# Patient Record
Sex: FEMALE | Race: WHITE | Employment: OTHER | URBAN - METROPOLITAN AREA
[De-identification: names, ages, dates, MRNs, and addresses within clinical notes are randomized per-mention and may not be internally consistent; named-entity substitution may affect disease eponyms.]

---

## 2018-05-08 ENCOUNTER — OFFICE VISIT (OUTPATIENT)
Dept: FAMILY MEDICINE CLINIC | Age: 78
End: 2018-05-08
Payer: MEDICARE

## 2018-05-08 VITALS
TEMPERATURE: 98.1 F | WEIGHT: 210 LBS | DIASTOLIC BLOOD PRESSURE: 78 MMHG | BODY MASS INDEX: 39.65 KG/M2 | HEIGHT: 61 IN | OXYGEN SATURATION: 92 % | HEART RATE: 102 BPM | SYSTOLIC BLOOD PRESSURE: 136 MMHG

## 2018-05-08 DIAGNOSIS — I10 ESSENTIAL HYPERTENSION: Primary | ICD-10-CM

## 2018-05-08 DIAGNOSIS — E55.9 VITAMIN D DEFICIENCY: ICD-10-CM

## 2018-05-08 DIAGNOSIS — E78.2 MIXED HYPERLIPIDEMIA: ICD-10-CM

## 2018-05-08 DIAGNOSIS — Z12.31 SCREENING MAMMOGRAM, ENCOUNTER FOR: ICD-10-CM

## 2018-05-08 PROCEDURE — 99203 OFFICE O/P NEW LOW 30 MIN: CPT | Performed by: FAMILY MEDICINE

## 2018-05-08 PROCEDURE — G8417 CALC BMI ABV UP PARAM F/U: HCPCS | Performed by: FAMILY MEDICINE

## 2018-05-08 PROCEDURE — 1090F PRES/ABSN URINE INCON ASSESS: CPT | Performed by: FAMILY MEDICINE

## 2018-05-08 PROCEDURE — G8427 DOCREV CUR MEDS BY ELIG CLIN: HCPCS | Performed by: FAMILY MEDICINE

## 2018-05-08 PROCEDURE — 1123F ACP DISCUSS/DSCN MKR DOCD: CPT | Performed by: FAMILY MEDICINE

## 2018-05-08 PROCEDURE — 1036F TOBACCO NON-USER: CPT | Performed by: FAMILY MEDICINE

## 2018-05-08 PROCEDURE — 4040F PNEUMOC VAC/ADMIN/RCVD: CPT | Performed by: FAMILY MEDICINE

## 2018-05-08 PROCEDURE — G8400 PT W/DXA NO RESULTS DOC: HCPCS | Performed by: FAMILY MEDICINE

## 2018-05-08 RX ORDER — POLYETHYLENE GLYCOL 3350
POWDER (GRAM) MISCELLANEOUS
COMMUNITY
End: 2019-10-03

## 2018-05-08 RX ORDER — CYANOCOBALAMIN (VITAMIN B-12) 500 MCG
TABLET ORAL
COMMUNITY
End: 2018-09-06

## 2018-05-08 RX ORDER — BETA-CAROTENE 7500 MCG
25000 CAPSULE ORAL DAILY
COMMUNITY
End: 2018-09-06

## 2018-05-08 ASSESSMENT — PATIENT HEALTH QUESTIONNAIRE - PHQ9
1. LITTLE INTEREST OR PLEASURE IN DOING THINGS: 0
SUM OF ALL RESPONSES TO PHQ QUESTIONS 1-9: 0
SUM OF ALL RESPONSES TO PHQ9 QUESTIONS 1 & 2: 0
2. FEELING DOWN, DEPRESSED OR HOPELESS: 0

## 2018-06-20 ENCOUNTER — HOSPITAL ENCOUNTER (OUTPATIENT)
Dept: MAMMOGRAPHY | Age: 78
Discharge: HOME | End: 2018-06-22
Payer: MEDICARE

## 2018-06-20 DIAGNOSIS — Z12.31 SCREENING MAMMOGRAM, ENCOUNTER FOR: ICD-10-CM

## 2018-06-20 PROCEDURE — 77067 SCR MAMMO BI INCL CAD: CPT

## 2018-08-31 ENCOUNTER — HOSPITAL ENCOUNTER (OUTPATIENT)
Age: 78
Discharge: HOME | End: 2018-09-02
Payer: MEDICARE

## 2018-08-31 ENCOUNTER — NURSE ONLY (OUTPATIENT)
Dept: FAMILY MEDICINE CLINIC | Age: 78
End: 2018-08-31
Payer: MEDICARE

## 2018-08-31 DIAGNOSIS — I10 ESSENTIAL HYPERTENSION: ICD-10-CM

## 2018-08-31 DIAGNOSIS — E55.9 VITAMIN D DEFICIENCY: ICD-10-CM

## 2018-08-31 DIAGNOSIS — E78.2 MIXED HYPERLIPIDEMIA: ICD-10-CM

## 2018-08-31 DIAGNOSIS — E78.2 MIXED HYPERLIPIDEMIA: Primary | ICD-10-CM

## 2018-08-31 LAB
ALBUMIN SERPL-MCNC: 3.9 G/DL (ref 3.5–5.2)
ALP BLD-CCNC: 72 U/L (ref 35–104)
ALT SERPL-CCNC: 10 U/L (ref 0–32)
ANION GAP SERPL CALCULATED.3IONS-SCNC: 13 MMOL/L (ref 7–16)
AST SERPL-CCNC: 14 U/L (ref 0–31)
BILIRUB SERPL-MCNC: 0.6 MG/DL (ref 0–1.2)
BILIRUBIN DIRECT: <0.2 MG/DL (ref 0–0.3)
BILIRUBIN, INDIRECT: NORMAL MG/DL (ref 0–1)
BUN BLDV-MCNC: 17 MG/DL (ref 8–23)
CALCIUM SERPL-MCNC: 9.5 MG/DL (ref 8.6–10.2)
CHLORIDE BLD-SCNC: 98 MMOL/L (ref 98–107)
CHOLESTEROL, TOTAL: 151 MG/DL (ref 0–199)
CO2: 28 MMOL/L (ref 22–29)
CREAT SERPL-MCNC: 0.8 MG/DL (ref 0.5–1)
GFR AFRICAN AMERICAN: >60
GFR NON-AFRICAN AMERICAN: >60 ML/MIN/1.73
GLUCOSE BLD-MCNC: 81 MG/DL (ref 74–109)
HDLC SERPL-MCNC: 40 MG/DL
LDL CHOLESTEROL CALCULATED: 96 MG/DL (ref 0–99)
POTASSIUM SERPL-SCNC: 5.6 MMOL/L (ref 3.5–5)
SODIUM BLD-SCNC: 139 MMOL/L (ref 132–146)
TOTAL PROTEIN: 7.4 G/DL (ref 6.4–8.3)
TRIGL SERPL-MCNC: 74 MG/DL (ref 0–149)
VITAMIN D 25-HYDROXY: 46 NG/ML (ref 30–100)
VLDLC SERPL CALC-MCNC: 15 MG/DL

## 2018-08-31 PROCEDURE — 80076 HEPATIC FUNCTION PANEL: CPT

## 2018-08-31 PROCEDURE — 36415 COLL VENOUS BLD VENIPUNCTURE: CPT | Performed by: FAMILY MEDICINE

## 2018-08-31 PROCEDURE — 82306 VITAMIN D 25 HYDROXY: CPT

## 2018-08-31 PROCEDURE — 80061 LIPID PANEL: CPT

## 2018-08-31 PROCEDURE — 80048 BASIC METABOLIC PNL TOTAL CA: CPT

## 2018-09-02 DIAGNOSIS — E87.5 HYPERKALEMIA: Primary | ICD-10-CM

## 2018-09-06 ENCOUNTER — OFFICE VISIT (OUTPATIENT)
Dept: FAMILY MEDICINE CLINIC | Age: 78
End: 2018-09-06
Payer: MEDICARE

## 2018-09-06 VITALS
BODY MASS INDEX: 38.14 KG/M2 | HEART RATE: 102 BPM | WEIGHT: 202 LBS | OXYGEN SATURATION: 96 % | SYSTOLIC BLOOD PRESSURE: 130 MMHG | DIASTOLIC BLOOD PRESSURE: 84 MMHG | HEIGHT: 61 IN | TEMPERATURE: 97.7 F

## 2018-09-06 DIAGNOSIS — I10 ESSENTIAL HYPERTENSION: Primary | ICD-10-CM

## 2018-09-06 DIAGNOSIS — E78.2 MIXED HYPERLIPIDEMIA: ICD-10-CM

## 2018-09-06 DIAGNOSIS — D22.9 NEVUS: ICD-10-CM

## 2018-09-06 PROCEDURE — 1090F PRES/ABSN URINE INCON ASSESS: CPT | Performed by: FAMILY MEDICINE

## 2018-09-06 PROCEDURE — 1101F PT FALLS ASSESS-DOCD LE1/YR: CPT | Performed by: FAMILY MEDICINE

## 2018-09-06 PROCEDURE — G8427 DOCREV CUR MEDS BY ELIG CLIN: HCPCS | Performed by: FAMILY MEDICINE

## 2018-09-06 PROCEDURE — 1036F TOBACCO NON-USER: CPT | Performed by: FAMILY MEDICINE

## 2018-09-06 PROCEDURE — 4040F PNEUMOC VAC/ADMIN/RCVD: CPT | Performed by: FAMILY MEDICINE

## 2018-09-06 PROCEDURE — G8417 CALC BMI ABV UP PARAM F/U: HCPCS | Performed by: FAMILY MEDICINE

## 2018-09-06 PROCEDURE — G8400 PT W/DXA NO RESULTS DOC: HCPCS | Performed by: FAMILY MEDICINE

## 2018-09-06 PROCEDURE — 99213 OFFICE O/P EST LOW 20 MIN: CPT | Performed by: FAMILY MEDICINE

## 2018-09-06 PROCEDURE — 1123F ACP DISCUSS/DSCN MKR DOCD: CPT | Performed by: FAMILY MEDICINE

## 2018-09-06 RX ORDER — ROSUVASTATIN CALCIUM 5 MG/1
5 TABLET, COATED ORAL DAILY
Qty: 90 TABLET | Refills: 3 | Status: SHIPPED | OUTPATIENT
Start: 2018-09-06 | End: 2019-10-03 | Stop reason: SDUPTHER

## 2018-09-06 RX ORDER — CARVEDILOL 3.12 MG/1
3.12 TABLET ORAL 2 TIMES DAILY WITH MEALS
Qty: 180 TABLET | Refills: 3 | Status: SHIPPED | OUTPATIENT
Start: 2018-09-06 | End: 2019-10-03 | Stop reason: SDUPTHER

## 2018-09-06 NOTE — PROGRESS NOTES
History and Physical    Patient:   69 y.o. female MRN: [id number]     Date of Service: 9/6/2018      Chief complaint:    History of Present Illness   The patient is a 69 y.o. female    Chief Complaint   Patient presents with    Hypertension      CC: htn, hyperlipidemia,   has a nevus she would like removed    HPI:  HTN-blood pressure has been controlled   Hyperlipidemia- on rosuvastatin   Has been missing some of her med doses-she has been caring for her daughter who is ill from renal cancer. Past Medical History:      Diagnosis Date    Hyperlipidemia     Hypertension     On aspirin at home     pt has history of htn       Past Surgical History:        Procedure Laterality Date    BREAST SURGERY Right     lumpectomy    CHOLECYSTECTOMY      COLONOSCOPY  02/20/2017    TUBAL LIGATION         Allergies:  Patient has no known allergies. Social History:   TOBACCO:   reports that she has never smoked. She has never used smokeless tobacco.  ETOH:   reports that she does not drink alcohol.       Family History:   Family History   Problem Relation Age of Onset    Heart Attack Father        REVIEW OF SYSTEMS:     Review of Systems - General ROS: negative for - chills or fever  Respiratory ROS: no  shortness of breath, or wheezing  Chronic cough for 20 years   Cardiovascular ROS: no chest pain or dyspnea on exertion  Gastrointestinal ROS: no abdominal pain, change in bowel habits, or black or bloody stools  Genito-Urinary ROS: no dysuria, trouble voiding, or hematuria  Dermatological ROS: + moles right neck and left abdomen       Physical Exam   /84 (Site: Left Arm, Position: Sitting, Cuff Size: Large Adult)   Pulse 102   Temp 97.7 °F (36.5 °C) (Oral)   Ht 5' 1\" (1.549 m)   Wt 202 lb (91.6 kg)   SpO2 96%   BMI 38.17 kg/m²   Physical Examination: General appearance - alert, well appearing, and in no distress  Ears - bilateral TM's and external ear canals were not injected  Nose - no drainage, no

## 2018-09-07 ENCOUNTER — HOSPITAL ENCOUNTER (OUTPATIENT)
Age: 78
Discharge: HOME | End: 2018-09-07
Payer: MEDICARE

## 2018-09-07 DIAGNOSIS — E87.5 HYPERKALEMIA: ICD-10-CM

## 2018-09-07 LAB
ANION GAP SERPL CALCULATED.3IONS-SCNC: 8 MMOL/L (ref 7–16)
BUN BLDV-MCNC: 16 MG/DL (ref 8–23)
CALCIUM SERPL-MCNC: 9.5 MG/DL (ref 8.6–10.2)
CHLORIDE BLD-SCNC: 101 MMOL/L (ref 98–107)
CO2: 32 MMOL/L (ref 22–29)
CREAT SERPL-MCNC: 0.8 MG/DL (ref 0.5–1)
GFR AFRICAN AMERICAN: >60
GFR NON-AFRICAN AMERICAN: >60 ML/MIN/1.73
GLUCOSE BLD-MCNC: 94 MG/DL (ref 74–109)
POTASSIUM SERPL-SCNC: 5.2 MMOL/L (ref 3.5–5)
SODIUM BLD-SCNC: 141 MMOL/L (ref 132–146)

## 2018-09-07 PROCEDURE — 36415 COLL VENOUS BLD VENIPUNCTURE: CPT

## 2018-09-07 PROCEDURE — 80048 BASIC METABOLIC PNL TOTAL CA: CPT

## 2018-09-14 ENCOUNTER — TELEPHONE (OUTPATIENT)
Dept: FAMILY MEDICINE CLINIC | Age: 78
End: 2018-09-14

## 2018-09-14 NOTE — TELEPHONE ENCOUNTER
Patient has corn in between toes on left baby toe, that is hurting her and causing discomfort. Would like a referral to a foot doctor. Please advise.

## 2019-02-26 ENCOUNTER — TELEPHONE (OUTPATIENT)
Dept: FAMILY MEDICINE CLINIC | Age: 79
End: 2019-02-26

## 2019-02-26 DIAGNOSIS — E78.2 MIXED HYPERLIPIDEMIA: ICD-10-CM

## 2019-02-26 DIAGNOSIS — I10 ESSENTIAL HYPERTENSION: Primary | ICD-10-CM

## 2019-02-27 ENCOUNTER — NURSE ONLY (OUTPATIENT)
Dept: FAMILY MEDICINE CLINIC | Age: 79
End: 2019-02-27
Payer: MEDICARE

## 2019-02-27 ENCOUNTER — HOSPITAL ENCOUNTER (OUTPATIENT)
Age: 79
Discharge: HOME | End: 2019-03-01
Payer: MEDICARE

## 2019-02-27 DIAGNOSIS — E78.2 MIXED HYPERLIPIDEMIA: ICD-10-CM

## 2019-02-27 DIAGNOSIS — I10 ESSENTIAL HYPERTENSION: ICD-10-CM

## 2019-02-27 DIAGNOSIS — I10 ESSENTIAL HYPERTENSION: Primary | ICD-10-CM

## 2019-02-27 LAB
ALBUMIN SERPL-MCNC: 3.9 G/DL (ref 3.5–5.2)
ALP BLD-CCNC: 64 U/L (ref 35–104)
ALT SERPL-CCNC: 11 U/L (ref 0–32)
ANION GAP SERPL CALCULATED.3IONS-SCNC: 13 MMOL/L (ref 7–16)
AST SERPL-CCNC: 18 U/L (ref 0–31)
BILIRUB SERPL-MCNC: 0.4 MG/DL (ref 0–1.2)
BILIRUBIN DIRECT: <0.2 MG/DL (ref 0–0.3)
BILIRUBIN, INDIRECT: NORMAL MG/DL (ref 0–1)
BUN BLDV-MCNC: 12 MG/DL (ref 8–23)
CALCIUM SERPL-MCNC: 9.5 MG/DL (ref 8.6–10.2)
CHLORIDE BLD-SCNC: 101 MMOL/L (ref 98–107)
CHOLESTEROL, TOTAL: 158 MG/DL (ref 0–199)
CO2: 29 MMOL/L (ref 22–29)
CREAT SERPL-MCNC: 0.8 MG/DL (ref 0.5–1)
GFR AFRICAN AMERICAN: >60
GFR NON-AFRICAN AMERICAN: >60 ML/MIN/1.73
GLUCOSE BLD-MCNC: 87 MG/DL (ref 74–99)
HDLC SERPL-MCNC: 39 MG/DL
LDL CHOLESTEROL CALCULATED: 92 MG/DL (ref 0–99)
POTASSIUM SERPL-SCNC: 5.6 MMOL/L (ref 3.5–5)
SODIUM BLD-SCNC: 143 MMOL/L (ref 132–146)
TOTAL PROTEIN: 7 G/DL (ref 6.4–8.3)
TRIGL SERPL-MCNC: 134 MG/DL (ref 0–149)
VLDLC SERPL CALC-MCNC: 27 MG/DL

## 2019-02-27 PROCEDURE — 80061 LIPID PANEL: CPT

## 2019-02-27 PROCEDURE — 80048 BASIC METABOLIC PNL TOTAL CA: CPT

## 2019-02-27 PROCEDURE — 80076 HEPATIC FUNCTION PANEL: CPT

## 2019-02-27 PROCEDURE — 36415 COLL VENOUS BLD VENIPUNCTURE: CPT | Performed by: FAMILY MEDICINE

## 2019-02-28 DIAGNOSIS — E87.5 HYPERKALEMIA: Primary | ICD-10-CM

## 2019-03-04 ENCOUNTER — HOSPITAL ENCOUNTER (OUTPATIENT)
Age: 79
Discharge: HOME | End: 2019-03-04
Payer: MEDICARE

## 2019-03-04 DIAGNOSIS — E87.5 HYPERKALEMIA: ICD-10-CM

## 2019-03-04 LAB — POTASSIUM SERPL-SCNC: 4.5 MMOL/L (ref 3.5–5)

## 2019-03-04 PROCEDURE — 36415 COLL VENOUS BLD VENIPUNCTURE: CPT

## 2019-03-04 PROCEDURE — 84132 ASSAY OF SERUM POTASSIUM: CPT

## 2019-03-06 ENCOUNTER — OFFICE VISIT (OUTPATIENT)
Dept: FAMILY MEDICINE CLINIC | Age: 79
End: 2019-03-06
Payer: MEDICARE

## 2019-03-06 VITALS
BODY MASS INDEX: 39.65 KG/M2 | OXYGEN SATURATION: 92 % | WEIGHT: 210 LBS | DIASTOLIC BLOOD PRESSURE: 70 MMHG | HEART RATE: 89 BPM | TEMPERATURE: 98.1 F | SYSTOLIC BLOOD PRESSURE: 120 MMHG | HEIGHT: 61 IN

## 2019-03-06 DIAGNOSIS — E78.2 MIXED HYPERLIPIDEMIA: ICD-10-CM

## 2019-03-06 DIAGNOSIS — I10 ESSENTIAL HYPERTENSION: Primary | ICD-10-CM

## 2019-03-06 PROCEDURE — 1101F PT FALLS ASSESS-DOCD LE1/YR: CPT | Performed by: FAMILY MEDICINE

## 2019-03-06 PROCEDURE — 99212 OFFICE O/P EST SF 10 MIN: CPT | Performed by: FAMILY MEDICINE

## 2019-03-06 PROCEDURE — G8417 CALC BMI ABV UP PARAM F/U: HCPCS | Performed by: FAMILY MEDICINE

## 2019-03-06 PROCEDURE — G8427 DOCREV CUR MEDS BY ELIG CLIN: HCPCS | Performed by: FAMILY MEDICINE

## 2019-03-06 PROCEDURE — 1090F PRES/ABSN URINE INCON ASSESS: CPT | Performed by: FAMILY MEDICINE

## 2019-03-06 PROCEDURE — 1036F TOBACCO NON-USER: CPT | Performed by: FAMILY MEDICINE

## 2019-03-06 PROCEDURE — 1123F ACP DISCUSS/DSCN MKR DOCD: CPT | Performed by: FAMILY MEDICINE

## 2019-03-06 PROCEDURE — G8484 FLU IMMUNIZE NO ADMIN: HCPCS | Performed by: FAMILY MEDICINE

## 2019-03-06 PROCEDURE — G8400 PT W/DXA NO RESULTS DOC: HCPCS | Performed by: FAMILY MEDICINE

## 2019-03-06 PROCEDURE — 4040F PNEUMOC VAC/ADMIN/RCVD: CPT | Performed by: FAMILY MEDICINE

## 2019-03-06 ASSESSMENT — PATIENT HEALTH QUESTIONNAIRE - PHQ9
2. FEELING DOWN, DEPRESSED OR HOPELESS: 0
1. LITTLE INTEREST OR PLEASURE IN DOING THINGS: 0
SUM OF ALL RESPONSES TO PHQ9 QUESTIONS 1 & 2: 0
SUM OF ALL RESPONSES TO PHQ QUESTIONS 1-9: 0
SUM OF ALL RESPONSES TO PHQ QUESTIONS 1-9: 0

## 2019-04-18 ENCOUNTER — HOSPITAL ENCOUNTER (EMERGENCY)
Age: 79
Discharge: HOME | End: 2019-04-18
Attending: EMERGENCY MEDICINE
Payer: MEDICARE

## 2019-04-18 VITALS
HEART RATE: 87 BPM | BODY MASS INDEX: 39.65 KG/M2 | RESPIRATION RATE: 95 BRPM | HEIGHT: 61 IN | SYSTOLIC BLOOD PRESSURE: 144 MMHG | DIASTOLIC BLOOD PRESSURE: 88 MMHG | TEMPERATURE: 97.7 F | OXYGEN SATURATION: 91 % | WEIGHT: 210 LBS

## 2019-04-18 DIAGNOSIS — R04.0 EPISTAXIS: Primary | ICD-10-CM

## 2019-04-18 PROCEDURE — 99282 EMERGENCY DEPT VISIT SF MDM: CPT

## 2019-04-18 PROCEDURE — 30901 CONTROL OF NOSEBLEED: CPT

## 2019-04-18 RX ORDER — CEFDINIR 300 MG/1
300 CAPSULE ORAL DAILY
Qty: 7 CAPSULE | Refills: 0 | Status: SHIPPED | OUTPATIENT
Start: 2019-04-18 | End: 2019-04-25

## 2019-04-19 NOTE — ED PROVIDER NOTES
Department of Emergency Medicine   ED  Provider Note  Admit Date/RoomTime: 4/18/2019 10:43 PM  ED Room: 21/21      History of Present Illness:  4/18/19, Time: 11:24 PM         Greg Carolina is a 67 y.o. female presenting to the ED for epistaxis, beginning a few days ago. The complaint has been intermittent, moderate in severity, and worsened by nothing. Pt reports that she has been experiencing intermittent bleeding from her left nare for the past few days and today she had \"a lot come down\". Denies any blood thinner but does take ASA. Denies any trauma or injury. Their is no bleeding now. Pt denies any loc, chest pain, sob, palpitations, fever, chills, nausea, vomiting, diarrhea, abdominal pain, neck pain, extremity pain, edema, HA, congestion, visual disturbances, dysuria, constipation, hematuria, weakness, numbness, tingling, dizziness or any other further symptoms at this time. Review of Systems:   Pertinent positives and negatives are stated within HPI, all other systems reviewed and are negative.    --------------------------------------------- PAST HISTORY ------------------------------------------  Past Medical History:  has a past medical history of Hyperlipidemia, Hypertension, and On aspirin at home. Past Surgical History:  has a past surgical history that includes Cholecystectomy; Breast surgery (Right); Tubal ligation; and Colonoscopy (02/20/2017). Social History:  reports that she has never smoked. She has never used smokeless tobacco. She reports that she does not drink alcohol or use drugs. Family History: family history includes Heart Attack in her father. The patients home medications have been reviewed. Allergies: Patient has no known allergies.     ----------------------------------------------PHYSICAL EXAM--------------------------------------  Constitutional/General: Alert and oriented x3, well appearing, non toxic in NAD  Head: Normocephalic and atraumatic  Eyes: PERRL, EOMI, conjunctiva normal, sclera non icteric  Mouth: Oropharynx clear, handling secretions, no trismus   Nose: Dry blood noted to left nare   Respiratory: Lungs clear to auscultation bilaterally, no wheezes, rales, or rhonchi. Not in respiratory distress  Cardiovascular:  Regular rate. Regular rhythm. No murmurs, gallops, or rubs. 2+ distal pulses  GI:  Abdomen Soft, Non tender, Non distended. +BS. No rebound, guarding, or rigidity. Musculoskeletal: Moves all extremities x 4. Warm and well perfused, no clubbing, cyanosis, or edema. Integument: Skin warm   Neurologic: GCS 15,  Psychiatric: Normal Affect    -------------------------------------------------- RESULTS -------------------------------------------------  I have personally reviewed all laboratory and imaging results for this patient. Results are listed below. LABS:  No results found for this visit on 04/18/19. RADIOLOGY:  Interpreted by Radiologist.  No orders to display       ------------------------- NURSING NOTES AND VITALS REVIEWED ---------------------------   The nursing notes within the ED encounter and vital signs as below have been reviewed by myself. BP (!) 144/88   Pulse 109   Temp 97.7 °F (36.5 °C) (Oral)   Resp 16   Ht 5' 1\" (1.549 m)   Wt 210 lb (95.3 kg)   SpO2 91%   BMI 39.68 kg/m²   Oxygen Saturation Interpretation: Normal    The patients available past medical records and past encounters were reviewed. ------------------------- ED COURSE/MEDICAL DECISION MAKING----------------------  Medications - No data to display    Procedures:   PROCEDURE NOTE  4/18/19       Time: 11:20 PM    NASAL PACKING / CAUTERY  Risks, benefits and alternatives (for applicable procedures below) described. Performed By: Valerie Lema MD    Indication:   Left anterior epistaxis. Informed consent obtained: by patient or legal gardian. Procedure:  Left nares tamponaded with an anterior rapid Rhino nasal pack . Ronni Garrett   Hemostatsis obtained. Patient tolerated the procedure well. ENT Consultation:  No.           Medical Decision Making:    Pt presents for epistaxis. Nasal packing done in the ED. Pt advised to follow up with ENT as an outpatient. Patient was explicitly instructed on specific signs and symptoms on which to return to the emergency room for. Patient was instructed to return to the ER for any new or worsening symptoms. Additional discharge instructions were given verbally. All questions were answered. Patient is comfortable and agreeable with discharge plan. Patient in no acute distress and non-toxic in appearance. This patient's ED course included: a personal history and physicial examination    This patient has remained hemodynamically stable during their ED course. Re-Evaluations:           Re-evaluation. Patients symptoms are improving           Counseling: The emergency provider has spoken with the patient and discussed todays results, in addition to providing specific details for the plan of care and counseling regarding the diagnosis and prognosis. Questions are answered at this time and they are agreeable with the plan.     ---------------------------- IMPRESSION AND DISPOSITION ---------------------------------    IMPRESSION  1. Epistaxis Stable       DISPOSITION  Disposition: Discharge to home  Patient condition is stable    SCRIBE ATTESTATION  4/18/19, 11:24 PM.    This note is prepared by [facility], acting as Scribe for Marjorie Luciano MD.    Marjorie Luciano MD: The scribe's documentation has been prepared under my direction and personally reviewed by me in its entirety. I confirm that the note above accurately reflects all work, treatment, procedures, and medical decision making performed by me. NOTE: This report was transcribed using voice recognition software. Every effort was made to ensure accuracy; however, inadvertent computerized transcription errors may be present.         Rayna Miller Yadiel Cast MD  04/19/19 [id number]

## 2019-06-14 ENCOUNTER — OFFICE VISIT (OUTPATIENT)
Dept: FAMILY MEDICINE CLINIC | Age: 79
End: 2019-06-14
Payer: MEDICARE

## 2019-06-14 VITALS
HEIGHT: 61 IN | SYSTOLIC BLOOD PRESSURE: 136 MMHG | DIASTOLIC BLOOD PRESSURE: 80 MMHG | HEART RATE: 110 BPM | TEMPERATURE: 98.3 F | WEIGHT: 207.5 LBS | BODY MASS INDEX: 39.18 KG/M2 | OXYGEN SATURATION: 93 %

## 2019-06-14 DIAGNOSIS — R05.9 COUGH: ICD-10-CM

## 2019-06-14 DIAGNOSIS — J18.9 PNEUMONIA DUE TO INFECTIOUS ORGANISM, UNSPECIFIED LATERALITY, UNSPECIFIED PART OF LUNG: ICD-10-CM

## 2019-06-14 DIAGNOSIS — R00.2 PALPITATION: Primary | ICD-10-CM

## 2019-06-14 PROCEDURE — 99213 OFFICE O/P EST LOW 20 MIN: CPT | Performed by: FAMILY MEDICINE

## 2019-06-14 PROCEDURE — 93000 ELECTROCARDIOGRAM COMPLETE: CPT | Performed by: FAMILY MEDICINE

## 2019-06-14 RX ORDER — CEFUROXIME AXETIL 500 MG/1
500 TABLET ORAL 2 TIMES DAILY
Qty: 20 TABLET | Refills: 0 | Status: SHIPPED | OUTPATIENT
Start: 2019-06-14 | End: 2019-06-24

## 2019-06-14 ASSESSMENT — ENCOUNTER SYMPTOMS: COUGH: 1

## 2019-06-24 ENCOUNTER — HOSPITAL ENCOUNTER (OUTPATIENT)
Age: 79
Discharge: HOME | End: 2019-06-26
Payer: MEDICARE

## 2019-06-24 ENCOUNTER — HOSPITAL ENCOUNTER (OUTPATIENT)
Dept: MAMMOGRAPHY | Age: 79
Discharge: HOME | End: 2019-06-26
Payer: MEDICARE

## 2019-06-24 ENCOUNTER — OFFICE VISIT (OUTPATIENT)
Dept: FAMILY MEDICINE CLINIC | Age: 79
End: 2019-06-24
Payer: MEDICARE

## 2019-06-24 VITALS
OXYGEN SATURATION: 95 % | DIASTOLIC BLOOD PRESSURE: 80 MMHG | BODY MASS INDEX: 39.11 KG/M2 | HEART RATE: 78 BPM | SYSTOLIC BLOOD PRESSURE: 126 MMHG | RESPIRATION RATE: 18 BRPM | TEMPERATURE: 98.2 F | WEIGHT: 207 LBS

## 2019-06-24 DIAGNOSIS — R53.83 FATIGUE, UNSPECIFIED TYPE: ICD-10-CM

## 2019-06-24 DIAGNOSIS — R53.83 FATIGUE, UNSPECIFIED TYPE: Primary | ICD-10-CM

## 2019-06-24 DIAGNOSIS — Z12.39 BREAST CANCER SCREENING: ICD-10-CM

## 2019-06-24 LAB
ALBUMIN SERPL-MCNC: 4 G/DL (ref 3.5–5.2)
ALP BLD-CCNC: 71 U/L (ref 35–104)
ALT SERPL-CCNC: 11 U/L (ref 0–32)
ANION GAP SERPL CALCULATED.3IONS-SCNC: 13 MMOL/L (ref 7–16)
AST SERPL-CCNC: 15 U/L (ref 0–31)
BILIRUB SERPL-MCNC: 0.2 MG/DL (ref 0–1.2)
BILIRUBIN, POC: NORMAL
BLOOD URINE, POC: NORMAL
BUN BLDV-MCNC: 11 MG/DL (ref 8–23)
CALCIUM SERPL-MCNC: 9.7 MG/DL (ref 8.6–10.2)
CHLORIDE BLD-SCNC: 99 MMOL/L (ref 98–107)
CLARITY, POC: CLEAR
CO2: 27 MMOL/L (ref 22–29)
COLOR, POC: YELLOW
CREAT SERPL-MCNC: 0.6 MG/DL (ref 0.5–1)
GFR AFRICAN AMERICAN: >60
GFR NON-AFRICAN AMERICAN: >60 ML/MIN/1.73
GLUCOSE BLD-MCNC: 89 MG/DL (ref 74–99)
GLUCOSE URINE, POC: NORMAL
HCT VFR BLD CALC: 48.5 % (ref 34–48)
HEMOGLOBIN: 15.2 G/DL (ref 11.5–15.5)
KETONES, POC: NORMAL
LEUKOCYTE EST, POC: NORMAL
MAGNESIUM: 2.3 MG/DL (ref 1.6–2.6)
MCH RBC QN AUTO: 31 PG (ref 26–35)
MCHC RBC AUTO-ENTMCNC: 31.3 % (ref 32–34.5)
MCV RBC AUTO: 99 FL (ref 80–99.9)
NITRITE, POC: NORMAL
PDW BLD-RTO: 12.8 FL (ref 11.5–15)
PH, POC: 5.5
PLATELET # BLD: 306 E9/L (ref 130–450)
PMV BLD AUTO: 11 FL (ref 7–12)
POTASSIUM SERPL-SCNC: 5 MMOL/L (ref 3.5–5)
PROTEIN, POC: NORMAL
RBC # BLD: 4.9 E12/L (ref 3.5–5.5)
SODIUM BLD-SCNC: 139 MMOL/L (ref 132–146)
SPECIFIC GRAVITY, POC: 1.25
TOTAL PROTEIN: 7.2 G/DL (ref 6.4–8.3)
TSH SERPL DL<=0.05 MIU/L-ACNC: 1.81 UIU/ML (ref 0.27–4.2)
UROBILINOGEN, POC: 0.2
WBC # BLD: 12 E9/L (ref 4.5–11.5)

## 2019-06-24 PROCEDURE — 80053 COMPREHEN METABOLIC PANEL: CPT

## 2019-06-24 PROCEDURE — 77063 BREAST TOMOSYNTHESIS BI: CPT

## 2019-06-24 PROCEDURE — 84443 ASSAY THYROID STIM HORMONE: CPT

## 2019-06-24 PROCEDURE — 99214 OFFICE O/P EST MOD 30 MIN: CPT | Performed by: PHYSICIAN ASSISTANT

## 2019-06-24 PROCEDURE — 83735 ASSAY OF MAGNESIUM: CPT

## 2019-06-24 PROCEDURE — 85027 COMPLETE CBC AUTOMATED: CPT

## 2019-06-24 PROCEDURE — 36415 COLL VENOUS BLD VENIPUNCTURE: CPT

## 2019-06-24 PROCEDURE — 81002 URINALYSIS NONAUTO W/O SCOPE: CPT | Performed by: PHYSICIAN ASSISTANT

## 2019-06-24 NOTE — PROGRESS NOTES
COLONOSCOPY  02/20/2017    TUBAL LIGATION       Medications:     Current Outpatient Medications:     cefUROXime (CEFTIN) 500 MG tablet, Take 1 tablet by mouth 2 times daily for 10 days, Disp: 20 tablet, Rfl: 0    rosuvastatin (CRESTOR) 5 MG tablet, Take 1 tablet by mouth daily, Disp: 90 tablet, Rfl: 3    carvedilol (COREG) 3.125 MG tablet, Take 1 tablet by mouth 2 times daily (with meals) Instructed to take am of procedure, Disp: 180 tablet, Rfl: 3    Polyethylene Glycol 3350 POWD, by Does not apply route Mix one capful in 8 ounces of noncarbonated liquid and drink once daily, Disp: , Rfl:     Allergies:   No Known Allergies    Social History:     Social History     Tobacco Use    Smoking status: Never Smoker    Smokeless tobacco: Never Used   Substance Use Topics    Alcohol use: No    Drug use: No       Physical Exam:     Vitals:    06/24/19 1338   BP: 126/80   Pulse: 78   Resp: 18   Temp: 98.2 °F (36.8 °C)   TempSrc: Temporal   SpO2: 95%   Weight: 207 lb (93.9 kg)       Exam:  Physical Exam  Nurse's notes and vital signs reviewed. The patient is not hypoxic. General: Alert, no acute distress, patient resting comfortably Patient is not toxic or lethargic. Skin: Warm, intact, no pallor noted. There is no evidence of rash at this time. Head: Normocephalic, atraumatic. Eye: Normal conjunctiva  Ears, Nose, Throat: Right tympanic membrane clear, left tympanic membrane clear. No drainage or discharge noted. No pre- or post-auricular tenderness, erythema, or swelling noted. No significant nasal congestion or rhinorrhea. No facial erythema. Posterior oropharynx shows no erythema and no hypertrophy, asymmetry or peritonsillar abscess and no evidence of exudate. the uvula is midline. No trismus or drooling is noted. Moist mucous membranes. Neck: No anterior/posterior lymphadenopathy noted. No erythema, no masses, no fluctuance or induration noted. No meningeal signs.   Cardio: Regular Rate and repeat evaluation repeat assessment. Clinical Impression:   Disha Saldana was seen today for fatigue. Diagnoses and all orders for this visit:    Fatigue, unspecified type  -     CBC; Future  -     COMPREHENSIVE METABOLIC PANEL; Future  -     MAGNESIUM; Future  -     TSH; Future  -     POCT Urinalysis no Micro        The patient is to call for any concerns or return if any of the signs or symptoms worsen. The patient is to follow-up with PCP in the next 2-3 days for repeat evaluation repeat assessment or go directly to the emergency department.      SIGNATURE: Lesvia Smith III, PA-C

## 2019-06-26 ASSESSMENT — ENCOUNTER SYMPTOMS
ABDOMINAL PAIN: 0
BLOOD IN STOOL: 0
TROUBLE SWALLOWING: 0
SHORTNESS OF BREATH: 0
CHEST TIGHTNESS: 0

## 2019-06-26 NOTE — PROGRESS NOTES
19  Media Pop : 1940 Sex: female  Age: 67 y.o. Chief Complaint   Patient presents with    Fatigue       Pt was in express on  with sx of fatigue for 2 weeks. Urine and chest xray was normal. Pt had labs completed which were also normal --having episodes of weakness. Was on ab's for her cough      Review of Systems   Constitutional: Negative for activity change, appetite change, fatigue and unexpected weight change. HENT: Negative for congestion, dental problem and trouble swallowing. Eyes: Negative for visual disturbance. Respiratory: Negative for chest tightness and shortness of breath. Cardiovascular: Negative for chest pain, palpitations and leg swelling. Gastrointestinal: Negative for abdominal pain and blood in stool. Endocrine: Negative for cold intolerance, heat intolerance, polydipsia and polyuria. Genitourinary: Negative for difficulty urinating, hematuria and menstrual problem. Musculoskeletal: Negative for arthralgias, gait problem, joint swelling and myalgias. Allergic/Immunologic: Negative for environmental allergies and food allergies. Neurological: Negative for syncope, weakness, light-headedness and numbness. Hematological: Negative for adenopathy. Psychiatric/Behavioral: The patient is not nervous/anxious.          NOT DEPRESSED         Current Outpatient Medications:     rosuvastatin (CRESTOR) 5 MG tablet, Take 1 tablet by mouth daily, Disp: 90 tablet, Rfl: 3    carvedilol (COREG) 3.125 MG tablet, Take 1 tablet by mouth 2 times daily (with meals) Instructed to take am of procedure, Disp: 180 tablet, Rfl: 3    Polyethylene Glycol 3350 POWD, by Does not apply route Mix one capful in 8 ounces of noncarbonated liquid and drink once daily, Disp: , Rfl:   No Known Allergies    Past Medical History:   Diagnosis Date    Hyperlipidemia     Hypertension     On aspirin at home     pt has history of htn     Past Surgical History:   Procedure Laterality Date    BREAST SURGERY Right     lumpectomy    CHOLECYSTECTOMY      COLONOSCOPY  02/20/2017    TUBAL LIGATION       Family History   Problem Relation Age of Onset    Heart Attack Father      Social History     Socioeconomic History    Marital status:      Spouse name: Not on file    Number of children: Not on file    Years of education: Not on file    Highest education level: Not on file   Occupational History    Not on file   Social Needs    Financial resource strain: Not on file    Food insecurity:     Worry: Not on file     Inability: Not on file    Transportation needs:     Medical: Not on file     Non-medical: Not on file   Tobacco Use    Smoking status: Never Smoker    Smokeless tobacco: Never Used   Substance and Sexual Activity    Alcohol use: No    Drug use: No    Sexual activity: Not on file   Lifestyle    Physical activity:     Days per week: Not on file     Minutes per session: Not on file    Stress: Not on file   Relationships    Social connections:     Talks on phone: Not on file     Gets together: Not on file     Attends Congregational service: Not on file     Active member of club or organization: Not on file     Attends meetings of clubs or organizations: Not on file     Relationship status: Not on file    Intimate partner violence:     Fear of current or ex partner: Not on file     Emotionally abused: Not on file     Physically abused: Not on file     Forced sexual activity: Not on file   Other Topics Concern    Not on file   Social History Narrative    Not on file       Vitals:    06/27/19 1022   BP: 136/86   Pulse: 88   Resp: 16   Weight: 208 lb (94.3 kg)   Height: 5' 1\" (1.549 m)       Physical Exam   Constitutional: She is oriented to person, place, and time. She appears well-developed and well-nourished. Eyes: Pupils are equal, round, and reactive to light. EOM are normal.   Neck: Normal range of motion. Neck supple.    Cardiovascular: Normal rate, regular rhythm and normal heart sounds. Pulmonary/Chest: Effort normal and breath sounds normal.   Abdominal: Soft. Musculoskeletal: Normal range of motion. Neurological: She is alert and oriented to person, place, and time. Skin: Skin is warm and dry. Assessment and Plan:  Son Nolasco was seen today for fatigue. Diagnoses and all orders for this visit:    Other fatigue  Comments:  bw normal --exam normal    Cardiac arrhythmia, unspecified cardiac arrhythmia type  Comments:  ekg and holter  Orders:  -     EKG 12 lead; Future  -     Holter Monitor 24 Hour; Future        Return for after holter. Seen By:  Heri Kate, DO  This note has been transcribed by Josh Bills under the direction of Dr. Karol Iqbal. Dr. Kedar Yepez has reviewed this note for accuracy. I, Dr. Thacker Read, personally performed the services described in this documentation as scribed by Josh Bills in my presence, and it is both accurate and complete.

## 2019-06-27 ENCOUNTER — OFFICE VISIT (OUTPATIENT)
Dept: FAMILY MEDICINE CLINIC | Age: 79
End: 2019-06-27
Payer: MEDICARE

## 2019-06-27 VITALS
HEIGHT: 61 IN | RESPIRATION RATE: 16 BRPM | BODY MASS INDEX: 39.27 KG/M2 | WEIGHT: 208 LBS | DIASTOLIC BLOOD PRESSURE: 86 MMHG | HEART RATE: 88 BPM | SYSTOLIC BLOOD PRESSURE: 136 MMHG

## 2019-06-27 DIAGNOSIS — I49.9 CARDIAC ARRHYTHMIA, UNSPECIFIED CARDIAC ARRHYTHMIA TYPE: ICD-10-CM

## 2019-06-27 DIAGNOSIS — R53.83 OTHER FATIGUE: Primary | ICD-10-CM

## 2019-06-27 PROCEDURE — 93000 ELECTROCARDIOGRAM COMPLETE: CPT | Performed by: FAMILY MEDICINE

## 2019-06-27 PROCEDURE — 99214 OFFICE O/P EST MOD 30 MIN: CPT | Performed by: FAMILY MEDICINE

## 2019-10-02 ASSESSMENT — ENCOUNTER SYMPTOMS
CHEST TIGHTNESS: 0
SHORTNESS OF BREATH: 0
BLOOD IN STOOL: 0
ABDOMINAL PAIN: 0
TROUBLE SWALLOWING: 0

## 2019-10-03 ENCOUNTER — OFFICE VISIT (OUTPATIENT)
Dept: FAMILY MEDICINE CLINIC | Age: 79
End: 2019-10-03
Payer: MEDICARE

## 2019-10-03 VITALS
WEIGHT: 207.8 LBS | SYSTOLIC BLOOD PRESSURE: 126 MMHG | BODY MASS INDEX: 38.24 KG/M2 | DIASTOLIC BLOOD PRESSURE: 80 MMHG | HEIGHT: 62 IN | HEART RATE: 74 BPM

## 2019-10-03 DIAGNOSIS — I10 ESSENTIAL HYPERTENSION: ICD-10-CM

## 2019-10-03 DIAGNOSIS — E78.2 MIXED HYPERLIPIDEMIA: Primary | ICD-10-CM

## 2019-10-03 DIAGNOSIS — M79.602 PAIN OF LEFT UPPER EXTREMITY: ICD-10-CM

## 2019-10-03 PROCEDURE — G8484 FLU IMMUNIZE NO ADMIN: HCPCS | Performed by: FAMILY MEDICINE

## 2019-10-03 PROCEDURE — G8400 PT W/DXA NO RESULTS DOC: HCPCS | Performed by: FAMILY MEDICINE

## 2019-10-03 PROCEDURE — G8427 DOCREV CUR MEDS BY ELIG CLIN: HCPCS | Performed by: FAMILY MEDICINE

## 2019-10-03 PROCEDURE — G8417 CALC BMI ABV UP PARAM F/U: HCPCS | Performed by: FAMILY MEDICINE

## 2019-10-03 PROCEDURE — 99213 OFFICE O/P EST LOW 20 MIN: CPT | Performed by: FAMILY MEDICINE

## 2019-10-03 PROCEDURE — 4040F PNEUMOC VAC/ADMIN/RCVD: CPT | Performed by: FAMILY MEDICINE

## 2019-10-03 PROCEDURE — 1036F TOBACCO NON-USER: CPT | Performed by: FAMILY MEDICINE

## 2019-10-03 PROCEDURE — 1123F ACP DISCUSS/DSCN MKR DOCD: CPT | Performed by: FAMILY MEDICINE

## 2019-10-03 PROCEDURE — 1090F PRES/ABSN URINE INCON ASSESS: CPT | Performed by: FAMILY MEDICINE

## 2019-10-03 RX ORDER — ROSUVASTATIN CALCIUM 5 MG/1
5 TABLET, COATED ORAL DAILY
Qty: 90 TABLET | Refills: 3 | Status: SHIPPED | OUTPATIENT
Start: 2019-10-03 | End: 2019-12-30 | Stop reason: SDUPTHER

## 2019-10-03 RX ORDER — CARVEDILOL 3.12 MG/1
3.12 TABLET ORAL 2 TIMES DAILY WITH MEALS
Qty: 180 TABLET | Refills: 3 | Status: SHIPPED | OUTPATIENT
Start: 2019-10-03 | End: 2019-12-30 | Stop reason: SDUPTHER

## 2019-10-08 ENCOUNTER — TELEPHONE (OUTPATIENT)
Dept: FAMILY MEDICINE CLINIC | Age: 79
End: 2019-10-08

## 2019-12-26 ASSESSMENT — ENCOUNTER SYMPTOMS
SHORTNESS OF BREATH: 0
CHEST TIGHTNESS: 0
BLOOD IN STOOL: 0
ABDOMINAL PAIN: 0
TROUBLE SWALLOWING: 0

## 2019-12-30 ENCOUNTER — OFFICE VISIT (OUTPATIENT)
Dept: FAMILY MEDICINE CLINIC | Age: 79
End: 2019-12-30
Payer: MEDICARE

## 2019-12-30 ENCOUNTER — HOSPITAL ENCOUNTER (OUTPATIENT)
Age: 79
Discharge: HOME | End: 2020-01-01
Payer: MEDICARE

## 2019-12-30 VITALS
HEIGHT: 62 IN | DIASTOLIC BLOOD PRESSURE: 80 MMHG | TEMPERATURE: 97.8 F | WEIGHT: 209.6 LBS | HEART RATE: 101 BPM | SYSTOLIC BLOOD PRESSURE: 132 MMHG | RESPIRATION RATE: 18 BRPM | BODY MASS INDEX: 38.57 KG/M2 | OXYGEN SATURATION: 97 %

## 2019-12-30 DIAGNOSIS — I10 ESSENTIAL HYPERTENSION: Primary | ICD-10-CM

## 2019-12-30 DIAGNOSIS — E78.2 MIXED HYPERLIPIDEMIA: ICD-10-CM

## 2019-12-30 DIAGNOSIS — R53.83 OTHER FATIGUE: ICD-10-CM

## 2019-12-30 LAB
CHOLESTEROL, TOTAL: 201 MG/DL (ref 0–199)
HDLC SERPL-MCNC: 40 MG/DL
LDL CHOLESTEROL CALCULATED: 138 MG/DL (ref 0–99)
T4 FREE: 1.19 NG/DL (ref 0.93–1.7)
TRIGL SERPL-MCNC: 115 MG/DL (ref 0–149)
TSH SERPL DL<=0.05 MIU/L-ACNC: 2.34 UIU/ML (ref 0.27–4.2)
VLDLC SERPL CALC-MCNC: 23 MG/DL

## 2019-12-30 PROCEDURE — G8427 DOCREV CUR MEDS BY ELIG CLIN: HCPCS | Performed by: FAMILY MEDICINE

## 2019-12-30 PROCEDURE — G8484 FLU IMMUNIZE NO ADMIN: HCPCS | Performed by: FAMILY MEDICINE

## 2019-12-30 PROCEDURE — 99214 OFFICE O/P EST MOD 30 MIN: CPT | Performed by: FAMILY MEDICINE

## 2019-12-30 PROCEDURE — 84443 ASSAY THYROID STIM HORMONE: CPT

## 2019-12-30 PROCEDURE — 4040F PNEUMOC VAC/ADMIN/RCVD: CPT | Performed by: FAMILY MEDICINE

## 2019-12-30 PROCEDURE — 1123F ACP DISCUSS/DSCN MKR DOCD: CPT | Performed by: FAMILY MEDICINE

## 2019-12-30 PROCEDURE — 36415 COLL VENOUS BLD VENIPUNCTURE: CPT

## 2019-12-30 PROCEDURE — 1036F TOBACCO NON-USER: CPT | Performed by: FAMILY MEDICINE

## 2019-12-30 PROCEDURE — G8400 PT W/DXA NO RESULTS DOC: HCPCS | Performed by: FAMILY MEDICINE

## 2019-12-30 PROCEDURE — G8417 CALC BMI ABV UP PARAM F/U: HCPCS | Performed by: FAMILY MEDICINE

## 2019-12-30 PROCEDURE — 84439 ASSAY OF FREE THYROXINE: CPT

## 2019-12-30 PROCEDURE — 1090F PRES/ABSN URINE INCON ASSESS: CPT | Performed by: FAMILY MEDICINE

## 2019-12-30 PROCEDURE — 80061 LIPID PANEL: CPT

## 2019-12-30 RX ORDER — ROSUVASTATIN CALCIUM 5 MG/1
5 TABLET, COATED ORAL DAILY
Qty: 90 TABLET | Refills: 3 | Status: SHIPPED
Start: 2019-12-30 | End: 2020-06-25 | Stop reason: SDUPTHER

## 2019-12-30 RX ORDER — CARVEDILOL 3.12 MG/1
3.12 TABLET ORAL 2 TIMES DAILY WITH MEALS
Qty: 180 TABLET | Refills: 3 | Status: SHIPPED
Start: 2019-12-30 | End: 2020-06-25 | Stop reason: SDUPTHER

## 2019-12-31 ENCOUNTER — TELEPHONE (OUTPATIENT)
Dept: FAMILY MEDICINE CLINIC | Age: 79
End: 2019-12-31

## 2020-02-25 ENCOUNTER — TELEPHONE (OUTPATIENT)
Dept: FAMILY MEDICINE CLINIC | Age: 80
End: 2020-02-25

## 2020-02-25 ASSESSMENT — ENCOUNTER SYMPTOMS
TROUBLE SWALLOWING: 0
ABDOMINAL PAIN: 0
SHORTNESS OF BREATH: 0
BLOOD IN STOOL: 0
CHEST TIGHTNESS: 0

## 2020-02-25 NOTE — TELEPHONE ENCOUNTER
Patient would like to see Dr. Patricia Goldstein, she has had pain right back under shoulder blade for about 2 weeks, burning sensation. No openings for several weeks. She refused express. Please call her and advise.  Thanks

## 2020-02-26 ENCOUNTER — HOSPITAL ENCOUNTER (OUTPATIENT)
Age: 80
Discharge: HOME | End: 2020-02-28
Payer: MEDICARE

## 2020-02-26 ENCOUNTER — OFFICE VISIT (OUTPATIENT)
Dept: FAMILY MEDICINE CLINIC | Age: 80
End: 2020-02-26
Payer: MEDICARE

## 2020-02-26 VITALS
WEIGHT: 217.3 LBS | BODY MASS INDEX: 39.74 KG/M2 | DIASTOLIC BLOOD PRESSURE: 70 MMHG | RESPIRATION RATE: 18 BRPM | SYSTOLIC BLOOD PRESSURE: 134 MMHG | TEMPERATURE: 98 F | OXYGEN SATURATION: 93 % | HEART RATE: 74 BPM

## 2020-02-26 LAB
ALBUMIN SERPL-MCNC: 3.9 G/DL (ref 3.5–5.2)
ALP BLD-CCNC: 68 U/L (ref 35–104)
ALT SERPL-CCNC: 11 U/L (ref 0–32)
ANION GAP SERPL CALCULATED.3IONS-SCNC: 14 MMOL/L (ref 7–16)
AST SERPL-CCNC: 14 U/L (ref 0–31)
BASOPHILS ABSOLUTE: 0.03 E9/L (ref 0–0.2)
BASOPHILS RELATIVE PERCENT: 0.3 % (ref 0–2)
BILIRUB SERPL-MCNC: 0.4 MG/DL (ref 0–1.2)
BUN BLDV-MCNC: 15 MG/DL (ref 8–23)
CALCIUM SERPL-MCNC: 9.6 MG/DL (ref 8.6–10.2)
CHLORIDE BLD-SCNC: 101 MMOL/L (ref 98–107)
CO2: 26 MMOL/L (ref 22–29)
CREAT SERPL-MCNC: 0.7 MG/DL (ref 0.5–1)
EOSINOPHILS ABSOLUTE: 0.13 E9/L (ref 0.05–0.5)
EOSINOPHILS RELATIVE PERCENT: 1.2 % (ref 0–6)
GFR AFRICAN AMERICAN: >60
GFR NON-AFRICAN AMERICAN: >60 ML/MIN/1.73
GLUCOSE BLD-MCNC: 78 MG/DL (ref 74–99)
HCT VFR BLD CALC: 44.8 % (ref 34–48)
HEMOGLOBIN: 13.8 G/DL (ref 11.5–15.5)
IMMATURE GRANULOCYTES #: 0.03 E9/L
IMMATURE GRANULOCYTES %: 0.3 % (ref 0–5)
LYMPHOCYTES ABSOLUTE: 2.29 E9/L (ref 1.5–4)
LYMPHOCYTES RELATIVE PERCENT: 21.9 % (ref 20–42)
MCH RBC QN AUTO: 30.4 PG (ref 26–35)
MCHC RBC AUTO-ENTMCNC: 30.8 % (ref 32–34.5)
MCV RBC AUTO: 98.7 FL (ref 80–99.9)
MONOCYTES ABSOLUTE: 0.84 E9/L (ref 0.1–0.95)
MONOCYTES RELATIVE PERCENT: 8 % (ref 2–12)
NEUTROPHILS ABSOLUTE: 7.14 E9/L (ref 1.8–7.3)
NEUTROPHILS RELATIVE PERCENT: 68.3 % (ref 43–80)
PDW BLD-RTO: 12.8 FL (ref 11.5–15)
PLATELET # BLD: 263 E9/L (ref 130–450)
PMV BLD AUTO: 11.4 FL (ref 7–12)
POTASSIUM SERPL-SCNC: 4.2 MMOL/L (ref 3.5–5)
RBC # BLD: 4.54 E12/L (ref 3.5–5.5)
SODIUM BLD-SCNC: 141 MMOL/L (ref 132–146)
TOTAL PROTEIN: 7.2 G/DL (ref 6.4–8.3)
WBC # BLD: 10.5 E9/L (ref 4.5–11.5)

## 2020-02-26 PROCEDURE — 1036F TOBACCO NON-USER: CPT | Performed by: FAMILY MEDICINE

## 2020-02-26 PROCEDURE — 93000 ELECTROCARDIOGRAM COMPLETE: CPT | Performed by: FAMILY MEDICINE

## 2020-02-26 PROCEDURE — G8484 FLU IMMUNIZE NO ADMIN: HCPCS | Performed by: FAMILY MEDICINE

## 2020-02-26 PROCEDURE — 85025 COMPLETE CBC W/AUTO DIFF WBC: CPT

## 2020-02-26 PROCEDURE — G8400 PT W/DXA NO RESULTS DOC: HCPCS | Performed by: FAMILY MEDICINE

## 2020-02-26 PROCEDURE — 99214 OFFICE O/P EST MOD 30 MIN: CPT | Performed by: FAMILY MEDICINE

## 2020-02-26 PROCEDURE — 1090F PRES/ABSN URINE INCON ASSESS: CPT | Performed by: FAMILY MEDICINE

## 2020-02-26 PROCEDURE — 4040F PNEUMOC VAC/ADMIN/RCVD: CPT | Performed by: FAMILY MEDICINE

## 2020-02-26 PROCEDURE — G8417 CALC BMI ABV UP PARAM F/U: HCPCS | Performed by: FAMILY MEDICINE

## 2020-02-26 PROCEDURE — 80053 COMPREHEN METABOLIC PANEL: CPT

## 2020-02-26 PROCEDURE — 36415 COLL VENOUS BLD VENIPUNCTURE: CPT

## 2020-02-26 PROCEDURE — G8427 DOCREV CUR MEDS BY ELIG CLIN: HCPCS | Performed by: FAMILY MEDICINE

## 2020-02-26 PROCEDURE — 1123F ACP DISCUSS/DSCN MKR DOCD: CPT | Performed by: FAMILY MEDICINE

## 2020-02-26 ASSESSMENT — PATIENT HEALTH QUESTIONNAIRE - PHQ9
SUM OF ALL RESPONSES TO PHQ QUESTIONS 1-9: 0
SUM OF ALL RESPONSES TO PHQ QUESTIONS 1-9: 0
2. FEELING DOWN, DEPRESSED OR HOPELESS: 0
SUM OF ALL RESPONSES TO PHQ9 QUESTIONS 1 & 2: 0
1. LITTLE INTEREST OR PLEASURE IN DOING THINGS: 0

## 2020-02-26 ASSESSMENT — ENCOUNTER SYMPTOMS
BLOOD IN STOOL: 0
CHEST TIGHTNESS: 0
ABDOMINAL PAIN: 0
TROUBLE SWALLOWING: 0
SHORTNESS OF BREATH: 1

## 2020-02-26 NOTE — PROGRESS NOTES
2/26/2020  Chief Complaint   Patient presents with    Shoulder Pain     right sided \"burning\"     Fatigue       HPI:  Patient here for right shoulder pain and fatigue. Patient states this started about 2 weeks ago a burning in the shoulder blade and under her arm to upper breast area. No rash present. Patient states she has dyspnea with exertion for the past year     Denies chest pain, edema, fatigue, palpitations and syncope. Compliance reviewed. No medication side effects noted. Review of Systems   Constitutional: Negative for appetite change, fatigue and unexpected weight change. HENT: Negative for congestion and trouble swallowing. Eyes: Negative for visual disturbance. Respiratory: Positive for shortness of breath. Negative for chest tightness. Cardiovascular: Negative for chest pain and leg swelling. Gastrointestinal: Negative for abdominal pain and blood in stool. Endocrine: Negative for cold intolerance, heat intolerance and polyuria. Genitourinary: Negative for difficulty urinating, hematuria and menstrual problem. Musculoskeletal: Negative for arthralgias, gait problem, joint swelling and myalgias. Allergic/Immunologic: Negative for environmental allergies and food allergies. Neurological: Negative for syncope, weakness, light-headedness and numbness. Hematological: Negative for adenopathy. Psychiatric/Behavioral: Negative for suicidal ideas. The patient is not nervous/anxious. NOT DEPRESSED         Objective  Vitals:    02/26/20 1045   BP: 134/70   Pulse: 74   Resp: 18   Temp: 98 °F (36.7 °C)   TempSrc: Temporal   SpO2: 93%   Weight: 217 lb 4.8 oz (98.6 kg)        Physical Exam  Constitutional:       Appearance: Normal appearance. She is well-developed. HENT:      Head: Normocephalic. Nose: Nose normal.      Mouth/Throat:      Mouth: Mucous membranes are moist.   Neck:      Musculoskeletal: Normal range of motion and neck supple.       Thyroid: No thyromegaly. Cardiovascular:      Rate and Rhythm: Normal rate and regular rhythm. Heart sounds: Normal heart sounds. Pulmonary:      Effort: Pulmonary effort is normal.      Breath sounds: Normal breath sounds. Abdominal:      General: Bowel sounds are normal.      Palpations: Abdomen is soft. There is no mass. Tenderness: There is no abdominal tenderness. Musculoskeletal: Normal range of motion. Skin:     General: Skin is warm and dry. Findings: No rash. Neurological:      Mental Status: She is alert and oriented to person, place, and time. Cranial Nerves: No cranial nerve deficit. Sensory: No sensory deficit. Psychiatric:         Mood and Affect: Mood normal.         Behavior: Behavior normal.         Thought Content: Thought content normal.          Johnnie Monae was seen today for shoulder pain and fatigue. Diagnoses and all orders for this visit:    Acute pain of right shoulder    Other fatigue  -     CBC Auto Differential; Future  -     Comprehensive Metabolic Panel; Future    Chest pain, unspecified type  -     EKG 12 Lead; Future  -     XR CHEST STANDARD (2 VW); Future  -     EKG 12 Lead  -     ECHO Complete 2D W Doppler W Color; Future    Dyspnea on exertion  -     ECHO Complete 2D W Doppler W Color; Future        Return after echo. I have reviewed the chief complaint and history of present illness (including ROS and PFSH) and vital documentation by my staff and I agree with their documentation and have added where applicable.      Jesse Burgess, DO

## 2020-02-27 ENCOUNTER — TELEPHONE (OUTPATIENT)
Dept: FAMILY MEDICINE CLINIC | Age: 80
End: 2020-02-27

## 2020-02-27 NOTE — TELEPHONE ENCOUNTER
Next step is an echo--its ordered --[facility] should hear from [facility] by early next week for an appt--if not then call back

## 2020-03-02 ENCOUNTER — TELEPHONE (OUTPATIENT)
Dept: FAMILY MEDICINE CLINIC | Age: 80
End: 2020-03-02

## 2020-03-02 NOTE — TELEPHONE ENCOUNTER
Laurel Laura calling to tell you that she has heard nothing from [facility] regarding the Echo. She said that you wants to know if that was the case. Do you want me to set this up for her?

## 2020-03-13 ENCOUNTER — HOSPITAL ENCOUNTER (OUTPATIENT)
Dept: NON INVASIVE DIAGNOSTICS | Age: 80
Discharge: HOME | End: 2020-03-13
Payer: MEDICARE

## 2020-03-13 LAB
LV EF: 65 %
LVEF MODALITY: NORMAL

## 2020-03-13 PROCEDURE — 93306 TTE W/DOPPLER COMPLETE: CPT

## 2020-03-15 ENCOUNTER — TELEPHONE (OUTPATIENT)
Dept: PRIMARY CARE CLINIC | Age: 80
End: 2020-03-15

## 2020-03-15 RX ORDER — PREDNISONE 10 MG/1
TABLET ORAL
Qty: 21 TABLET | Refills: 0 | Status: SHIPPED
Start: 2020-03-15 | End: 2020-06-25

## 2020-03-25 ENCOUNTER — TELEPHONE (OUTPATIENT)
Dept: FAMILY MEDICINE CLINIC | Age: 80
End: 2020-03-25

## 2020-06-18 ENCOUNTER — HOSPITAL ENCOUNTER (OUTPATIENT)
Age: 80
Discharge: HOME | End: 2020-06-20
Payer: MEDICARE

## 2020-06-18 LAB
ALBUMIN SERPL-MCNC: 4 G/DL (ref 3.5–5.2)
ALP BLD-CCNC: 66 U/L (ref 35–104)
ALT SERPL-CCNC: 13 U/L (ref 0–32)
ANION GAP SERPL CALCULATED.3IONS-SCNC: 17 MMOL/L (ref 7–16)
AST SERPL-CCNC: 20 U/L (ref 0–31)
BASOPHILS ABSOLUTE: 0.03 E9/L (ref 0–0.2)
BASOPHILS RELATIVE PERCENT: 0.3 % (ref 0–2)
BILIRUB SERPL-MCNC: 0.5 MG/DL (ref 0–1.2)
BUN BLDV-MCNC: 14 MG/DL (ref 8–23)
CALCIUM SERPL-MCNC: 9.4 MG/DL (ref 8.6–10.2)
CHLORIDE BLD-SCNC: 100 MMOL/L (ref 98–107)
CHOLESTEROL, TOTAL: 150 MG/DL (ref 0–199)
CO2: 24 MMOL/L (ref 22–29)
CREAT SERPL-MCNC: 0.7 MG/DL (ref 0.5–1)
EOSINOPHILS ABSOLUTE: 0.14 E9/L (ref 0.05–0.5)
EOSINOPHILS RELATIVE PERCENT: 1.5 % (ref 0–6)
GFR AFRICAN AMERICAN: >60
GFR NON-AFRICAN AMERICAN: >60 ML/MIN/1.73
GLUCOSE BLD-MCNC: 98 MG/DL (ref 74–99)
HCT VFR BLD CALC: 46.9 % (ref 34–48)
HDLC SERPL-MCNC: 36 MG/DL
HEMOGLOBIN: 14.6 G/DL (ref 11.5–15.5)
IMMATURE GRANULOCYTES #: 0.03 E9/L
IMMATURE GRANULOCYTES %: 0.3 % (ref 0–5)
LDL CHOLESTEROL CALCULATED: 84 MG/DL (ref 0–99)
LYMPHOCYTES ABSOLUTE: 2.47 E9/L (ref 1.5–4)
LYMPHOCYTES RELATIVE PERCENT: 25.7 % (ref 20–42)
MCH RBC QN AUTO: 31.6 PG (ref 26–35)
MCHC RBC AUTO-ENTMCNC: 31.1 % (ref 32–34.5)
MCV RBC AUTO: 101.5 FL (ref 80–99.9)
MONOCYTES ABSOLUTE: 0.84 E9/L (ref 0.1–0.95)
MONOCYTES RELATIVE PERCENT: 8.7 % (ref 2–12)
NEUTROPHILS ABSOLUTE: 6.1 E9/L (ref 1.8–7.3)
NEUTROPHILS RELATIVE PERCENT: 63.5 % (ref 43–80)
PDW BLD-RTO: 12.9 FL (ref 11.5–15)
PLATELET # BLD: 265 E9/L (ref 130–450)
PMV BLD AUTO: 11.4 FL (ref 7–12)
POTASSIUM SERPL-SCNC: 4.6 MMOL/L (ref 3.5–5)
RBC # BLD: 4.62 E12/L (ref 3.5–5.5)
SODIUM BLD-SCNC: 141 MMOL/L (ref 132–146)
T4 TOTAL: 6.8 MCG/DL (ref 4.5–11.7)
TOTAL PROTEIN: 7.3 G/DL (ref 6.4–8.3)
TRIGL SERPL-MCNC: 148 MG/DL (ref 0–149)
TSH SERPL DL<=0.05 MIU/L-ACNC: 2.9 UIU/ML (ref 0.27–4.2)
VLDLC SERPL CALC-MCNC: 30 MG/DL
WBC # BLD: 9.6 E9/L (ref 4.5–11.5)

## 2020-06-18 PROCEDURE — 80061 LIPID PANEL: CPT

## 2020-06-18 PROCEDURE — 84436 ASSAY OF TOTAL THYROXINE: CPT

## 2020-06-18 PROCEDURE — 36415 COLL VENOUS BLD VENIPUNCTURE: CPT

## 2020-06-18 PROCEDURE — 85025 COMPLETE CBC W/AUTO DIFF WBC: CPT

## 2020-06-18 PROCEDURE — 80053 COMPREHEN METABOLIC PANEL: CPT

## 2020-06-18 PROCEDURE — 84443 ASSAY THYROID STIM HORMONE: CPT

## 2020-06-25 ENCOUNTER — OFFICE VISIT (OUTPATIENT)
Dept: FAMILY MEDICINE CLINIC | Age: 80
End: 2020-06-25
Payer: MEDICARE

## 2020-06-25 VITALS
DIASTOLIC BLOOD PRESSURE: 84 MMHG | HEART RATE: 99 BPM | RESPIRATION RATE: 18 BRPM | WEIGHT: 214 LBS | TEMPERATURE: 97.5 F | HEIGHT: 62 IN | OXYGEN SATURATION: 95 % | BODY MASS INDEX: 39.38 KG/M2 | SYSTOLIC BLOOD PRESSURE: 136 MMHG

## 2020-06-25 PROCEDURE — 99214 OFFICE O/P EST MOD 30 MIN: CPT | Performed by: PHYSICIAN ASSISTANT

## 2020-06-25 PROCEDURE — 93000 ELECTROCARDIOGRAM COMPLETE: CPT | Performed by: PHYSICIAN ASSISTANT

## 2020-06-25 RX ORDER — ALBUTEROL SULFATE 90 UG/1
2 AEROSOL, METERED RESPIRATORY (INHALATION) EVERY 6 HOURS PRN
Qty: 1 INHALER | Refills: 3 | Status: SHIPPED
Start: 2020-06-25 | End: 2020-10-15 | Stop reason: SDUPTHER

## 2020-06-25 RX ORDER — ROSUVASTATIN CALCIUM 5 MG/1
5 TABLET, COATED ORAL DAILY
Qty: 90 TABLET | Refills: 3 | Status: SHIPPED | OUTPATIENT
Start: 2020-06-25 | End: 2021-08-17 | Stop reason: SDUPTHER

## 2020-06-25 RX ORDER — CARVEDILOL 3.12 MG/1
3.12 TABLET ORAL 2 TIMES DAILY WITH MEALS
Qty: 180 TABLET | Refills: 3 | Status: SHIPPED | OUTPATIENT
Start: 2020-06-25 | End: 2021-07-19 | Stop reason: SDUPTHER

## 2020-06-25 ASSESSMENT — ENCOUNTER SYMPTOMS
GASTROINTESTINAL NEGATIVE: 1
ALLERGIC/IMMUNOLOGIC NEGATIVE: 1
EYES NEGATIVE: 1
SHORTNESS OF BREATH: 1

## 2020-06-25 NOTE — PROGRESS NOTES
Chief Complaint   Patient presents with    6 Month Follow-Up    Hypertension    Shortness of Breath     Pt states this has been for the last 3-4 weeks, states its intermitent. HPI:  Patient presents today for          Current Outpatient Medications:     Multiple Vitamins-Minerals (CENTRUM PO), Take by mouth, Disp: , Rfl:     Cholecalciferol (VITAMIN D3 PO), Take by mouth, Disp: , Rfl:     carvedilol (COREG) 3.125 MG tablet, Take 1 tablet by mouth 2 times daily (with meals) Instructed to take am of procedure, Disp: 180 tablet, Rfl: 3    rosuvastatin (CRESTOR) 5 MG tablet, Take 1 tablet by mouth daily, Disp: 90 tablet, Rfl: 3       No Known Allergies      Review of Systems  Review of Systems      VS:  /84   Pulse 99   Temp 97.5 °F (36.4 °C) (Temporal)   Resp 18   Ht 5' 2\" (1.575 m)   Wt 214 lb (97.1 kg)   SpO2 95%   BMI 39.14 kg/m²     Patient's medical, social, and family history reviewed      Physical Exam  Physical Exam      Assessment/Plan:  Kelsey Conklin was seen today for 6 month follow-up, hypertension and shortness of breath. Diagnoses and all orders for this visit:    Essential hypertension  Comments:  -stable  --labs for 6 months  Orders:  -     carvedilol (COREG) 3.125 MG tablet; Take 1 tablet by mouth 2 times daily (with meals) Instructed to take am of procedure    Mixed hyperlipidemia  Comments:  -fasting blood work today  -labs 6 months before next appt  Orders:  -     rosuvastatin (CRESTOR) 5 MG tablet;  Take 1 tablet by mouth daily            Tyson Zamora PA-C
and I will compare with the one in February. She did have the echocardiogram.  I will leave it up to Dr. Bryan Huggins whether to get a cardiology consult in the future. EKG-compared the EKG today with the EKG done on 2/26/2020. There were no changes. She had some poor R wave progression which could be secondary to pulmonary disease.   Tricia Shaffer PA-C

## 2020-07-20 ENCOUNTER — OFFICE VISIT (OUTPATIENT)
Dept: PRIMARY CARE CLINIC | Age: 80
End: 2020-07-20
Payer: MEDICARE

## 2020-07-20 VITALS
RESPIRATION RATE: 16 BRPM | TEMPERATURE: 97.7 F | WEIGHT: 213 LBS | DIASTOLIC BLOOD PRESSURE: 76 MMHG | HEART RATE: 100 BPM | SYSTOLIC BLOOD PRESSURE: 130 MMHG | BODY MASS INDEX: 39.2 KG/M2 | OXYGEN SATURATION: 93 % | HEIGHT: 62 IN

## 2020-07-20 PROBLEM — R53.83 FATIGUE: Status: ACTIVE | Noted: 2020-07-20

## 2020-07-20 PROCEDURE — 4040F PNEUMOC VAC/ADMIN/RCVD: CPT | Performed by: FAMILY MEDICINE

## 2020-07-20 PROCEDURE — G8400 PT W/DXA NO RESULTS DOC: HCPCS | Performed by: FAMILY MEDICINE

## 2020-07-20 PROCEDURE — 1090F PRES/ABSN URINE INCON ASSESS: CPT | Performed by: FAMILY MEDICINE

## 2020-07-20 PROCEDURE — G8427 DOCREV CUR MEDS BY ELIG CLIN: HCPCS | Performed by: FAMILY MEDICINE

## 2020-07-20 PROCEDURE — G8417 CALC BMI ABV UP PARAM F/U: HCPCS | Performed by: FAMILY MEDICINE

## 2020-07-20 PROCEDURE — 99214 OFFICE O/P EST MOD 30 MIN: CPT | Performed by: FAMILY MEDICINE

## 2020-07-20 PROCEDURE — 1036F TOBACCO NON-USER: CPT | Performed by: FAMILY MEDICINE

## 2020-07-20 PROCEDURE — 1123F ACP DISCUSS/DSCN MKR DOCD: CPT | Performed by: FAMILY MEDICINE

## 2020-07-20 ASSESSMENT — ENCOUNTER SYMPTOMS
EYE PAIN: 0
SINUS PRESSURE: 0
CHEST TIGHTNESS: 0
DIARRHEA: 0
VISUAL CHANGE: 0
ABDOMINAL PAIN: 0
NAUSEA: 0
BLOOD IN STOOL: 0
COUGH: 0
CONSTIPATION: 0
APNEA: 0
BACK PAIN: 0
RHINORRHEA: 0
WHEEZING: 0
VOMITING: 0
SHORTNESS OF BREATH: 1
COLOR CHANGE: 0
EYE ITCHING: 0
SORE THROAT: 0
EYE REDNESS: 0

## 2020-07-20 NOTE — PROGRESS NOTES
Chief Complaint:     Chief Complaint   Patient presents with    Established New Doctor     use to see dr Nancie Spatz    Fatigue    Shortness of Breath     intermittent    Hyperlipidemia         Fatigue   This is a recurrent problem. The current episode started more than 1 month ago. The problem occurs daily. The problem has been waxing and waning. Associated symptoms include fatigue. Pertinent negatives include no abdominal pain, arthralgias, chest pain, chills, congestion, coughing, diaphoresis, fever, headaches, myalgias, nausea, neck pain, numbness, rash, sore throat, vertigo, visual change, vomiting or weakness. Nothing aggravates the symptoms. She has tried nothing for the symptoms. The treatment provided no relief. Shortness of Breath   This is a recurrent problem. The current episode started more than 1 month ago. The problem has been waxing and waning. Pertinent negatives include no abdominal pain, chest pain, coryza, ear pain, fever, headaches, leg swelling, neck pain, rash, rhinorrhea, sore throat, vomiting or wheezing. Nothing aggravates the symptoms. The patient has no known risk factors for DVT/PE. She has tried nothing for the symptoms. The treatment provided no relief. There is no history of a heart failure. Hypertension   This is a chronic problem. The current episode started more than 1 year ago. The problem is unchanged. The problem is controlled. Associated symptoms include shortness of breath. Pertinent negatives include no chest pain, headaches, neck pain or palpitations. There are no known risk factors for coronary artery disease. Past treatments include beta blockers. The current treatment provides significant improvement. There are no compliance problems. There is no history of CAD/MI, CVA, heart failure, left ventricular hypertrophy or PVD. There is no history of a hypertension causing med, pheochromocytoma, renovascular disease, sleep apnea or a thyroid problem.        Patient Active Normal appearance. She is well-developed. HENT:      Head: Normocephalic and atraumatic. Right Ear: Hearing, tympanic membrane and external ear normal. No tenderness. No middle ear effusion. Left Ear: Hearing, tympanic membrane and external ear normal. No tenderness. No middle ear effusion. Nose: Nose normal. No congestion or rhinorrhea. Right Turbinates: Not enlarged. Left Turbinates: Not enlarged. Mouth/Throat:      Mouth: Mucous membranes are moist.      Tongue: No lesions. Pharynx: Oropharynx is clear. No oropharyngeal exudate or posterior oropharyngeal erythema. Eyes:      General: No scleral icterus. Conjunctiva/sclera: Conjunctivae normal.      Pupils: Pupils are equal, round, and reactive to light. Neck:      Musculoskeletal: Normal range of motion and neck supple. No neck rigidity or muscular tenderness. Thyroid: No thyromegaly. Cardiovascular:      Rate and Rhythm: Normal rate and regular rhythm. Heart sounds: Normal heart sounds. No murmur. Pulmonary:      Effort: Pulmonary effort is normal. No respiratory distress. Breath sounds: Normal breath sounds. No wheezing or rales. Abdominal:      General: Bowel sounds are normal. There is no distension. Palpations: Abdomen is soft. Tenderness: There is no abdominal tenderness. Musculoskeletal: Normal range of motion. General: No tenderness. Lymphadenopathy:      Cervical: No cervical adenopathy. Skin:     General: Skin is warm and dry. Findings: No erythema or rash. Neurological:      General: No focal deficit present. Mental Status: She is alert and oriented to person, place, and time. Cranial Nerves: No cranial nerve deficit. Deep Tendon Reflexes: Reflexes are normal and symmetric.  Reflexes normal.   Psychiatric:         Mood and Affect: Mood normal.                                 ASSESSMENT/PLAN:    Patient Active Problem List   Diagnosis    Essential hypertension    Mixed hyperlipidemia    Fatigue       Dilia Dela Cruz was seen today for established new doctor, fatigue, shortness of breath and hyperlipidemia. Diagnoses and all orders for this visit:    Essential hypertension    Mixed hyperlipidemia    Dyspnea on exertion  -     Yanci - Olive Loyd DO, Cardiology, [place]    Fatigue, unspecified type    AV block, 1st degree  -     [address] [place] [address], Yaa Estrada DO, Cardiology, [place]          Return in about 6 months (around 1/20/2021). I spent 30 minutes with this patient. I spent greater than 50% of the time counseling this patient.         Jaimie Santos DO  7/20/2020  1:51 PM

## 2020-07-23 ENCOUNTER — TELEPHONE (OUTPATIENT)
Dept: CARDIOLOGY CLINIC | Age: 80
End: 2020-07-23

## 2020-07-23 NOTE — TELEPHONE ENCOUNTER
Patient Appointment Form:      PCP: Dr Kyara Wallace  Referring: same    Has the Patient:    Seen a Cardiologist? yes    date:4-5 years ago  Physician:Dr Angie Dalton ,[address]    Had a heart catheterization? no    Had heart surgery? no    Had a stress test or nuclear stress test? yes   date: 4-5 yrs   facility name:  Dr Ariadna Rice    Had an echocardiogram? yes   date: 4-5 yrs   facility name:  Dr Ariadna Rice    Had a vascular ultrasound?  yes   date: Pt thinks this may have been done 4-5 years, but not sure   facility name:  Dr Ariadna Rice    Had a 24/48 heart monitor or extended cardiac event monitor? no    Had recent blood work in the last 6 months? no    Had a pacemaker/ICD/ILR implant? no    Seen an Electrophysiologist? no        Will send records via: fax      Date & time of appointment:  08-05-20 1:20 Dr Mancia Duty had labs at Dr Vivien Lundy  06-18-20    [facility] phone # Dr Owen Shoemaker [phone]  Oksana Kay

## 2020-08-05 ENCOUNTER — HOSPITAL ENCOUNTER (OUTPATIENT)
Age: 80
Discharge: HOME | End: 2020-08-05
Payer: MEDICARE

## 2020-08-05 ENCOUNTER — OFFICE VISIT (OUTPATIENT)
Dept: CARDIOLOGY CLINIC | Age: 80
End: 2020-08-05
Payer: MEDICARE

## 2020-08-05 VITALS
BODY MASS INDEX: 39.01 KG/M2 | SYSTOLIC BLOOD PRESSURE: 118 MMHG | HEIGHT: 62 IN | HEART RATE: 91 BPM | DIASTOLIC BLOOD PRESSURE: 80 MMHG | WEIGHT: 212 LBS

## 2020-08-05 LAB
D DIMER: <200 NG/ML DDU
PRO-BNP: 96 PG/ML (ref 0–450)

## 2020-08-05 PROCEDURE — 93000 ELECTROCARDIOGRAM COMPLETE: CPT | Performed by: INTERNAL MEDICINE

## 2020-08-05 PROCEDURE — 36415 COLL VENOUS BLD VENIPUNCTURE: CPT

## 2020-08-05 PROCEDURE — 4040F PNEUMOC VAC/ADMIN/RCVD: CPT | Performed by: INTERNAL MEDICINE

## 2020-08-05 PROCEDURE — 1090F PRES/ABSN URINE INCON ASSESS: CPT | Performed by: INTERNAL MEDICINE

## 2020-08-05 PROCEDURE — 83880 ASSAY OF NATRIURETIC PEPTIDE: CPT

## 2020-08-05 PROCEDURE — 1123F ACP DISCUSS/DSCN MKR DOCD: CPT | Performed by: INTERNAL MEDICINE

## 2020-08-05 PROCEDURE — 1036F TOBACCO NON-USER: CPT | Performed by: INTERNAL MEDICINE

## 2020-08-05 PROCEDURE — G8400 PT W/DXA NO RESULTS DOC: HCPCS | Performed by: INTERNAL MEDICINE

## 2020-08-05 PROCEDURE — G8427 DOCREV CUR MEDS BY ELIG CLIN: HCPCS | Performed by: INTERNAL MEDICINE

## 2020-08-05 PROCEDURE — 85378 FIBRIN DEGRADE SEMIQUANT: CPT

## 2020-08-05 PROCEDURE — G8417 CALC BMI ABV UP PARAM F/U: HCPCS | Performed by: INTERNAL MEDICINE

## 2020-08-05 PROCEDURE — 99204 OFFICE O/P NEW MOD 45 MIN: CPT | Performed by: INTERNAL MEDICINE

## 2020-08-05 NOTE — PROGRESS NOTES
Patient Active Problem List   Diagnosis    Essential hypertension    Mixed hyperlipidemia    Fatigue       Current Outpatient Medications   Medication Sig Dispense Refill    Multiple Vitamins-Minerals (CENTRUM PO) Take by mouth      Cholecalciferol (VITAMIN D3 PO) Take 2,000 Units by mouth       carvedilol (COREG) 3.125 MG tablet Take 1 tablet by mouth 2 times daily (with meals) Instructed to take am of procedure 180 tablet 3    rosuvastatin (CRESTOR) 5 MG tablet Take 1 tablet by mouth daily 90 tablet 3    albuterol sulfate  (90 Base) MCG/ACT inhaler Inhale 2 puffs into the lungs every 6 hours as needed for Wheezing 1 Inhaler 3     No current facility-administered medications for this visit. CC:    Patient is seen in Initial Evaluation for:  1. RAMOS (dyspnea on exertion)    2. Essential hypertension    3. Mixed hyperlipidemia        HPI:  Patient is seen for evaluation of shortness of breath with exertion. She relates this started about 6 to 7 weeks ago. Notes that she is now having difficulty completing a shower due to her shortness of breath. She denies any wheezing or sputum production. She has no PND/ orthopnea. She is not short of breath at rest.  No history of chest pain. Denies any heart racing or fluttering. Has a chronic mild cough. Notes that her pulse ox is 95 to 96% when she checks it at home.     ROS:   General: No unusual weight gain, change in exercise tolerance  Skin: No rash or itching  EENT: No vision changes or nosebleeds  Cardiovascular: No orthopnea or paroxysmal nocturnal dyspnea  Respiratory: No cough or hemoptysis  Gastrointestinal: No hematemesis or recent changes in bowel habits  Genitourinary: No hematuria, urgency or frequency  Musculoskeletal: No muscular weakness or joint swelling   Neurologic / Psychiatric: No incoordination or convulsions  Allergic / Immunologic/ Lymphatic / Endocrine: No anemia or bleeding tendency    Social History     Socioeconomic History    Marital status:      Spouse name: Not on file    Number of children: Not on file    Years of education: Not on file    Highest education level: Not on file   Occupational History    Not on file   Social Needs    Financial resource strain: Not on file    Food insecurity     Worry: Not on file     Inability: Not on file    Transportation needs     Medical: Not on file     Non-medical: Not on file   Tobacco Use    Smoking status: Never Smoker    Smokeless tobacco: Never Used   Substance and Sexual Activity    Alcohol use: No    Drug use: No    Sexual activity: Not on file   Lifestyle    Physical activity     Days per week: Not on file     Minutes per session: Not on file    Stress: Not on file   Relationships    Social connections     Talks on phone: Not on file     Gets together: Not on file     Attends [employer] service: Not on file     Active member of club or organization: Not on file     Attends meetings of clubs or organizations: Not on file     Relationship status: Not on file    Intimate partner violence     Fear of current or ex partner: Not on file     Emotionally abused: Not on file     Physically abused: Not on file     Forced sexual activity: Not on file   Other Topics Concern    Not on file   Social History Narrative    Not on file       Family History   Problem Relation Age of Onset    Heart Attack Father        Past Medical History:   Diagnosis Date    Hyperlipidemia     Hypertension     On aspirin at home     pt has history of htn       PHYSICAL EXAM:  CONSTITUTIONAL:  Well developed, well nourished    Vitals:    08/05/20 1330   BP: 118/80   Pulse: 91   Weight: 212 lb (96.2 kg)   Height: 5' 2\" (1.575 m)     HEAD & FACE: Normocephalic. Symmetric. EYES: No xanthelasma. Conjunctivae not injected. EARS, NOSE, MOUTH & THROAT: Good dentition. No oral pallor or cyanosis. NECK: No JVD at 30 degrees. No thyromegaly.   RESPIRATORY: Clear to auscultation and percussion in all fields. No use of accessory muscle or intercostal retractions. CARDIOVASCULAR: Regular rate and rhythm. No lifts or thrills on palpitation. Auscultation with normal S1-S2 in intensity and splitting. Grade 2/6 early peaking systolic ejection murmur heard best the right upper sternal border without radiation. No carotid bruits. Abdominal aorta not enlarged. Femoral arteries without bruits. Pedal pulses 2+. No edema. ABDOMEN: Soft without hepatic or splenic enlargement. No tenderness. MUSCULOSKELETAL: No kyphosis or scoliosis of the back. Good muscle strength and tone. No muscle atrophy. Normal gait and ability to undergo exercise stress testing. EXTREMITIES: No clubbing or cyanosis. SKIN: No Xanthomas or ulcerations. NEUROLOGIC: Oriented to time, place and person. Normal mood and affect. LYMPHATIC:  No palpable neck or supraclavicular nodes. No splenomegaly. EKG: the EKG tracing was reviewed and found to reveal: Normal sinus rhythm. No change compared to prior tracing dated 10/29/2013. ASSESSMENT:                                                     ORDERS:       Diagnosis Orders   1. RAMOS (dyspnea on exertion)  Brain Natriuretic Peptide    D-DIMER, QUANTITATIVE    NM Cardiac Stress Test Nuclear Imaging    Cardiac Stress Test - w/Pharm   2. Essential hypertension  EKG 12 lead   3. Mixed hyperlipidemia       Above assessment cardiac issues stable. PLAN:   See above orders. Medication reconciliation completed. Old records were reviewed and found to reveal: Chest x-ray 2/26/2020 interpreted as within normal limits. Echocardiography dated 3/13/2020 with normal LV size and function and mild aortic valve stenosis noted  Discussed issues that would prompt earlier evaluation. Same cardiac medications.     Follow-up office visit - pending above evaluations

## 2020-08-06 ENCOUNTER — TELEPHONE (OUTPATIENT)
Dept: CARDIOLOGY CLINIC | Age: 80
End: 2020-08-06

## 2020-08-06 NOTE — TELEPHONE ENCOUNTER
----- Message from Kevin Bashir MD sent at 8/5/2020  5:26 PM EDT -----  Please let patient know that test for congestive heart failure and blood clots were both negative.

## 2020-09-14 ENCOUNTER — TELEPHONE (OUTPATIENT)
Dept: CARDIOLOGY | Age: 80
End: 2020-09-14

## 2020-09-14 NOTE — TELEPHONE ENCOUNTER
Spoke with patient and confirmed Lexiscan stress test appointment on September 16, 2020 at 1030. Instructions for test and COVID -19 preprocedure checklist reviewed with patient.

## 2020-09-16 ENCOUNTER — HOSPITAL ENCOUNTER (OUTPATIENT)
Dept: CARDIOLOGY | Age: 80
Discharge: HOME | End: 2020-09-16
Payer: MEDICARE

## 2020-09-16 VITALS
BODY MASS INDEX: 39.01 KG/M2 | DIASTOLIC BLOOD PRESSURE: 70 MMHG | WEIGHT: 212 LBS | HEART RATE: 75 BPM | SYSTOLIC BLOOD PRESSURE: 116 MMHG | TEMPERATURE: 96.1 F | HEIGHT: 62 IN

## 2020-09-16 PROCEDURE — 3430000000 HC RX DIAGNOSTIC RADIOPHARMACEUTICAL: Performed by: INTERNAL MEDICINE

## 2020-09-16 PROCEDURE — 2580000003 HC RX 258: Performed by: INTERNAL MEDICINE

## 2020-09-16 PROCEDURE — 93017 CV STRESS TEST TRACING ONLY: CPT

## 2020-09-16 PROCEDURE — A9500 TC99M SESTAMIBI: HCPCS | Performed by: INTERNAL MEDICINE

## 2020-09-16 PROCEDURE — 78452 HT MUSCLE IMAGE SPECT MULT: CPT

## 2020-09-16 PROCEDURE — 6360000002 HC RX W HCPCS: Performed by: INTERNAL MEDICINE

## 2020-09-16 RX ORDER — SODIUM CHLORIDE 0.9 % (FLUSH) 0.9 %
10 SYRINGE (ML) INJECTION PRN
Status: DISCONTINUED | OUTPATIENT
Start: 2020-09-16 | End: 2020-09-17 | Stop reason: HOSPADM

## 2020-09-16 RX ADMIN — SODIUM CHLORIDE, PRESERVATIVE FREE 10 ML: 5 INJECTION INTRAVENOUS at 11:07

## 2020-09-16 RX ADMIN — SODIUM CHLORIDE, PRESERVATIVE FREE 10 ML: 5 INJECTION INTRAVENOUS at 11:06

## 2020-09-16 RX ADMIN — REGADENOSON 0.4 MG: 0.08 INJECTION, SOLUTION INTRAVENOUS at 11:06

## 2020-09-16 RX ADMIN — Medication 30.9 MILLICURIE: at 11:06

## 2020-09-16 RX ADMIN — SODIUM CHLORIDE, PRESERVATIVE FREE 10 ML: 5 INJECTION INTRAVENOUS at 09:52

## 2020-09-16 RX ADMIN — Medication 10.8 MILLICURIE: at 09:52

## 2020-09-16 NOTE — PROCEDURES
[address] [facility] [address] [phone]                Pharmacologic Stress Nuclear Gated SPECT Study    Name: [facility] Account Number: [de-identified]    :  1940          Sex: female         Date of Study:  2020    Height: 5' 2\" (157.5 cm)         Weight: 212 lb (96.2 kg)     Ordering Provider: Joshua Joseph. Terernce Gasca MD          PCP: Isa Rizzo DO      Cardiologist: Joshua Joseph. Terrence Gasca MD             Interpreting Physician: Bella Rodgers MD  _________________________________________________________________________________    Indication:   Detecting the presence and location of coronary artery disease    Clinical History:   Patient has no known history of coronary artery disease. Resting ECG:    Normal sinus rhythm and Nonspecific ST-T wave changes    Procedure:   Pharmacologic stress testing was performed with regadenoson 0.4 mg for 15 seconds. The heart rate was 75 at baseline and farooq to 127 beats during the infusion. This corresponds to 90% of maximum predicted heart rate. The blood pressure at baseline was 116/70 and blood pressure at the end of infusion was 108/60. Blood pressure response was normal during the stress procedure. The patient experienced mild increased breathing post infusion. Symptom resolved. ECG during the infusion did not change. IMAGING: Myocardial perfusion imaging was performed at rest 30-35 minutes following the intravenous injection of 10.8 mCi of (Tc-Sestamibi) followed by 10 ml of Normal Saline. As per infusion protocol, the patient was injected intravenously with 30.9 mCi of (Tc-Sestamibi) followed by 10 ml of Normal Saline. Gated post-stress tomographic imaging was performed 20-25 minutes after stress. FINDINGS: The overall quality of the study was fair.      Left ventricular cavity size was noted to be normal.    Rotational analog analysis demonstrated soft tissue breast attenuation. The gated SPECT stress imaging in the short, vertical long, and horizontal long axis demonstrated normal homogeneous tracer distribution throughout the myocardium. The resting images are normal.     Gated SPECT left ventricular ejection fraction was calculated to be 71%, with normal myocardial thickening and wall motion. Impression:    1. ECG during the infusion did not change. 2. The myocardial perfusion imaging was normal with attenuation artifact. 3. Overall left ventricular systolic function was normal without regional wall motion abnormalities. 4. Low risk general pharmacologic stress test.    Thank you for sending your patient to this [facility].      Electronically signed by Isaac Benedict DO on 9/16/20 at 1:05 PM EDT

## 2020-09-17 ENCOUNTER — TELEPHONE (OUTPATIENT)
Dept: CARDIOLOGY CLINIC | Age: 80
End: 2020-09-17

## 2020-09-17 NOTE — TELEPHONE ENCOUNTER
----- Message from Emmie Alcantara MD sent at 9/16/2020  1:19 PM EDT -----  Please let patient know that stress test came out okay. Would like to proceed with an echocardiogram to complete her evaluation.

## 2020-09-22 ENCOUNTER — TELEPHONE (OUTPATIENT)
Dept: CARDIOLOGY CLINIC | Age: 80
End: 2020-09-22

## 2020-09-22 NOTE — TELEPHONE ENCOUNTER
Patient called in and stated she had a stress test done on 09/16/2020 and she stated she woke up Saturday to her arm hurting (the one they had the IV in) from the shoulder to the elbow. She states she cannot lift her arm. She is wondering if this would have anything to do with it.   Please advise

## 2020-10-14 ENCOUNTER — TELEPHONE (OUTPATIENT)
Dept: PRIMARY CARE CLINIC | Age: 80
End: 2020-10-14

## 2020-10-15 ENCOUNTER — HOSPITAL ENCOUNTER (OUTPATIENT)
Age: 80
Discharge: HOME | End: 2020-10-17
Payer: MEDICARE

## 2020-10-15 ENCOUNTER — OFFICE VISIT (OUTPATIENT)
Dept: PRIMARY CARE CLINIC | Age: 80
End: 2020-10-15
Payer: MEDICARE

## 2020-10-15 VITALS
TEMPERATURE: 97.9 F | WEIGHT: 211.4 LBS | BODY MASS INDEX: 38.9 KG/M2 | HEART RATE: 114 BPM | HEIGHT: 62 IN | SYSTOLIC BLOOD PRESSURE: 124 MMHG | OXYGEN SATURATION: 91 % | RESPIRATION RATE: 16 BRPM | DIASTOLIC BLOOD PRESSURE: 78 MMHG

## 2020-10-15 PROCEDURE — U0003 INFECTIOUS AGENT DETECTION BY NUCLEIC ACID (DNA OR RNA); SEVERE ACUTE RESPIRATORY SYNDROME CORONAVIRUS 2 (SARS-COV-2) (CORONAVIRUS DISEASE [COVID-19]), AMPLIFIED PROBE TECHNIQUE, MAKING USE OF HIGH THROUGHPUT TECHNOLOGIES AS DESCRIBED BY CMS-2020-01-R: HCPCS

## 2020-10-15 PROCEDURE — G8484 FLU IMMUNIZE NO ADMIN: HCPCS | Performed by: FAMILY MEDICINE

## 2020-10-15 PROCEDURE — G8427 DOCREV CUR MEDS BY ELIG CLIN: HCPCS | Performed by: FAMILY MEDICINE

## 2020-10-15 PROCEDURE — 1123F ACP DISCUSS/DSCN MKR DOCD: CPT | Performed by: FAMILY MEDICINE

## 2020-10-15 PROCEDURE — 4040F PNEUMOC VAC/ADMIN/RCVD: CPT | Performed by: FAMILY MEDICINE

## 2020-10-15 PROCEDURE — G8400 PT W/DXA NO RESULTS DOC: HCPCS | Performed by: FAMILY MEDICINE

## 2020-10-15 PROCEDURE — G8417 CALC BMI ABV UP PARAM F/U: HCPCS | Performed by: FAMILY MEDICINE

## 2020-10-15 PROCEDURE — 1090F PRES/ABSN URINE INCON ASSESS: CPT | Performed by: FAMILY MEDICINE

## 2020-10-15 PROCEDURE — 99213 OFFICE O/P EST LOW 20 MIN: CPT | Performed by: FAMILY MEDICINE

## 2020-10-15 PROCEDURE — 1036F TOBACCO NON-USER: CPT | Performed by: FAMILY MEDICINE

## 2020-10-15 RX ORDER — ALBUTEROL SULFATE 90 UG/1
2 AEROSOL, METERED RESPIRATORY (INHALATION) EVERY 6 HOURS PRN
Qty: 1 INHALER | Refills: 3 | Status: SHIPPED | OUTPATIENT
Start: 2020-10-15

## 2020-10-15 RX ORDER — PREDNISONE 10 MG/1
TABLET ORAL
Qty: 18 TABLET | Refills: 0 | Status: SHIPPED
Start: 2020-10-15 | End: 2021-01-21

## 2020-10-15 RX ORDER — DOXYCYCLINE HYCLATE 100 MG
100 TABLET ORAL 2 TIMES DAILY
Qty: 20 TABLET | Refills: 0 | Status: SHIPPED | OUTPATIENT
Start: 2020-10-15 | End: 2020-10-25

## 2020-10-15 ASSESSMENT — ENCOUNTER SYMPTOMS
COLOR CHANGE: 0
EYE PAIN: 0
BLOOD IN STOOL: 0
EYE REDNESS: 0
NAUSEA: 0
RHINORRHEA: 0
ABDOMINAL PAIN: 0
DIARRHEA: 0
EYE ITCHING: 0
WHEEZING: 0
COUGH: 1
SORE THROAT: 1
VOMITING: 0
CHEST TIGHTNESS: 0
SINUS PRESSURE: 0
SHORTNESS OF BREATH: 1
APNEA: 0
BACK PAIN: 0
CONSTIPATION: 0

## 2020-10-15 NOTE — PROGRESS NOTES
Chief Complaint:     Chief Complaint   Patient presents with    Cough     started sunday/ does have chronic cough     Congestion     clowly getting worse    Pharyngitis    Shortness of Breath         Cough   This is a new problem. The current episode started in the past 7 days. The problem has been unchanged. The cough is non-productive. Associated symptoms include nasal congestion, postnasal drip, a sore throat and shortness of breath. Pertinent negatives include no chest pain, ear pain, eye redness, fever, headaches, myalgias, rash, rhinorrhea, sweats, weight loss or wheezing. She has tried OTC cough suppressant for the symptoms. The treatment provided no relief. There is no history of environmental allergies. Pharyngitis   This is a new problem. The current episode started in the past 7 days. The problem occurs daily. The problem has been unchanged. Associated symptoms include coughing and a sore throat. Pertinent negatives include no abdominal pain, arthralgias, chest pain, congestion, fatigue, fever, headaches, myalgias, nausea, neck pain, numbness, rash, vomiting or weakness. Nothing aggravates the symptoms. She has tried nothing for the symptoms. The treatment provided no relief. Shortness of Breath   This is a recurrent problem. The current episode started more than 1 month ago. The problem occurs intermittently. The problem has been waxing and waning. Associated symptoms include a sore throat. Pertinent negatives include no abdominal pain, chest pain, ear pain, fever, headaches, leg swelling, neck pain, rash, rhinorrhea, vomiting or wheezing. Nothing aggravates the symptoms. She has tried nothing for the symptoms. The treatment provided no relief.        Patient Active Problem List   Diagnosis    Essential hypertension    Mixed hyperlipidemia    Fatigue       Past Medical History:   Diagnosis Date    Hyperlipidemia     Hypertension        Past Surgical History:   Procedure Laterality Date    BREAST SURGERY Right     lumpectomy    CHOLECYSTECTOMY      COLONOSCOPY  02/20/2017    TUBAL LIGATION         Current Outpatient Medications   Medication Sig Dispense Refill    doxycycline hyclate (VIBRA-TABS) 100 MG tablet Take 1 tablet by mouth 2 times daily for 10 days 20 tablet 0    predniSONE (DELTASONE) 10 MG tablet 30mg daily x3 days, then 20mg daily x3 days, then 10mg daily x3 days 18 tablet 0    albuterol sulfate  (90 Base) MCG/ACT inhaler Inhale 2 puffs into the lungs every 6 hours as needed for Wheezing 1 Inhaler 3    Multiple Vitamins-Minerals (CENTRUM PO) Take by mouth      Cholecalciferol (VITAMIN D3 PO) Take 2,000 Units by mouth       carvedilol (COREG) 3.125 MG tablet Take 1 tablet by mouth 2 times daily (with meals) Instructed to take am of procedure 180 tablet 3    rosuvastatin (CRESTOR) 5 MG tablet Take 1 tablet by mouth daily 90 tablet 3     No current facility-administered medications for this visit.         Allergies   Allergen Reactions    Asa [Aspirin]      History of nose bleed while on ASA required ER visit and packingof the nose       Social History     Socioeconomic History    Marital status:      Spouse name: None    Number of children: None    Years of education: None    Highest education level: None   Occupational History    Occupation: [employer] [place]  [employer] [place] [address] Financial resource strain: None    Food insecurity     Worry: None     Inability: None    Transportation needs     Medical: None     Non-medical: None   Tobacco Use    Smoking status: Never Smoker    Smokeless tobacco: Never Used   Substance and Sexual Activity    Alcohol use: No    Drug use: No    Sexual activity: None   Lifestyle    Physical activity     Days per week: None     Minutes per session: None    Stress: None   Relationships    Social connections     Talks on phone: None     Gets together: None     Attends Jew service: None Active member of club or organization: None     Attends meetings of clubs or organizations: None     Relationship status: None    Intimate partner violence     Fear of current or ex partner: None     Emotionally abused: None     Physically abused: None     Forced sexual activity: None   Other Topics Concern    None   Social History Narrative    None       Family History   Problem Relation Age of Onset    Heart Attack Father 68        massive          Review of Systems   Constitutional: Negative for activity change, appetite change, fatigue, fever and weight loss. HENT: Positive for postnasal drip and sore throat. Negative for congestion, ear pain, hearing loss, nosebleeds, rhinorrhea and sinus pressure. Eyes: Negative for pain, redness, itching and visual disturbance. Respiratory: Positive for cough and shortness of breath. Negative for apnea, chest tightness and wheezing. Cardiovascular: Negative for chest pain, palpitations and leg swelling. Gastrointestinal: Negative for abdominal pain, blood in stool, constipation, diarrhea, nausea and vomiting. Endocrine: Negative. Genitourinary: Negative for decreased urine volume, difficulty urinating, dysuria, frequency, hematuria and urgency. Musculoskeletal: Negative for arthralgias, back pain, gait problem, myalgias and neck pain. Skin: Negative for color change and rash. Allergic/Immunologic: Negative for environmental allergies and food allergies. Neurological: Negative for dizziness, weakness, light-headedness, numbness and headaches. Hematological: Negative for adenopathy. Does not bruise/bleed easily. Psychiatric/Behavioral: Negative for behavioral problems, dysphoric mood and sleep disturbance. The patient is not nervous/anxious and is not hyperactive. All other systems reviewed and are negative.         /78   Pulse 114   Temp 97.9 °F (36.6 °C)   Resp 16   Ht 5' 2\" (1.575 m)   Wt 211 lb 6.4 oz (95.9 kg)   SpO2 91% BMI 38.67 kg/m²     Physical Exam  Vitals signs and nursing note reviewed. Constitutional:       General: She is not in acute distress. Appearance: Normal appearance. She is well-developed. She is not toxic-appearing. HENT:      Head: Normocephalic and atraumatic. Right Ear: Hearing, ear canal and external ear normal. No tenderness. A middle ear effusion is present. Tympanic membrane is bulging. Left Ear: Hearing, ear canal and external ear normal. No tenderness. A middle ear effusion is present. Tympanic membrane is bulging. Nose: Mucosal edema, congestion and rhinorrhea present. No nasal deformity or septal deviation. Right Turbinates: Enlarged and swollen. Left Turbinates: Enlarged and swollen. Right Sinus: Maxillary sinus tenderness present. Left Sinus: Maxillary sinus tenderness present. Mouth/Throat:      Mouth: Mucous membranes are moist. Mucous membranes are not pale and not dry. Tongue: No lesions. Pharynx: Uvula midline. No oropharyngeal exudate or posterior oropharyngeal erythema. Tonsils: No tonsillar abscesses. Eyes:      General: Lids are normal. No scleral icterus. Right eye: No discharge. Left eye: No discharge. Extraocular Movements: Extraocular movements intact. Right eye: Normal extraocular motion. Left eye: Normal extraocular motion. Conjunctiva/sclera: Conjunctivae normal.      Right eye: Right conjunctiva is not injected. Left eye: Left conjunctiva is not injected. Pupils: Pupils are equal, round, and reactive to light. Neck:      Musculoskeletal: Normal range of motion and neck supple. No neck rigidity or muscular tenderness. Thyroid: No thyromegaly. Cardiovascular:      Rate and Rhythm: Normal rate and regular rhythm. Heart sounds: Normal heart sounds. No murmur. Pulmonary:      Effort: Pulmonary effort is normal. No respiratory distress.       Breath sounds: Rhonchi present. No wheezing or rales. Chest:      Chest wall: No tenderness. Abdominal:      General: Bowel sounds are normal. There is no distension. Palpations: Abdomen is soft. Tenderness: There is no abdominal tenderness. Musculoskeletal: Normal range of motion. General: No tenderness. Lymphadenopathy:      Cervical: Cervical adenopathy present. Skin:     General: Skin is warm and dry. Findings: No erythema or rash. Neurological:      General: No focal deficit present. Mental Status: She is alert and oriented to person, place, and time. Cranial Nerves: No cranial nerve deficit. Deep Tendon Reflexes: Reflexes are normal and symmetric. Reflexes normal.   Psychiatric:         Mood and Affect: Mood normal.                                 ASSESSMENT/PLAN:    Patient Active Problem List   Diagnosis    Essential hypertension    Mixed hyperlipidemia    Fatigue       Darin Barrow was seen today for cough, congestion, pharyngitis and shortness of breath. Diagnoses and all orders for this visit:    Cough  -     XR CHEST (2 VW); Future  -     Covid-19 Ambulatory; Future    SOB (shortness of breath)  -     XR CHEST (2 VW); Future  -     Covid-19 Ambulatory; Future    Other orders  -     doxycycline hyclate (VIBRA-TABS) 100 MG tablet; Take 1 tablet by mouth 2 times daily for 10 days  -     predniSONE (DELTASONE) 10 MG tablet; 30mg daily x3 days, then 20mg daily x3 days, then 10mg daily x3 days  -     albuterol sulfate  (90 Base) MCG/ACT inhaler; Inhale 2 puffs into the lungs every 6 hours as needed for Wheezing          Return if symptoms worsen or fail to improve. I spent 15 minutes with this patient. I spent greater than 50% of the time counseling this patient.         Kristina Lugo DO  10/15/2020  2:02 PM

## 2020-10-17 LAB
SARS-COV-2: NOT DETECTED
SOURCE: NORMAL

## 2020-11-13 ENCOUNTER — TELEPHONE (OUTPATIENT)
Dept: CARDIOLOGY | Age: 80
End: 2020-11-13

## 2020-11-13 NOTE — TELEPHONE ENCOUNTER
Mailbox is full. Attempted to call and schedule echo.   Electronically signed by Gasper Avendano on 11/13/2020 at 2:43 PM

## 2020-11-17 ENCOUNTER — TELEPHONE (OUTPATIENT)
Dept: CARDIOLOGY | Age: 80
End: 2020-11-17

## 2020-12-03 ENCOUNTER — TELEPHONE (OUTPATIENT)
Dept: CARDIOLOGY | Age: 80
End: 2020-12-03

## 2020-12-04 ENCOUNTER — HOSPITAL ENCOUNTER (OUTPATIENT)
Dept: CARDIOLOGY | Age: 80
Discharge: HOME | End: 2020-12-04
Payer: MEDICARE

## 2020-12-04 LAB
LV EF: 55 %
LVEF MODALITY: NORMAL

## 2020-12-04 PROCEDURE — 93306 TTE W/DOPPLER COMPLETE: CPT | Performed by: PSYCHIATRY & NEUROLOGY

## 2020-12-07 ENCOUNTER — TELEPHONE (OUTPATIENT)
Dept: CARDIOLOGY CLINIC | Age: 80
End: 2020-12-07

## 2020-12-07 NOTE — TELEPHONE ENCOUNTER
Patient notified and instructed on echo results and follow-up per Dr. Osuna March. She stated understanding.

## 2021-01-04 ENCOUNTER — TELEPHONE (OUTPATIENT)
Dept: PRIMARY CARE CLINIC | Age: 81
End: 2021-01-04

## 2021-01-04 DIAGNOSIS — R06.02 SOB (SHORTNESS OF BREATH): Primary | ICD-10-CM

## 2021-01-04 DIAGNOSIS — R06.09 DYSPNEA ON EXERTION: ICD-10-CM

## 2021-01-04 NOTE — TELEPHONE ENCOUNTER
Pt calling in to let you know that she is still having issues with breathing. Her cardiologist has not found any issues with her heart. She wants to know what the next step will be. Pt has an appt on 1/21 with you.

## 2021-01-07 ENCOUNTER — TELEPHONE (OUTPATIENT)
Dept: PRIMARY CARE CLINIC | Age: 81
End: 2021-01-07

## 2021-01-07 DIAGNOSIS — E78.2 MIXED HYPERLIPIDEMIA: ICD-10-CM

## 2021-01-07 DIAGNOSIS — I10 ESSENTIAL HYPERTENSION: Primary | ICD-10-CM

## 2021-01-14 DIAGNOSIS — I10 ESSENTIAL HYPERTENSION: ICD-10-CM

## 2021-01-14 DIAGNOSIS — E78.2 MIXED HYPERLIPIDEMIA: ICD-10-CM

## 2021-01-14 LAB
ALBUMIN SERPL-MCNC: 3.9 G/DL (ref 3.5–5.2)
ALP BLD-CCNC: 67 U/L (ref 35–104)
ALT SERPL-CCNC: 15 U/L (ref 0–32)
ANION GAP SERPL CALCULATED.3IONS-SCNC: 11 MMOL/L (ref 7–16)
AST SERPL-CCNC: 18 U/L (ref 0–31)
BILIRUB SERPL-MCNC: 0.4 MG/DL (ref 0–1.2)
BUN BLDV-MCNC: 15 MG/DL (ref 8–23)
CALCIUM SERPL-MCNC: 9.9 MG/DL (ref 8.6–10.2)
CHLORIDE BLD-SCNC: 102 MMOL/L (ref 98–107)
CHOLESTEROL, TOTAL: 144 MG/DL (ref 0–199)
CO2: 29 MMOL/L (ref 22–29)
CREAT SERPL-MCNC: 0.9 MG/DL (ref 0.5–1)
GFR AFRICAN AMERICAN: >60
GFR NON-AFRICAN AMERICAN: >60 ML/MIN/1.73
GLUCOSE BLD-MCNC: 88 MG/DL (ref 74–99)
HDLC SERPL-MCNC: 39 MG/DL
LDL CHOLESTEROL CALCULATED: 69 MG/DL (ref 0–99)
POTASSIUM SERPL-SCNC: 4.9 MMOL/L (ref 3.5–5)
SODIUM BLD-SCNC: 142 MMOL/L (ref 132–146)
TOTAL PROTEIN: 7.2 G/DL (ref 6.4–8.3)
TRIGL SERPL-MCNC: 181 MG/DL (ref 0–149)
TSH SERPL DL<=0.05 MIU/L-ACNC: 2.15 UIU/ML (ref 0.27–4.2)
VLDLC SERPL CALC-MCNC: 36 MG/DL

## 2021-01-21 ENCOUNTER — OFFICE VISIT (OUTPATIENT)
Dept: PRIMARY CARE CLINIC | Age: 81
End: 2021-01-21
Payer: MEDICARE

## 2021-01-21 VITALS
RESPIRATION RATE: 16 BRPM | TEMPERATURE: 97 F | OXYGEN SATURATION: 93 % | HEART RATE: 86 BPM | HEIGHT: 62 IN | DIASTOLIC BLOOD PRESSURE: 74 MMHG | WEIGHT: 218 LBS | BODY MASS INDEX: 40.12 KG/M2 | SYSTOLIC BLOOD PRESSURE: 128 MMHG

## 2021-01-21 DIAGNOSIS — Z12.31 ENCOUNTER FOR SCREENING MAMMOGRAM FOR MALIGNANT NEOPLASM OF BREAST: Primary | ICD-10-CM

## 2021-01-21 DIAGNOSIS — R06.02 SOB (SHORTNESS OF BREATH): Primary | ICD-10-CM

## 2021-01-21 DIAGNOSIS — Z00.00 ROUTINE GENERAL MEDICAL EXAMINATION AT A HEALTH CARE FACILITY: ICD-10-CM

## 2021-01-21 PROCEDURE — G8484 FLU IMMUNIZE NO ADMIN: HCPCS | Performed by: FAMILY MEDICINE

## 2021-01-21 PROCEDURE — 1123F ACP DISCUSS/DSCN MKR DOCD: CPT | Performed by: FAMILY MEDICINE

## 2021-01-21 PROCEDURE — 4040F PNEUMOC VAC/ADMIN/RCVD: CPT | Performed by: FAMILY MEDICINE

## 2021-01-21 PROCEDURE — G0438 PPPS, INITIAL VISIT: HCPCS | Performed by: FAMILY MEDICINE

## 2021-01-21 RX ORDER — MONTELUKAST SODIUM 10 MG/1
10 TABLET ORAL DAILY
Qty: 30 TABLET | Refills: 3 | Status: SHIPPED
Start: 2021-01-21 | End: 2021-05-18 | Stop reason: SDUPTHER

## 2021-01-21 ASSESSMENT — PATIENT HEALTH QUESTIONNAIRE - PHQ9
2. FEELING DOWN, DEPRESSED OR HOPELESS: 0
SUM OF ALL RESPONSES TO PHQ9 QUESTIONS 1 & 2: 0
SUM OF ALL RESPONSES TO PHQ QUESTIONS 1-9: 0

## 2021-01-21 ASSESSMENT — LIFESTYLE VARIABLES: HOW OFTEN DO YOU HAVE A DRINK CONTAINING ALCOHOL: 0

## 2021-01-21 NOTE — PROGRESS NOTES
Medicare Annual Wellness Visit  Name: Carol Richards Date: 2021   MRN: [id number] Sex: Female   Age: 79 y.o. Ethnicity: Non-/Non    : 1940 Race: Charly Pyle is here for Medicare AWV    Screenings for behavioral, psychosocial and functional/safety risks, and cognitive dysfunction are all negative except as indicated below. These results, as well as other patient data from the [address] form, are documented in Flowsheets linked to this Encounter. Allergies   Allergen Reactions    Asa [Aspirin]      History of nose bleed while on ASA required ER visit and packingof the nose         Prior to Visit Medications    Medication Sig Taking?  Authorizing Provider   Handicap Placard MISC by Does not apply route Patient cannot walk 200 ft without stopping to rest.    Expiration 5 years Yes Navdeep Rankin DO   albuterol sulfate  (90 Base) MCG/ACT inhaler Inhale 2 puffs into the lungs every 6 hours as needed for Wheezing Yes Lesvia Brown DO   Multiple Vitamins-Minerals (CENTRUM PO) Take by mouth Yes Historical Provider, MD   Cholecalciferol (VITAMIN D3 PO) Take 2,000 Units by mouth  Yes Historical Provider, MD   carvedilol (COREG) 3.125 MG tablet Take 1 tablet by mouth 2 times daily (with meals) Instructed to take am of procedure Yes Tato Avila PA-C   rosuvastatin (CRESTOR) 5 MG tablet Take 1 tablet by mouth daily Yes Tato Avila PA-C   mometasone-formoterol (DULERA) 100-5 MCG/ACT inhaler Inhale 2 puffs into the lungs 2 times daily  Navdeep Rankin DO   montelukast (SINGULAIR) 10 MG tablet Take 1 tablet by mouth daily  Lesvia Brown DO         Past Medical History:   Diagnosis Date    Hyperlipidemia     Hypertension        Past Surgical History:   Procedure Laterality Date    BREAST SURGERY Right     lumpectomy    CHOLECYSTECTOMY      COLONOSCOPY  2017    TUBAL LIGATION           Family History   Problem Relation Age of Onset    Heart Attack Father 68        massive       CareTeam (Including outside providers/suppliers regularly involved in providing care):   Patient Care Team:  Vivian Avendaño DO as PCP - General (Family Medicine)  [facility]iv[facility]  Gladys Nam MD as Surgeon (General Surgery)  Caitlin Douglas MD as Consulting Physician (Cardiology)    Wt Readings from Last 3 Encounters:   01/21/21 218 lb (98.9 kg)   10/15/20 211 lb 6.4 oz (95.9 kg)   09/16/20 212 lb (96.2 kg)     Vitals:    01/21/21 1258   BP: 128/74   Pulse: 86   Resp: 16   Temp: 97 °F (36.1 °C)   SpO2: 93%   Weight: 218 lb (98.9 kg)   Height: 5' 2\" (1.575 m)     Body mass index is 39.87 kg/m². Based upon direct observation of the patient, evaluation of cognition reveals recent and remote memory intact.     General Appearance: alert and oriented to person, place and time, well developed and well- nourished, in no acute distress  Skin: warm and dry, no rash or erythema  Head: normocephalic and atraumatic  Eyes: pupils equal, round, and reactive to light, extraocular eye movements intact, conjunctivae normal  ENT: tympanic membrane, external ear and ear canal normal bilaterally, nose without deformity, nasal mucosa and turbinates normal without polyps  Neck: supple and non-tender without mass, no thyromegaly or thyroid nodules, no cervical lymphadenopathy  Pulmonary/Chest: clear to auscultation bilaterally- no wheezes, rales or rhonchi, normal air movement, no respiratory distress  Cardiovascular: normal rate, regular rhythm, normal S1 and S2, no murmurs, rubs, clicks, or gallops, distal pulses intact, no carotid bruits  Abdomen: soft, non-tender, non-distended, normal bowel sounds, no masses or organomegaly  Extremities: no cyanosis, clubbing or edema  Musculoskeletal: normal range of motion, no joint swelling, deformity or tenderness  Neurologic: reflexes normal and symmetric, no cranial nerve deficit, gait, coordination and speech normal    Patient's complete Health Risk Assessment and screening values have been reviewed and are found in Flowsheets. The following problems were reviewed today and where indicated follow up appointments were made and/or referrals ordered. Positive Risk Factor Screenings with Interventions:            General Health and ACP:  General  In general, how would you say your health is?: Good  In the past 7 days, have you experienced any of the following?  New or Increased Pain, New or Increased Fatigue, Loneliness, Social Isolation, Stress or Anger?: None of These  Do you get the social and emotional support that you need?: Yes  Do you have a Living Will?: (!) No  Advance Directives     Power of [address] Will ACP-Advance Directive ACP-Power of     Not on File Not on File Not on File Not on File      General Health Risk Interventions:  · No Living Will: Patient declines ACP discussion/assistance    Health Habits/Nutrition:  Health Habits/Nutrition  Do you exercise for at least 20 minutes 2-3 times per week?: (!) No  Have you lost any weight without trying in the past 3 months?: No  Do you eat fewer than 2 meals per day?: No  Have you seen a dentist within the past year?: Yes  Body mass index: (!) 39.87  Health Habits/Nutrition Interventions:  · Inadequate physical activity:  patient is not ready to increase his/her physical activity level at this time       Personalized Preventive Plan   Current Health Maintenance Status  Immunization History   Administered Date(s) Administered    Influenza Vaccine, unspecified formulation 09/22/2016    Pneumococcal Conjugate 13-valent (Eddie Hoang) 04/03/2019        Health Maintenance   Topic Date Due    Hepatitis C screen  1940    DTaP/Tdap/Td vaccine (1 - Tdap) 03/15/1960    Shingles Vaccine (1 of 2) 03/15/1991    DEXA (modify frequency per FRAX score)  03/15/1996    Annual Wellness Visit (AWV)  05/29/2019    Pneumococcal 65+ years Vaccine (2 of 2 - PPSV23) 04/03/2020    Flu vaccine (1) 09/01/2020    Lipid screen  01/14/2022    Potassium monitoring  01/14/2022    Creatinine monitoring  01/14/2022    Hepatitis A vaccine  Aged Out    Hepatitis B vaccine  Aged Out    Hib vaccine  Aged Out    Meningococcal (ACWY) vaccine  Aged Out     Recommendations for [place] Due: see orders and patient instructions/AVS.  . Recommended screening schedule for the next 5-10 years is provided to the patient in written form: see Patient Kassy Squires was seen today for medicare aw. Diagnoses and all orders for this visit:    Encounter for screening mammogram for malignant neoplasm of breast  -     [facility] DIGITAL SCREEN W OR WO CAD BILATERAL;  Future    Routine general medical examination at a health care facility    Other orders  -     Handicap Placard MISC; by Does not apply route Patient cannot walk 200 ft without stopping to rest.    Expiration 5 years

## 2021-01-21 NOTE — PATIENT INSTRUCTIONS
Personalized Preventive Plan for Robert Lopez - 1/21/2021  Medicare offers a range of preventive health benefits. Some of the tests and screenings are paid in full while other may be subject to a deductible, co-insurance, and/or copay. Some of these benefits include a comprehensive review of your medical history including lifestyle, illnesses that may run in your family, and various assessments and screenings as appropriate. After reviewing your medical record and screening and assessments performed today your provider may have ordered immunizations, labs, imaging, and/or referrals for you. A list of these orders (if applicable) as well as your Preventive Care list are included within your After Visit Summary for your review. Other Preventive Recommendations:    · A preventive eye exam performed by an eye specialist is recommended every 1-2 years to screen for glaucoma; cataracts, macular degeneration, and other eye disorders. · A preventive dental visit is recommended every 6 months. · Try to get at least 150 minutes of exercise per week or 10,000 steps per day on a pedometer . · Order or download the FREE \"Exercise & Physical Activity: Your Everyday Guide\" from [employer]. Call 1-894.938.5241 or search [employer] online. · You need 2198-1792 mg of calcium and 9552-5961 IU of vitamin D per day. It is possible to meet your calcium requirement with diet alone, but a vitamin D supplement is usually necessary to meet this goal.  · When exposed to the sun, use a sunscreen that protects against both UVA and UVB radiation with an SPF of 30 or greater. Reapply every 2 to 3 hours or after sweating, drying off with a towel, or swimming. · Always wear a seat belt when traveling in a car. Always wear a helmet when riding a bicycle or motorcycle.

## 2021-01-27 ENCOUNTER — HOSPITAL ENCOUNTER (OUTPATIENT)
Dept: CT IMAGING | Age: 81
Discharge: HOME | End: 2021-01-29
Payer: MEDICARE

## 2021-01-27 DIAGNOSIS — R06.02 SOB (SHORTNESS OF BREATH): ICD-10-CM

## 2021-01-27 PROCEDURE — 71250 CT THORAX DX C-: CPT

## 2021-01-28 ENCOUNTER — TELEPHONE (OUTPATIENT)
Dept: PRIMARY CARE CLINIC | Age: 81
End: 2021-01-28

## 2021-01-28 NOTE — TELEPHONE ENCOUNTER
The only other option would be to go to [facility].  I am ok with her waiting for now

## 2021-05-18 RX ORDER — MONTELUKAST SODIUM 10 MG/1
10 TABLET ORAL DAILY
Qty: 30 TABLET | Refills: 3 | Status: SHIPPED
Start: 2021-05-18 | End: 2021-08-09 | Stop reason: SDUPTHER

## 2021-07-19 ENCOUNTER — TELEPHONE (OUTPATIENT)
Dept: PRIMARY CARE CLINIC | Age: 81
End: 2021-07-19

## 2021-07-19 DIAGNOSIS — I10 ESSENTIAL HYPERTENSION: ICD-10-CM

## 2021-07-19 RX ORDER — CARVEDILOL 3.12 MG/1
3.12 TABLET ORAL 2 TIMES DAILY WITH MEALS
Qty: 180 TABLET | Refills: 3 | Status: SHIPPED
Start: 2021-07-19 | End: 2021-08-09 | Stop reason: SDUPTHER

## 2021-08-09 DIAGNOSIS — I10 ESSENTIAL HYPERTENSION: ICD-10-CM

## 2021-08-09 RX ORDER — CARVEDILOL 3.12 MG/1
3.12 TABLET ORAL 2 TIMES DAILY WITH MEALS
Qty: 180 TABLET | Refills: 3 | Status: SHIPPED
Start: 2021-08-09 | End: 2021-09-29 | Stop reason: SDUPTHER

## 2021-08-09 RX ORDER — MONTELUKAST SODIUM 10 MG/1
10 TABLET ORAL DAILY
Qty: 30 TABLET | Refills: 3 | Status: SHIPPED
Start: 2021-08-09 | End: 2021-12-21

## 2021-08-09 NOTE — TELEPHONE ENCOUNTER
----- Message from Michel Gordon sent at 8/9/2021  9:16 AM EDT -----  Subject: Refill Request    QUESTIONS  Name of Medication? montelukast (SINGULAIR) 10 MG tablet  Patient-reported dosage and instructions? 1 tab daily  How many days do you have left? 0  Preferred Pharmacy? [address] phone number (if available)? [phone]  ---------------------------------------------------------------------------  --------------,  Name of Medication? carvedilol (COREG) 3.125 MG tablet  Patient-reported dosage and instructions? 2 tab daily  How many days do you have left? 0  Preferred Pharmacy? [address] phone number (if available)? [phone]  ---------------------------------------------------------------------------  --------------,  Name of Medication? carvedilol (COREG) 3.125 MG tablet  Patient-reported dosage and instructions? 1 daily  How many days do you have left? 0  Preferred Pharmacy? [address] phone number (if available)? [phone]  ---------------------------------------------------------------------------  --------------  CALL BACK INFO  What is the best way for the office to contact you? OK to leave message on   voicemail  Preferred Call Back Phone Number?  [phone]

## 2021-08-16 ENCOUNTER — OFFICE VISIT (OUTPATIENT)
Dept: PRIMARY CARE CLINIC | Age: 81
End: 2021-08-16
Payer: MEDICARE

## 2021-08-16 VITALS
OXYGEN SATURATION: 94 % | SYSTOLIC BLOOD PRESSURE: 128 MMHG | DIASTOLIC BLOOD PRESSURE: 82 MMHG | HEART RATE: 87 BPM | WEIGHT: 220 LBS | BODY MASS INDEX: 40.24 KG/M2 | TEMPERATURE: 97.4 F

## 2021-08-16 DIAGNOSIS — E78.2 MIXED HYPERLIPIDEMIA: ICD-10-CM

## 2021-08-16 DIAGNOSIS — I10 ESSENTIAL HYPERTENSION: Primary | ICD-10-CM

## 2021-08-16 DIAGNOSIS — R53.83 FATIGUE, UNSPECIFIED TYPE: ICD-10-CM

## 2021-08-16 PROCEDURE — 1036F TOBACCO NON-USER: CPT | Performed by: FAMILY MEDICINE

## 2021-08-16 PROCEDURE — G8400 PT W/DXA NO RESULTS DOC: HCPCS | Performed by: FAMILY MEDICINE

## 2021-08-16 PROCEDURE — 90732 PPSV23 VACC 2 YRS+ SUBQ/IM: CPT | Performed by: FAMILY MEDICINE

## 2021-08-16 PROCEDURE — G8417 CALC BMI ABV UP PARAM F/U: HCPCS | Performed by: FAMILY MEDICINE

## 2021-08-16 PROCEDURE — 1123F ACP DISCUSS/DSCN MKR DOCD: CPT | Performed by: FAMILY MEDICINE

## 2021-08-16 PROCEDURE — 4040F PNEUMOC VAC/ADMIN/RCVD: CPT | Performed by: FAMILY MEDICINE

## 2021-08-16 PROCEDURE — G0009 ADMIN PNEUMOCOCCAL VACCINE: HCPCS | Performed by: FAMILY MEDICINE

## 2021-08-16 PROCEDURE — G8427 DOCREV CUR MEDS BY ELIG CLIN: HCPCS | Performed by: FAMILY MEDICINE

## 2021-08-16 PROCEDURE — 1090F PRES/ABSN URINE INCON ASSESS: CPT | Performed by: FAMILY MEDICINE

## 2021-08-16 PROCEDURE — 99214 OFFICE O/P EST MOD 30 MIN: CPT | Performed by: FAMILY MEDICINE

## 2021-08-16 ASSESSMENT — ENCOUNTER SYMPTOMS
DIARRHEA: 0
NAUSEA: 0
VOMITING: 0
EYE ITCHING: 0
BACK PAIN: 0
SORE THROAT: 0
CONSTIPATION: 0
VISUAL CHANGE: 0
EYE PAIN: 0
CHEST TIGHTNESS: 0
COUGH: 0
RHINORRHEA: 0
WHEEZING: 0
BLOOD IN STOOL: 0
APNEA: 0
EYE REDNESS: 0
COLOR CHANGE: 0
SINUS PRESSURE: 0
ABDOMINAL PAIN: 0
SHORTNESS OF BREATH: 1

## 2021-08-16 NOTE — PROGRESS NOTES
Chief Complaint:     Chief Complaint   Patient presents with    Other     6 month follow up     Hypertension    Hyperlipidemia    Fatigue         Hypertension  This is a chronic problem. The current episode started more than 1 year ago. The problem is unchanged. The problem is controlled. Associated symptoms include shortness of breath. Pertinent negatives include no chest pain, headaches, neck pain or palpitations. There are no known risk factors for coronary artery disease. Past treatments include beta blockers. The current treatment provides significant improvement. There are no compliance problems. There is no history of CAD/MI, CVA, heart failure, left ventricular hypertrophy or PVD. There is no history of a hypertension causing med, pheochromocytoma, renovascular disease, sleep apnea or a thyroid problem. Hyperlipidemia  This is a chronic problem. The current episode started more than 1 year ago. Exacerbating diseases include obesity. Associated symptoms include shortness of breath. Pertinent negatives include no chest pain or myalgias. Current antihyperlipidemic treatment includes statins. There are no compliance problems. Risk factors for coronary artery disease include dyslipidemia, hypertension, obesity and post-menopausal.   Fatigue  This is a recurrent problem. The current episode started more than 1 month ago. The problem occurs daily. The problem has been waxing and waning. Associated symptoms include fatigue. Pertinent negatives include no abdominal pain, arthralgias, chest pain, chills, congestion, coughing, diaphoresis, fever, headaches, myalgias, nausea, neck pain, numbness, rash, sore throat, vertigo, visual change, vomiting or weakness. Nothing aggravates the symptoms. She has tried nothing for the symptoms. The treatment provided no relief. Shortness of Breath  This is a recurrent problem. The current episode started more than 1 month ago. The problem has been waxing and waning. Pertinent negatives include no abdominal pain, chest pain, coryza, ear pain, fever, headaches, leg swelling, neck pain, rash, rhinorrhea, sore throat, vomiting or wheezing. Nothing aggravates the symptoms. The patient has no known risk factors for DVT/PE. She has tried nothing for the symptoms. The treatment provided no relief. There is no history of a heart failure. Patient Active Problem List   Diagnosis    Essential hypertension    Mixed hyperlipidemia    Fatigue       Past Medical History:   Diagnosis Date    Hyperlipidemia     Hypertension        Past Surgical History:   Procedure Laterality Date    BREAST SURGERY Right     lumpectomy    CHOLECYSTECTOMY      COLONOSCOPY  02/20/2017    TUBAL LIGATION         Current Outpatient Medications   Medication Sig Dispense Refill    montelukast (SINGULAIR) 10 MG tablet Take 1 tablet by mouth daily 30 tablet 3    carvedilol (COREG) 3.125 MG tablet Take 1 tablet by mouth 2 times daily (with meals) 180 tablet 3    DULERA 100-5 MCG/ACT inhaler INHALE 2 PUFFS INTO THE LUNGS TWO TIMES A DAY 13 g 1    ipratropium (ATROVENT) 0.03 % nasal spray 2 sprays by Each Nostril route daily 1 Bottle 3    Handicap Placard MISC by Does not apply route Patient cannot walk 200 ft without stopping to rest.    Expiration 5 years 1 each 0    albuterol sulfate  (90 Base) MCG/ACT inhaler Inhale 2 puffs into the lungs every 6 hours as needed for Wheezing 1 Inhaler 3    Multiple Vitamins-Minerals (CENTRUM PO) Take by mouth      Cholecalciferol (VITAMIN D3 PO) Take 2,000 Units by mouth       rosuvastatin (CRESTOR) 5 MG tablet Take 1 tablet by mouth daily 90 tablet 3     No current facility-administered medications for this visit.        Allergies   Allergen Reactions    Asa [Aspirin]      History of nose bleed while on ASA required ER visit and packingof the nose       Social History     Socioeconomic History    Marital status:      Spouse name: None    Number of children: None    Years of education: None    Highest education level: None   Occupational History    Occupation: [employer] Use    Smoking status: Never Smoker    Smokeless tobacco: Never Used   Vaping Use    Vaping Use: Never used   Substance and Sexual Activity    Alcohol use: No    Drug use: No    Sexual activity: Not Currently   Other Topics Concern    None   Social History Narrative    None     Social Determinants of Health     Financial Resource Strain:     Difficulty of Paying Living Expenses:    Food Insecurity:     Worried About Running Out of Food in the Last Year:     Ran Out of Food in the Last Year:    Transportation Needs:     Lack of Transportation (Medical):  Lack of Transportation (Non-Medical):    Physical Activity:     Days of Exercise per Week:     Minutes of Exercise per Session:    Stress:     Feeling of Stress :    Social Connections:     Frequency of Communication with Friends and Family:     Frequency of Social Gatherings with Friends and Family:     Attends Rastafari Services:     Active Member of Clubs or Organizations:     Attends Club or Organization Meetings:     Marital Status:    Intimate Partner Violence:     Fear of Current or Ex-Partner:     Emotionally Abused:     Physically Abused:     Sexually Abused:        Family History   Problem Relation Age of Onset    Heart Attack Father 68        massive          Review of Systems   Constitutional: Positive for fatigue. Negative for activity change, appetite change, chills, diaphoresis and fever. HENT: Negative for congestion, ear pain, hearing loss, nosebleeds, rhinorrhea, sinus pressure and sore throat. Eyes: Negative for pain, redness, itching and visual disturbance. Respiratory: Positive for shortness of breath. Negative for apnea, cough, chest tightness and wheezing. Cardiovascular: Negative for chest pain, palpitations and leg swelling. Gastrointestinal: Negative for abdominal pain, blood in stool, constipation, diarrhea, nausea and vomiting. Endocrine: Negative. Genitourinary: Negative for decreased urine volume, difficulty urinating, dysuria, frequency, hematuria and urgency. Musculoskeletal: Negative for arthralgias, back pain, gait problem, myalgias and neck pain. Skin: Negative for color change and rash. Allergic/Immunologic: Negative for environmental allergies and food allergies. Neurological: Negative for dizziness, vertigo, weakness, light-headedness, numbness and headaches. Hematological: Negative for adenopathy. Does not bruise/bleed easily. Psychiatric/Behavioral: Negative for behavioral problems, dysphoric mood and sleep disturbance. The patient is not nervous/anxious and is not hyperactive. All other systems reviewed and are negative. /82   Pulse 87   Temp 97.4 °F (36.3 °C)   Wt 220 lb (99.8 kg)   SpO2 94%   BMI 40.24 kg/m²     Physical Exam  Vitals and nursing note reviewed. Constitutional:       General: She is not in acute distress. Appearance: Normal appearance. She is well-developed. HENT:      Head: Normocephalic and atraumatic. Right Ear: Hearing, tympanic membrane and external ear normal. No tenderness. No middle ear effusion. Left Ear: Hearing, tympanic membrane and external ear normal. No tenderness. No middle ear effusion. Nose: Nose normal. No congestion or rhinorrhea. Right Turbinates: Not enlarged. Left Turbinates: Not enlarged. Mouth/Throat:      Mouth: Mucous membranes are moist.      Tongue: No lesions. Pharynx: Oropharynx is clear. No oropharyngeal exudate or posterior oropharyngeal erythema. Eyes:      General: No scleral icterus. Conjunctiva/sclera: Conjunctivae normal.      Pupils: Pupils are equal, round, and reactive to light. Neck:      Thyroid: No thyromegaly.    Cardiovascular:      Rate and Rhythm: Normal rate and regular rhythm. Heart sounds: Normal heart sounds. No murmur heard. Pulmonary:      Effort: Pulmonary effort is normal. No respiratory distress. Breath sounds: Normal breath sounds. No wheezing or rales. Abdominal:      General: Bowel sounds are normal. There is no distension. Palpations: Abdomen is soft. Tenderness: There is no abdominal tenderness. Musculoskeletal:         General: No tenderness. Normal range of motion. Cervical back: Normal range of motion and neck supple. No rigidity. No muscular tenderness. Lymphadenopathy:      Cervical: No cervical adenopathy. Skin:     General: Skin is warm and dry. Findings: No erythema or rash. Neurological:      General: No focal deficit present. Mental Status: She is alert and oriented to person, place, and time. Cranial Nerves: No cranial nerve deficit. Deep Tendon Reflexes: Reflexes are normal and symmetric. Reflexes normal.   Psychiatric:         Mood and Affect: Mood normal.                                 ASSESSMENT/PLAN:    Patient Active Problem List   Diagnosis    Essential hypertension    Mixed hyperlipidemia    Fatigue       Yris Mantilla was seen today for other, hypertension, hyperlipidemia and fatigue. Diagnoses and all orders for this visit:    Essential hypertension  -     Comprehensive Metabolic Panel; Future  -     Lipid Panel; Future  -     TSH without Reflex; Future    Mixed hyperlipidemia  -     Comprehensive Metabolic Panel; Future  -     Lipid Panel; Future  -     TSH without Reflex; Future    Fatigue, unspecified type    Other orders  -     Pneumococcal polysaccharide vaccine 23-valent greater than or equal to 3yo subcutaneous/IM          Return in about 6 months (around 2/16/2022) for Medicare AWV. I spent 20 minutes with this patient. I spent greater than 50% of the time counseling this patient.         Blair Ventura DO  8/16/2021  3:48 PM

## 2021-08-17 DIAGNOSIS — E78.2 MIXED HYPERLIPIDEMIA: ICD-10-CM

## 2021-08-17 RX ORDER — ROSUVASTATIN CALCIUM 5 MG/1
5 TABLET, COATED ORAL DAILY
Qty: 90 TABLET | Refills: 3 | Status: SHIPPED
Start: 2021-08-17 | End: 2022-09-01

## 2021-09-16 ENCOUNTER — TELEPHONE (OUTPATIENT)
Dept: PRIMARY CARE CLINIC | Age: 81
End: 2021-09-16

## 2021-09-16 NOTE — TELEPHONE ENCOUNTER
----- Message from Neha Ohara sent at 9/16/2021  9:09 AM EDT -----  Subject: Message to Provider    QUESTIONS  Information for Provider? PT is shaky and has nausea but thinks it from a   new medication. Has made apt for 9/23 at 11 but wanted to see if she could   get in sooner. Please contact PT back with any help. Thank you.   ---------------------------------------------------------------------------  --------------  CALL BACK INFO  What is the best way for the office to contact you? OK to leave message on   voicemail  Preferred Call Back Phone Number? [phone]  ---------------------------------------------------------------------------  --------------  SCRIPT ANSWERS  Relationship to Patient?  Self

## 2021-09-17 ENCOUNTER — OFFICE VISIT (OUTPATIENT)
Dept: PRIMARY CARE CLINIC | Age: 81
End: 2021-09-17
Payer: MEDICARE

## 2021-09-17 VITALS
WEIGHT: 216 LBS | TEMPERATURE: 97.3 F | HEIGHT: 61 IN | RESPIRATION RATE: 16 BRPM | HEART RATE: 82 BPM | SYSTOLIC BLOOD PRESSURE: 130 MMHG | BODY MASS INDEX: 40.78 KG/M2 | OXYGEN SATURATION: 98 % | DIASTOLIC BLOOD PRESSURE: 80 MMHG

## 2021-09-17 DIAGNOSIS — J06.9 VIRAL UPPER RESPIRATORY TRACT INFECTION: Primary | ICD-10-CM

## 2021-09-17 PROCEDURE — 1123F ACP DISCUSS/DSCN MKR DOCD: CPT | Performed by: FAMILY MEDICINE

## 2021-09-17 PROCEDURE — G8400 PT W/DXA NO RESULTS DOC: HCPCS | Performed by: FAMILY MEDICINE

## 2021-09-17 PROCEDURE — G8417 CALC BMI ABV UP PARAM F/U: HCPCS | Performed by: FAMILY MEDICINE

## 2021-09-17 PROCEDURE — 1036F TOBACCO NON-USER: CPT | Performed by: FAMILY MEDICINE

## 2021-09-17 PROCEDURE — 99213 OFFICE O/P EST LOW 20 MIN: CPT | Performed by: FAMILY MEDICINE

## 2021-09-17 PROCEDURE — 4040F PNEUMOC VAC/ADMIN/RCVD: CPT | Performed by: FAMILY MEDICINE

## 2021-09-17 PROCEDURE — 1090F PRES/ABSN URINE INCON ASSESS: CPT | Performed by: FAMILY MEDICINE

## 2021-09-17 PROCEDURE — G8427 DOCREV CUR MEDS BY ELIG CLIN: HCPCS | Performed by: FAMILY MEDICINE

## 2021-09-17 RX ORDER — ONDANSETRON 4 MG/1
4 TABLET, ORALLY DISINTEGRATING ORAL EVERY 8 HOURS PRN
Qty: 20 TABLET | Refills: 0 | Status: SHIPPED
Start: 2021-09-17 | End: 2022-09-08 | Stop reason: ALTCHOICE

## 2021-09-17 ASSESSMENT — ENCOUNTER SYMPTOMS
RHINORRHEA: 1
VISUAL CHANGE: 0
COUGH: 1
DIARRHEA: 0
SINUS PAIN: 1
NAUSEA: 1
VOMITING: 0
WHEEZING: 0
SHORTNESS OF BREATH: 0
SINUS PRESSURE: 1
EYE REDNESS: 0
CHANGE IN BOWEL HABIT: 1
SORE THROAT: 1

## 2021-09-17 NOTE — PROGRESS NOTES
daily 30 tablet 3    carvedilol (COREG) 3.125 MG tablet Take 1 tablet by mouth 2 times daily (with meals) 180 tablet 3    ipratropium (ATROVENT) 0.03 % nasal spray 2 sprays by Each Nostril route daily 1 Bottle 3    albuterol sulfate  (90 Base) MCG/ACT inhaler Inhale 2 puffs into the lungs every 6 hours as needed for Wheezing 1 Inhaler 3    Multiple Vitamins-Minerals (CENTRUM PO) Take by mouth      Cholecalciferol (VITAMIN D3 PO) Take 2,000 Units by mouth        No current facility-administered medications for this visit. Allergies   Allergen Reactions    Asa [Aspirin]      History of nose bleed while on ASA required ER visit and packingof the nose       Social History     Socioeconomic History    Marital status:      Spouse name: None    Number of children: None    Years of education: None    Highest education level: None   Occupational History    Occupation: retired- [employer] [address] Use    Smoking status: Never Smoker    Smokeless tobacco: Never Used   Vaping Use    Vaping Use: Never used   Substance and Sexual Activity    Alcohol use: No    Drug use: No    Sexual activity: Not Currently   Other Topics Concern    None   Social History Narrative    None     Social Determinants of Health     Financial Resource Strain:     Difficulty of Paying Living Expenses:    Food Insecurity:     Worried About Running Out of Food in the Last Year:     Ran Out of Food in the Last Year:    Transportation Needs:     Lack of Transportation (Medical):      Lack of Transportation (Non-Medical):    Physical Activity:     Days of Exercise per Week:     Minutes of Exercise per Session:    Stress:     Feeling of Stress :    Social Connections:     Frequency of Communication with Friends and Family:     Frequency of Social Gatherings with Friends and Family:     Attends Pentecostal Services:     Active Member of Clubs or Organizations:     Attends [employer] or Organization Meetings:     Marital Status:    Intimate Partner Violence:     Fear of Current or Ex-Partner:     Emotionally Abused:     Physically Abused:     Sexually Abused:        Family History   Problem Relation Age of Onset    Heart Attack Father 68        massive          Review of Systems   Constitutional: Positive for fatigue. Negative for chills and fever. HENT: Positive for congestion, ear pain, postnasal drip, rhinorrhea, sinus pressure, sinus pain and sore throat. Negative for ear discharge. Eyes: Negative for redness. Respiratory: Positive for cough. Negative for shortness of breath and wheezing. Gastrointestinal: Positive for change in bowel habit and nausea. Negative for diarrhea and vomiting. Skin: Negative for rash. Neurological: Negative for vertigo and weakness. All other systems reviewed and are negative. /80   Pulse 82   Temp 97.3 °F (36.3 °C)   Resp 16   Ht 5' 1\" (1.549 m)   Wt 216 lb (98 kg)   SpO2 98%   BMI 40.81 kg/m²     Physical Exam  Vitals and nursing note reviewed. Constitutional:       General: She is not in acute distress. Appearance: Normal appearance. She is well-developed. HENT:      Head: Normocephalic and atraumatic. Right Ear: Hearing, tympanic membrane and external ear normal. No tenderness. No middle ear effusion. Left Ear: Hearing, tympanic membrane and external ear normal. No tenderness. No middle ear effusion. Nose: Nose normal. No congestion or rhinorrhea. Right Turbinates: Not enlarged. Left Turbinates: Not enlarged. Mouth/Throat:      Mouth: Mucous membranes are moist.      Tongue: No lesions. Pharynx: Oropharynx is clear. No oropharyngeal exudate or posterior oropharyngeal erythema. Eyes:      General: No scleral icterus. Conjunctiva/sclera: Conjunctivae normal.      Pupils: Pupils are equal, round, and reactive to light. Neck:      Thyroid: No thyromegaly. Cardiovascular:      Rate and Rhythm: Normal rate and regular rhythm. Heart sounds: Normal heart sounds. No murmur heard. Pulmonary:      Effort: Pulmonary effort is normal. No respiratory distress. Breath sounds: Normal breath sounds. No wheezing or rales. Abdominal:      General: Bowel sounds are normal. There is no distension. Palpations: Abdomen is soft. Tenderness: There is no abdominal tenderness. Musculoskeletal:         General: No tenderness. Normal range of motion. Cervical back: Normal range of motion and neck supple. No rigidity. No muscular tenderness. Lymphadenopathy:      Cervical: No cervical adenopathy. Skin:     General: Skin is warm and dry. Findings: No erythema or rash. Neurological:      General: No focal deficit present. Mental Status: She is alert and oriented to person, place, and time. Cranial Nerves: No cranial nerve deficit. Deep Tendon Reflexes: Reflexes are normal and symmetric. Reflexes normal.   Psychiatric:         Mood and Affect: Mood normal.                                 ASSESSMENT/PLAN:    Patient Active Problem List   Diagnosis    Essential hypertension    Mixed hyperlipidemia    Fatigue       Derrick Khan was seen today for nausea and other. Diagnoses and all orders for this visit:    Viral upper respiratory tract infection  -     Covid-19 Ambulatory; Future    Other orders  -     ondansetron (ZOFRAN ODT) 4 MG disintegrating tablet; Take 1 tablet by mouth every 8 hours as needed for Nausea or Vomiting          Return if symptoms worsen or fail to improve. I spent 20 minutes with this patient. I spent greater than 50% of the time counseling this patient.         Reymundo Rubin DO  9/17/2021  9:15 AM

## 2021-09-23 ENCOUNTER — TELEPHONE (OUTPATIENT)
Dept: PRIMARY CARE CLINIC | Age: 81
End: 2021-09-23

## 2021-09-23 NOTE — TELEPHONE ENCOUNTER
----- Message from Tami Bellamy sent at 9/22/2021  4:50 PM EDT -----  Subject: Results Request    QUESTIONS  Which lab or imaging result is the patient calling about? Covid  Which provider ordered the test? Kya Torres   At what location was the test performed? Date the test was performed? 2021-09-17  Additional Information for Provider? Pt states she never got a call on   9/21 or 9/22 and notes advise. Requesting call back ASAP.   ---------------------------------------------------------------------------  --------------  CALL BACK INFO  What is the best way for the office to contact you? Do not leave any   message, patient will call back for answer  Preferred Call Back Phone Number?  [phone]

## 2021-09-29 DIAGNOSIS — I10 ESSENTIAL HYPERTENSION: ICD-10-CM

## 2021-09-29 RX ORDER — CARVEDILOL 3.12 MG/1
3.12 TABLET ORAL 2 TIMES DAILY WITH MEALS
Qty: 180 TABLET | Refills: 1 | Status: SHIPPED
Start: 2021-09-29 | End: 2022-03-28

## 2021-10-26 ENCOUNTER — OFFICE VISIT (OUTPATIENT)
Dept: PRIMARY CARE CLINIC | Age: 81
End: 2021-10-26
Payer: MEDICARE

## 2021-10-26 VITALS
RESPIRATION RATE: 16 BRPM | OXYGEN SATURATION: 96 % | DIASTOLIC BLOOD PRESSURE: 72 MMHG | SYSTOLIC BLOOD PRESSURE: 130 MMHG | BODY MASS INDEX: 41.54 KG/M2 | HEART RATE: 75 BPM | WEIGHT: 220 LBS | HEIGHT: 61 IN

## 2021-10-26 DIAGNOSIS — Z86.16 HISTORY OF 2019 NOVEL CORONAVIRUS DISEASE (COVID-19): ICD-10-CM

## 2021-10-26 DIAGNOSIS — E78.2 MIXED HYPERLIPIDEMIA: ICD-10-CM

## 2021-10-26 DIAGNOSIS — I10 ESSENTIAL HYPERTENSION: ICD-10-CM

## 2021-10-26 DIAGNOSIS — J44.9 CHRONIC OBSTRUCTIVE PULMONARY DISEASE, UNSPECIFIED COPD TYPE (HCC): ICD-10-CM

## 2021-10-26 DIAGNOSIS — I10 ESSENTIAL HYPERTENSION: Primary | ICD-10-CM

## 2021-10-26 LAB
BACTERIA: ABNORMAL /HPF
BILIRUBIN URINE: NEGATIVE
BLOOD, URINE: NEGATIVE
CLARITY: CLEAR
COLOR: YELLOW
GLUCOSE URINE: NEGATIVE MG/DL
KETONES, URINE: NEGATIVE MG/DL
LEUKOCYTE ESTERASE, URINE: ABNORMAL
NITRITE, URINE: NEGATIVE
PH UA: 5.5 (ref 5–9)
PROTEIN UA: NEGATIVE MG/DL
RBC UA: ABNORMAL /HPF (ref 0–2)
SPECIFIC GRAVITY UA: >=1.03 (ref 1–1.03)
UROBILINOGEN, URINE: 0.2 E.U./DL
WBC UA: ABNORMAL /HPF (ref 0–5)

## 2021-10-26 PROCEDURE — G8400 PT W/DXA NO RESULTS DOC: HCPCS | Performed by: FAMILY MEDICINE

## 2021-10-26 PROCEDURE — 1123F ACP DISCUSS/DSCN MKR DOCD: CPT | Performed by: FAMILY MEDICINE

## 2021-10-26 PROCEDURE — G8417 CALC BMI ABV UP PARAM F/U: HCPCS | Performed by: FAMILY MEDICINE

## 2021-10-26 PROCEDURE — G8484 FLU IMMUNIZE NO ADMIN: HCPCS | Performed by: FAMILY MEDICINE

## 2021-10-26 PROCEDURE — 4040F PNEUMOC VAC/ADMIN/RCVD: CPT | Performed by: FAMILY MEDICINE

## 2021-10-26 PROCEDURE — 1036F TOBACCO NON-USER: CPT | Performed by: FAMILY MEDICINE

## 2021-10-26 PROCEDURE — 1090F PRES/ABSN URINE INCON ASSESS: CPT | Performed by: FAMILY MEDICINE

## 2021-10-26 PROCEDURE — 3023F SPIROM DOC REV: CPT | Performed by: FAMILY MEDICINE

## 2021-10-26 PROCEDURE — G8926 SPIRO NO PERF OR DOC: HCPCS | Performed by: FAMILY MEDICINE

## 2021-10-26 PROCEDURE — 99214 OFFICE O/P EST MOD 30 MIN: CPT | Performed by: FAMILY MEDICINE

## 2021-10-26 PROCEDURE — G8427 DOCREV CUR MEDS BY ELIG CLIN: HCPCS | Performed by: FAMILY MEDICINE

## 2021-10-26 SDOH — ECONOMIC STABILITY: FOOD INSECURITY: WITHIN THE PAST 12 MONTHS, YOU WORRIED THAT YOUR FOOD WOULD RUN OUT BEFORE YOU GOT MONEY TO BUY MORE.: NEVER TRUE

## 2021-10-26 SDOH — ECONOMIC STABILITY: FOOD INSECURITY: WITHIN THE PAST 12 MONTHS, THE FOOD YOU BOUGHT JUST DIDN'T LAST AND YOU DIDN'T HAVE MONEY TO GET MORE.: NEVER TRUE

## 2021-10-26 ASSESSMENT — ENCOUNTER SYMPTOMS
BLOOD IN STOOL: 0
DIARRHEA: 0
WHEEZING: 0
CHEST TIGHTNESS: 0
NAUSEA: 0
COLOR CHANGE: 0
VOMITING: 0
COUGH: 0
RHINORRHEA: 0
VISUAL CHANGE: 0
CONSTIPATION: 0
APNEA: 0
BACK PAIN: 0
EYE ITCHING: 0
ABDOMINAL PAIN: 0
SPUTUM PRODUCTION: 0
EYE REDNESS: 0
SHORTNESS OF BREATH: 0
EYE PAIN: 0
SINUS PRESSURE: 0
DIFFICULTY BREATHING: 0
SORE THROAT: 0

## 2021-10-26 ASSESSMENT — COPD QUESTIONNAIRES: COPD: 1

## 2021-10-26 ASSESSMENT — SOCIAL DETERMINANTS OF HEALTH (SDOH): HOW HARD IS IT FOR YOU TO PAY FOR THE VERY BASICS LIKE FOOD, HOUSING, MEDICAL CARE, AND HEATING?: NOT HARD AT ALL

## 2021-10-26 NOTE — PROGRESS NOTES
Chief Complaint:     Chief Complaint   Patient presents with    Hyperlipidemia    Hypertension         Hyperlipidemia  This is a chronic problem. The current episode started more than 1 year ago. Exacerbating diseases include obesity. Pertinent negatives include no chest pain, myalgias or shortness of breath. Current antihyperlipidemic treatment includes statins. There are no compliance problems. Risk factors for coronary artery disease include dyslipidemia, hypertension, obesity and post-menopausal.   Hypertension  This is a chronic problem. The current episode started more than 1 year ago. The problem is unchanged. The problem is controlled. Pertinent negatives include no chest pain, headaches, neck pain, palpitations or shortness of breath. There are no known risk factors for coronary artery disease. Past treatments include beta blockers. The current treatment provides significant improvement. There are no compliance problems. There is no history of CAD/MI, CVA, heart failure, left ventricular hypertrophy or PVD. There is no history of a hypertension causing med, pheochromocytoma, renovascular disease, sleep apnea or a thyroid problem. COPD  There is no cough, difficulty breathing, shortness of breath, sputum production or wheezing. This is a new problem. The current episode started more than 1 month ago. The problem occurs daily. The problem has been gradually improving. Pertinent negatives include no appetite change, chest pain, ear pain, fever, headaches, myalgias, rhinorrhea or sore throat. Her symptoms are aggravated by nothing. Her symptoms are alleviated by beta-agonist, steroid inhaler and ipratropium. She reports significant improvement on treatment. Risk factors for lung disease include occupational exposure and smoking/tobacco exposure. Her past medical history is significant for COPD.        Patient Active Problem List   Diagnosis    Essential hypertension    Mixed hyperlipidemia    Fatigue Past Medical History:   Diagnosis Date    Hyperlipidemia     Hypertension        Past Surgical History:   Procedure Laterality Date    BREAST SURGERY Right     lumpectomy    CHOLECYSTECTOMY      COLONOSCOPY  02/20/2017    TUBAL LIGATION         Current Outpatient Medications   Medication Sig Dispense Refill    carvedilol (COREG) 3.125 MG tablet Take 1 tablet by mouth 2 times daily (with meals) 180 tablet 1    ondansetron (ZOFRAN ODT) 4 MG disintegrating tablet Take 1 tablet by mouth every 8 hours as needed for Nausea or Vomiting 20 tablet 0    Biotin 10 MG tablet Take 10 mg by mouth daily      fluticasone-umeclidin-vilant (TRELEGY ELLIPTA) 100-62.5-25 MCG/INH AEPB Inhale 1 puff into the lungs daily 60 each 12    mometasone-formoterol (DULERA) 100-5 MCG/ACT inhaler Inhale 2 puffs into the lungs 2 times daily 13 g 5    rosuvastatin (CRESTOR) 5 MG tablet Take 1 tablet by mouth daily 90 tablet 3    montelukast (SINGULAIR) 10 MG tablet Take 1 tablet by mouth daily 30 tablet 3    ipratropium (ATROVENT) 0.03 % nasal spray 2 sprays by Each Nostril route daily 1 Bottle 3    albuterol sulfate  (90 Base) MCG/ACT inhaler Inhale 2 puffs into the lungs every 6 hours as needed for Wheezing 1 Inhaler 3    Multiple Vitamins-Minerals (CENTRUM PO) Take by mouth      Cholecalciferol (VITAMIN D3 PO) Take 2,000 Units by mouth        No current facility-administered medications for this visit.        Allergies   Allergen Reactions    Asa [Aspirin]      History of nose bleed while on ASA required ER visit and packingof the nose       Social History     Socioeconomic History    Marital status:      Spouse name: None    Number of children: None    Years of education: None    Highest education level: None   Occupational History    Occupation: retired- [employer] [address] Use    Smoking status: Never Smoker    Smokeless tobacco: Never Used   Vaping Use    Vaping Use: Never used   Substance and Sexual Activity    Alcohol use: No    Drug use: No    Sexual activity: Not Currently   Other Topics Concern    None   Social History Narrative    None     Social Determinants of Health     Financial Resource Strain: Low Risk     Difficulty of Paying Living Expenses: Not hard at all   Food Insecurity: No Food Insecurity    Worried About Running Out of Food in the Last Year: Never true    Christina of Food in the Last Year: Never true   Transportation Needs:     Lack of Transportation (Medical):  Lack of Transportation (Non-Medical):    Physical Activity:     Days of Exercise per Week:     Minutes of Exercise per Session:    Stress:     Feeling of Stress :    Social Connections:     Frequency of Communication with Friends and Family:     Frequency of Social Gatherings with Friends and Family:     Attends Yarsanism Services:     Active Member of Clubs or Organizations:     Attends Club or Organization Meetings:     Marital Status:    Intimate Partner Violence:     Fear of Current or Ex-Partner:     Emotionally Abused:     Physically Abused:     Sexually Abused:        Family History   Problem Relation Age of Onset    Heart Attack Father 68        massive          Review of Systems   Constitutional: Positive for fatigue. Negative for activity change, appetite change, chills, diaphoresis and fever. HENT: Negative for congestion, ear pain, hearing loss, nosebleeds, rhinorrhea, sinus pressure and sore throat. Eyes: Negative for pain, redness, itching and visual disturbance. Respiratory: Negative for apnea, cough, sputum production, chest tightness, shortness of breath and wheezing. Cardiovascular: Negative for chest pain, palpitations and leg swelling. Gastrointestinal: Negative for abdominal pain, blood in stool, constipation, diarrhea, nausea and vomiting. Endocrine: Negative.     Genitourinary: Negative for decreased urine volume, difficulty urinating, dysuria, frequency, hematuria and urgency. Musculoskeletal: Negative for arthralgias, back pain, gait problem, myalgias and neck pain. Skin: Negative for color change and rash. Allergic/Immunologic: Negative for environmental allergies and food allergies. Neurological: Negative for dizziness, vertigo, weakness, light-headedness, numbness and headaches. Hematological: Negative for adenopathy. Does not bruise/bleed easily. Psychiatric/Behavioral: Negative for behavioral problems, dysphoric mood and sleep disturbance. The patient is not nervous/anxious and is not hyperactive. All other systems reviewed and are negative. /72   Pulse 75   Resp 16   Ht 5' 1\" (1.549 m)   Wt 220 lb (99.8 kg)   SpO2 96%   BMI 41.57 kg/m²     Physical Exam  Vitals and nursing note reviewed. Constitutional:       General: She is not in acute distress. Appearance: Normal appearance. She is well-developed. HENT:      Head: Normocephalic and atraumatic. Right Ear: Hearing, tympanic membrane and external ear normal. No tenderness. No middle ear effusion. Left Ear: Hearing, tympanic membrane and external ear normal. No tenderness. No middle ear effusion. Nose: Nose normal. No congestion or rhinorrhea. Right Turbinates: Not enlarged. Left Turbinates: Not enlarged. Mouth/Throat:      Mouth: Mucous membranes are moist.      Tongue: No lesions. Pharynx: Oropharynx is clear. No oropharyngeal exudate or posterior oropharyngeal erythema. Eyes:      General: No scleral icterus. Conjunctiva/sclera: Conjunctivae normal.      Pupils: Pupils are equal, round, and reactive to light. Neck:      Thyroid: No thyromegaly. Cardiovascular:      Rate and Rhythm: Normal rate and regular rhythm. Heart sounds: Normal heart sounds. No murmur heard. Pulmonary:      Effort: Pulmonary effort is normal. No respiratory distress. Breath sounds: Normal breath sounds. No wheezing or rales. Abdominal:      General: Bowel sounds are normal. There is no distension. Palpations: Abdomen is soft. Tenderness: There is no abdominal tenderness. Musculoskeletal:         General: No tenderness. Normal range of motion. Cervical back: Normal range of motion and neck supple. No rigidity. No muscular tenderness. Lymphadenopathy:      Cervical: No cervical adenopathy. Skin:     General: Skin is warm and dry. Findings: No erythema or rash. Neurological:      General: No focal deficit present. Mental Status: She is alert and oriented to person, place, and time. Cranial Nerves: No cranial nerve deficit. Deep Tendon Reflexes: Reflexes are normal and symmetric. Reflexes normal.   Psychiatric:         Mood and Affect: Mood normal.                                 ASSESSMENT/PLAN:    Patient Active Problem List   Diagnosis    Essential hypertension    Mixed hyperlipidemia    Fatigue       PAYAM was seen today for hyperlipidemia and hypertension. Diagnoses and all orders for this visit:    Essential hypertension  -     Urinalysis; Future    Mixed hyperlipidemia    History of 2019 novel coronavirus disease (COVID-19)    Chronic obstructive pulmonary disease, unspecified COPD type ([facility] [address]      Labs reviewed  meds reviewed  Doing well with the Trelegy  Concerned about family history of renal cell CA     Return in about 4 months (around 2/26/2022) for Medicare AWV. I spent 20 minutes with this patient. I spent greater than 50% of the time counseling this patient.         Lex Sommers DO  10/26/2021  7:49 AM

## 2021-12-21 RX ORDER — MONTELUKAST SODIUM 10 MG/1
TABLET ORAL
Qty: 30 TABLET | Refills: 3 | Status: SHIPPED
Start: 2021-12-21 | End: 2022-03-21 | Stop reason: SDUPTHER

## 2022-02-24 ENCOUNTER — OFFICE VISIT (OUTPATIENT)
Dept: PRIMARY CARE CLINIC | Age: 82
End: 2022-02-24
Payer: MEDICARE

## 2022-02-24 VITALS
TEMPERATURE: 97.3 F | WEIGHT: 223 LBS | DIASTOLIC BLOOD PRESSURE: 74 MMHG | OXYGEN SATURATION: 97 % | HEART RATE: 74 BPM | SYSTOLIC BLOOD PRESSURE: 130 MMHG | BODY MASS INDEX: 42.1 KG/M2 | HEIGHT: 61 IN

## 2022-02-24 DIAGNOSIS — J44.9 CHRONIC OBSTRUCTIVE PULMONARY DISEASE, UNSPECIFIED COPD TYPE (HCC): ICD-10-CM

## 2022-02-24 DIAGNOSIS — D71 GRANULOMATOUS DISEASE (HCC): Primary | ICD-10-CM

## 2022-02-24 DIAGNOSIS — Z00.00 MEDICARE ANNUAL WELLNESS VISIT, SUBSEQUENT: ICD-10-CM

## 2022-02-24 PROCEDURE — 1123F ACP DISCUSS/DSCN MKR DOCD: CPT | Performed by: FAMILY MEDICINE

## 2022-02-24 PROCEDURE — G0439 PPPS, SUBSEQ VISIT: HCPCS | Performed by: FAMILY MEDICINE

## 2022-02-24 PROCEDURE — 4040F PNEUMOC VAC/ADMIN/RCVD: CPT | Performed by: FAMILY MEDICINE

## 2022-02-24 PROCEDURE — G8484 FLU IMMUNIZE NO ADMIN: HCPCS | Performed by: FAMILY MEDICINE

## 2022-02-24 ASSESSMENT — PATIENT HEALTH QUESTIONNAIRE - PHQ9
1. LITTLE INTEREST OR PLEASURE IN DOING THINGS: 0
SUM OF ALL RESPONSES TO PHQ QUESTIONS 1-9: 0
SUM OF ALL RESPONSES TO PHQ9 QUESTIONS 1 & 2: 0
2. FEELING DOWN, DEPRESSED OR HOPELESS: 0
SUM OF ALL RESPONSES TO PHQ QUESTIONS 1-9: 0

## 2022-02-24 ASSESSMENT — LIFESTYLE VARIABLES: HOW OFTEN DO YOU HAVE A DRINK CONTAINING ALCOHOL: NEVER

## 2022-02-24 NOTE — PROGRESS NOTES
Medicare Annual Wellness Visit    Patience Kingsley is here for Medicare [address] was seen today for medicare awv. Diagnoses and all orders for this visit:    Granulomatous disease ([facility] [address]    Chronic obstructive pulmonary disease, unspecified COPD type ([facility] [address]    Medicare annual wellness visit, subsequent         Recommendations for Preventive Services Due: see orders and patient instructions/AVS.  Recommended screening schedule for the next 5-10 years is provided to the patient in written form: see Patient Instructions/AVS.     Return for Medicare Annual Wellness Visit in 1 year. Subjective   The following acute and/or chronic problems were also addressed today:  COPD/Granulomatous disease  COPD  There is no cough, difficulty breathing, shortness of breath, sputum production or wheezing. This is a new problem. The current episode started more than 1 month ago. The problem occurs daily. The problem has been gradually improving. Pertinent negatives include no appetite change, chest pain, ear pain, fever, headaches, myalgias, rhinorrhea or sore throat. Her symptoms are aggravated by nothing. Her symptoms are alleviated by beta-agonist, steroid inhaler and ipratropium. She reports significant improvement on treatment. Risk factors for lung disease include occupational exposure and smoking/tobacco exposure. Her past medical history is significant for COPD. Patient's complete Health Risk Assessment and screening values have been reviewed and are found in Flowsheets. The following problems were reviewed today and where indicated follow up appointments were made and/or referrals ordered.     Positive Risk Factor Screenings with Interventions:               General Health and ACP:  General  In general, how would you say your health is?: Very Good  In the past 7 days, have you experienced any of the following: New or Increased Pain, New or Increased Fatigue, Loneliness, Social Isolation, Stress or Anger?: No  Do you get the social and emotional support that you need?: Yes  Do you have a Living Will?: (!) No    Advance Directives     Power of Leila Victor Will ACP-Advance Directive ACP-Power of     Not on File Not on File Not on File Not on File      General Health Risk Interventions:  · No Living Will: Patient declines ACP discussion/assistance    Health Habits/Nutrition:     Physical Activity: Inactive    Days of Exercise per Week: 0 days    Minutes of Exercise per Session: 0 min     Have you lost any weight without trying in the past 3 months?: No  Body mass index: (!) 42.13  Have you seen the dentist within the past year?: Yes    Health Habits/Nutrition Interventions:  · Inadequate physical activity:  patient is not ready to increase his/her physical activity level at this time  · Nutritional issues:  patient is not ready to address his/her nutritional/weight issues at this time     Safety:  Do you have working smoke detectors?: Yes  Do you have any tripping hazards - loose or unsecured carpets or rugs?: No  Do you have any tripping hazards - clutter in doorways, halls, or stairs?: No  Do you have either shower bars, grab bars, non-slip mats or non-slip surfaces in your shower or bathtub?: Yes  Do all of your stairways have a railing or banister?: (!) No  Do you always fasten your seatbelt when you are in a car?: Yes    Safety Interventions:  · Home safety tips provided           Objective   Vitals:    02/24/22 1322   BP: 130/74   Pulse: 74   Temp: 97.3 °F (36.3 °C)   SpO2: 97%   Weight: 223 lb (101.2 kg)   Height: 5' 1\" (1.549 m)      Body mass index is 42.14 kg/m².         General Appearance: alert and oriented to person, place and time, well developed and well- nourished, in no acute distress  Skin: warm and dry, no rash or erythema  Head: normocephalic and atraumatic  Eyes: pupils equal, round, and reactive to light, extraocular eye movements intact, conjunctivae normal  ENT: tympanic  (90 Base) MCG/ACT inhaler Inhale 2 puffs into the lungs every 6 hours as needed for Wheezing Yes Sivan Somers DO   Multiple Vitamins-Minerals (CENTRUM PO) Take by mouth Yes Historical Provider, MD   Cholecalciferol (VITAMIN D3 PO) Take 2,000 Units by mouth  Yes Historical Provider, MD       CareTeam (Including outside providers/suppliers regularly involved in providing care):   Patient Care Team:  Sivan Somers DO as PCP - General (Family Medicine)  Sivan Somers DO as PCP - [address] [place] [address]  Empaneled Provider  Rafael Arauz MD as Surgeon (General Surgery)  Juwan Lunsford MD as Consulting Physician (Cardiology)    Reviewed and updated this visit:  Tobacco  Allergies  Meds  Problems  Med Hx  Surg Hx  Soc Hx  Fam Hx

## 2022-03-22 RX ORDER — MONTELUKAST SODIUM 10 MG/1
10 TABLET ORAL NIGHTLY
Qty: 90 TABLET | Refills: 0 | Status: SHIPPED
Start: 2022-03-22 | End: 2022-07-01

## 2022-03-26 DIAGNOSIS — I10 ESSENTIAL HYPERTENSION: ICD-10-CM

## 2022-03-28 RX ORDER — CARVEDILOL 3.12 MG/1
TABLET ORAL
Qty: 180 TABLET | Refills: 1 | Status: SHIPPED
Start: 2022-03-28 | End: 2022-10-03

## 2022-07-01 RX ORDER — MONTELUKAST SODIUM 10 MG/1
10 TABLET ORAL NIGHTLY
Qty: 90 TABLET | Refills: 0 | Status: SHIPPED
Start: 2022-07-01 | End: 2022-10-03

## 2022-07-26 ENCOUNTER — TELEPHONE (OUTPATIENT)
Dept: PRIMARY CARE CLINIC | Age: 82
End: 2022-07-26

## 2022-07-26 DIAGNOSIS — I10 ESSENTIAL HYPERTENSION: Primary | ICD-10-CM

## 2022-07-26 DIAGNOSIS — E78.2 MIXED HYPERLIPIDEMIA: ICD-10-CM

## 2022-07-26 NOTE — TELEPHONE ENCOUNTER
----- Message from Nayeli Lewis sent at 7/26/2022 10:37 AM EDT -----  Subject: Referral Request    Reason for referral request? Patient has follow up appointment schedule   for 8/25/2022 at 215pm. Patient is requesting an order for blood lab work   be issued. Patient would like to have blood work completed before her   visit. Provider patient wants to be referred to(if known):     Provider Phone Number(if known): Additional Information for Provider?  Please contact her once the orders   have been submitted.   ---------------------------------------------------------------------------  --------------  Negro ELISE    [phone]; OK to leave message on voicemail  ---------------------------------------------------------------------------  --------------

## 2022-08-18 DIAGNOSIS — E78.2 MIXED HYPERLIPIDEMIA: ICD-10-CM

## 2022-08-18 DIAGNOSIS — I10 ESSENTIAL HYPERTENSION: ICD-10-CM

## 2022-08-18 LAB
ALBUMIN SERPL-MCNC: 4 G/DL (ref 3.5–5.2)
ALP BLD-CCNC: 70 U/L (ref 35–104)
ALT SERPL-CCNC: 10 U/L (ref 0–32)
ANION GAP SERPL CALCULATED.3IONS-SCNC: 15 MMOL/L (ref 7–16)
AST SERPL-CCNC: 18 U/L (ref 0–31)
BILIRUB SERPL-MCNC: 0.5 MG/DL (ref 0–1.2)
BUN BLDV-MCNC: 13 MG/DL (ref 6–23)
CALCIUM SERPL-MCNC: 9.4 MG/DL (ref 8.6–10.2)
CHLORIDE BLD-SCNC: 104 MMOL/L (ref 98–107)
CHOLESTEROL, TOTAL: 167 MG/DL (ref 0–199)
CO2: 23 MMOL/L (ref 22–29)
CREAT SERPL-MCNC: 0.7 MG/DL (ref 0.5–1)
GFR AFRICAN AMERICAN: >60
GFR NON-AFRICAN AMERICAN: >60 ML/MIN/1.73
GLUCOSE BLD-MCNC: 119 MG/DL (ref 74–99)
HCT VFR BLD CALC: 42 % (ref 34–48)
HDLC SERPL-MCNC: 35 MG/DL
HEMOGLOBIN: 13.7 G/DL (ref 11.5–15.5)
LDL CHOLESTEROL CALCULATED: 107 MG/DL (ref 0–99)
MCH RBC QN AUTO: 32.3 PG (ref 26–35)
MCHC RBC AUTO-ENTMCNC: 32.6 % (ref 32–34.5)
MCV RBC AUTO: 99.1 FL (ref 80–99.9)
PDW BLD-RTO: 12.8 FL (ref 11.5–15)
PLATELET # BLD: 255 E9/L (ref 130–450)
PMV BLD AUTO: 11 FL (ref 7–12)
POTASSIUM SERPL-SCNC: 4.2 MMOL/L (ref 3.5–5)
RBC # BLD: 4.24 E12/L (ref 3.5–5.5)
SODIUM BLD-SCNC: 142 MMOL/L (ref 132–146)
TOTAL PROTEIN: 7.1 G/DL (ref 6.4–8.3)
TRIGL SERPL-MCNC: 123 MG/DL (ref 0–149)
TSH SERPL DL<=0.05 MIU/L-ACNC: 1.98 UIU/ML (ref 0.27–4.2)
VLDLC SERPL CALC-MCNC: 25 MG/DL
WBC # BLD: 9.3 E9/L (ref 4.5–11.5)

## 2022-08-25 ENCOUNTER — OFFICE VISIT (OUTPATIENT)
Dept: PRIMARY CARE CLINIC | Age: 82
End: 2022-08-25
Payer: MEDICARE

## 2022-08-25 VITALS
HEIGHT: 61 IN | DIASTOLIC BLOOD PRESSURE: 76 MMHG | WEIGHT: 222 LBS | BODY MASS INDEX: 41.91 KG/M2 | RESPIRATION RATE: 16 BRPM | HEART RATE: 78 BPM | OXYGEN SATURATION: 98 % | SYSTOLIC BLOOD PRESSURE: 134 MMHG

## 2022-08-25 DIAGNOSIS — R05.9 COUGH: ICD-10-CM

## 2022-08-25 DIAGNOSIS — I10 ESSENTIAL HYPERTENSION: Primary | ICD-10-CM

## 2022-08-25 DIAGNOSIS — J30.2 SEASONAL ALLERGIES: ICD-10-CM

## 2022-08-25 DIAGNOSIS — J44.9 CHRONIC OBSTRUCTIVE PULMONARY DISEASE, UNSPECIFIED COPD TYPE (HCC): ICD-10-CM

## 2022-08-25 DIAGNOSIS — E78.2 MIXED HYPERLIPIDEMIA: ICD-10-CM

## 2022-08-25 DIAGNOSIS — E55.9 VITAMIN D DEFICIENCY: ICD-10-CM

## 2022-08-25 PROCEDURE — G8427 DOCREV CUR MEDS BY ELIG CLIN: HCPCS | Performed by: FAMILY MEDICINE

## 2022-08-25 PROCEDURE — 99214 OFFICE O/P EST MOD 30 MIN: CPT | Performed by: FAMILY MEDICINE

## 2022-08-25 PROCEDURE — 1123F ACP DISCUSS/DSCN MKR DOCD: CPT | Performed by: FAMILY MEDICINE

## 2022-08-25 PROCEDURE — G8417 CALC BMI ABV UP PARAM F/U: HCPCS | Performed by: FAMILY MEDICINE

## 2022-08-25 PROCEDURE — 1090F PRES/ABSN URINE INCON ASSESS: CPT | Performed by: FAMILY MEDICINE

## 2022-08-25 PROCEDURE — 1036F TOBACCO NON-USER: CPT | Performed by: FAMILY MEDICINE

## 2022-08-25 PROCEDURE — G8400 PT W/DXA NO RESULTS DOC: HCPCS | Performed by: FAMILY MEDICINE

## 2022-08-25 PROCEDURE — 3023F SPIROM DOC REV: CPT | Performed by: FAMILY MEDICINE

## 2022-08-25 RX ORDER — FLUTICASONE PROPIONATE 50 MCG
1 SPRAY, SUSPENSION (ML) NASAL DAILY
Qty: 16 G | Refills: 2 | Status: SHIPPED | OUTPATIENT
Start: 2022-08-25

## 2022-08-25 ASSESSMENT — ENCOUNTER SYMPTOMS
EYE REDNESS: 0
BACK PAIN: 0
DIARRHEA: 0
EYE ITCHING: 0
SINUS PRESSURE: 0
SPUTUM PRODUCTION: 0
BLOOD IN STOOL: 0
NAUSEA: 0
ABDOMINAL PAIN: 0
SHORTNESS OF BREATH: 0
COLOR CHANGE: 0
CHEST TIGHTNESS: 0
RHINORRHEA: 0
SORE THROAT: 0
CONSTIPATION: 0
COUGH: 1
EYE PAIN: 0
DIFFICULTY BREATHING: 0
APNEA: 0
WHEEZING: 0
VOMITING: 0

## 2022-08-25 ASSESSMENT — PATIENT HEALTH QUESTIONNAIRE - PHQ9
1. LITTLE INTEREST OR PLEASURE IN DOING THINGS: 0
2. FEELING DOWN, DEPRESSED OR HOPELESS: 0
SUM OF ALL RESPONSES TO PHQ QUESTIONS 1-9: 0
SUM OF ALL RESPONSES TO PHQ9 QUESTIONS 1 & 2: 0
SUM OF ALL RESPONSES TO PHQ QUESTIONS 1-9: 0

## 2022-08-25 ASSESSMENT — COPD QUESTIONNAIRES: COPD: 1

## 2022-08-25 NOTE — PROGRESS NOTES
Chief Complaint:     Chief Complaint   Patient presents with    Hypertension    Hyperlipidemia    Cough     Has had chronic cough for years has gotten worse recently. Hypertension  This is a chronic problem. The current episode started more than 1 year ago. The problem is unchanged. The problem is controlled. Pertinent negatives include no chest pain, headaches, neck pain, palpitations or shortness of breath. There are no known risk factors for coronary artery disease. Past treatments include beta blockers. The current treatment provides significant improvement. There are no compliance problems. There is no history of CAD/MI, CVA, heart failure, left ventricular hypertrophy or PVD. There is no history of a hypertension causing med, pheochromocytoma, renovascular disease, sleep apnea or a thyroid problem. Hyperlipidemia  This is a chronic problem. The current episode started more than 1 year ago. Exacerbating diseases include obesity. Pertinent negatives include no chest pain, myalgias or shortness of breath. Current antihyperlipidemic treatment includes statins. There are no compliance problems. Risk factors for coronary artery disease include dyslipidemia, hypertension, obesity and post-menopausal.   Cough  This is a recurrent problem. The current episode started more than 1 year ago. The problem has been waxing and waning. The cough is Non-productive. Associated symptoms include nasal congestion and postnasal drip. Pertinent negatives include no chest pain, ear pain, eye redness, fever, headaches, myalgias, rash, rhinorrhea, sore throat, shortness of breath or wheezing. Nothing aggravates the symptoms. She has tried leukotriene antagonists for the symptoms. Her past medical history is significant for COPD. There is no history of environmental allergies. COPD  She complains of cough. There is no difficulty breathing, shortness of breath, sputum production or wheezing. This is a new problem.  The current episode started more than 1 month ago. The problem occurs daily. The problem has been gradually improving. Associated symptoms include nasal congestion and postnasal drip. Pertinent negatives include no appetite change, chest pain, ear pain, fever, headaches, myalgias, rhinorrhea or sore throat. Her symptoms are aggravated by nothing. Her symptoms are alleviated by beta-agonist, steroid inhaler and ipratropium. She reports significant improvement on treatment. Risk factors for lung disease include occupational exposure and smoking/tobacco exposure. Her past medical history is significant for COPD.      Patient Active Problem List   Diagnosis    Essential hypertension    Mixed hyperlipidemia    Fatigue    Granulomatous disease ([facility]    Chronic obstructive pulmonary disease, unspecified COPD type ([facility]    Seasonal allergies    Vitamin D deficiency       Past Medical History:   Diagnosis Date    Hyperlipidemia     Hypertension        Past Surgical History:   Procedure Laterality Date    BREAST SURGERY Right     lumpectomy    CHOLECYSTECTOMY      COLONOSCOPY  02/20/2017    TUBAL LIGATION         Current Outpatient Medications   Medication Sig Dispense Refill    fluticasone (FLONASE) 50 MCG/ACT nasal spray 1 spray by Each Nostril route daily 16 g 2    montelukast (SINGULAIR) 10 MG tablet TAKE 1 TABLET BY MOUTH NIGHTLY 90 tablet 0    carvedilol (COREG) 3.125 MG tablet TAKE ONE TABLET BY MOUTH TWO TIMES A DAY WITH MEALS 180 tablet 1    fluticasone-umeclidin-vilant (TRELEGY ELLIPTA) 100-62.5-25 MCG/INH AEPB Inhale 1 puff into the lungs daily 60 each 12    ondansetron (ZOFRAN ODT) 4 MG disintegrating tablet Take 1 tablet by mouth every 8 hours as needed for Nausea or Vomiting 20 tablet 0    Biotin 10 MG tablet Take 10 mg by mouth daily      mometasone-formoterol (DULERA) 100-5 MCG/ACT inhaler Inhale 2 puffs into the lungs 2 times daily 13 g 5    rosuvastatin (CRESTOR) 5 MG tablet Take 1 tablet by mouth daily 90 tablet 3    ipratropium (ATROVENT) 0.03 % nasal spray 2 sprays by Each Nostril route daily 1 Bottle 3    albuterol sulfate  (90 Base) MCG/ACT inhaler Inhale 2 puffs into the lungs every 6 hours as needed for Wheezing 1 Inhaler 3    Multiple Vitamins-Minerals (CENTRUM PO) Take by mouth      Cholecalciferol (VITAMIN D3 PO) Take 2,000 Units by mouth        No current facility-administered medications for this visit. Allergies   Allergen Reactions    Asa [Aspirin]      History of nose bleed while on ASA required ER visit and packingof the nose       Social History     Socioeconomic History    Marital status:      Spouse name: None    Number of children: None    Years of education: None    Highest education level: None   Occupational History    Occupation: retired- [employer]    Tobacco Use    Smoking status: Never    Smokeless tobacco: Never   Vaping Use    Vaping Use: Never used   Substance and Sexual Activity    Alcohol use: No    Drug use: No    Sexual activity: Not Currently     Social Determinants of Health     Financial Resource Strain: Low Risk     Difficulty of Paying Living Expenses: Not hard at all   Food Insecurity: No Food Insecurity    Worried About Running Out of Food in the Last Year: Never true    Ran Out of Food in the Last Year: Never true   Physical Activity: Inactive    Days of Exercise per Week: 0 days    Minutes of Exercise per Session: 0 min       Family History   Problem Relation Age of Onset    Heart Attack Father 68        massive          Review of Systems   Constitutional:  Negative for activity change, appetite change, fatigue and fever. HENT:  Positive for postnasal drip. Negative for congestion, ear pain, hearing loss, nosebleeds, rhinorrhea, sinus pressure and sore throat. Eyes:  Negative for pain, redness, itching and visual disturbance. Respiratory:  Positive for cough.  Negative for apnea, sputum production, chest tightness, shortness of breath and wheezing. Cardiovascular:  Negative for chest pain, palpitations and leg swelling. Gastrointestinal:  Negative for abdominal pain, blood in stool, constipation, diarrhea, nausea and vomiting. Endocrine: Negative. Genitourinary:  Negative for decreased urine volume, difficulty urinating, dysuria, frequency, hematuria and urgency. Musculoskeletal:  Negative for arthralgias, back pain, gait problem, myalgias and neck pain. Skin:  Negative for color change and rash. Allergic/Immunologic: Negative for environmental allergies and food allergies. Neurological:  Negative for dizziness, weakness, light-headedness, numbness and headaches. Hematological:  Negative for adenopathy. Does not bruise/bleed easily. Psychiatric/Behavioral:  Negative for behavioral problems, dysphoric mood and sleep disturbance. The patient is not nervous/anxious and is not hyperactive. All other systems reviewed and are negative. /76   Pulse 78   Resp 16   Ht 5' 1\" (1.549 m)   Wt 222 lb (100.7 kg)   SpO2 98%   BMI 41.95 kg/m²     Physical Exam  Vitals and nursing note reviewed. Constitutional:       General: She is not in acute distress. Appearance: Normal appearance. She is well-developed. HENT:      Head: Normocephalic and atraumatic. Right Ear: Hearing, tympanic membrane and external ear normal. No tenderness. No middle ear effusion. Left Ear: Hearing, tympanic membrane and external ear normal. No tenderness. No middle ear effusion. Nose: Nose normal. No congestion or rhinorrhea. Right Turbinates: Not enlarged. Left Turbinates: Not enlarged. Mouth/Throat:      Mouth: Mucous membranes are moist.      Tongue: No lesions. Pharynx: Oropharynx is clear. No oropharyngeal exudate or posterior oropharyngeal erythema. Eyes:      General: No scleral icterus.      Conjunctiva/sclera: Conjunctivae normal.      Pupils: Pupils are equal, round, and reactive to light. Neck:      Thyroid: No thyromegaly. Cardiovascular:      Rate and Rhythm: Normal rate and regular rhythm. Heart sounds: Normal heart sounds. No murmur heard. Pulmonary:      Effort: Pulmonary effort is normal. No respiratory distress. Breath sounds: Normal breath sounds. No wheezing or rales. Abdominal:      General: Bowel sounds are normal. There is no distension. Palpations: Abdomen is soft. Tenderness: There is no abdominal tenderness. Musculoskeletal:         General: No tenderness. Normal range of motion. Cervical back: Normal range of motion and neck supple. No rigidity. No muscular tenderness. Lymphadenopathy:      Cervical: No cervical adenopathy. Skin:     General: Skin is warm and dry. Findings: No erythema or rash. Neurological:      General: No focal deficit present. Mental Status: She is alert and oriented to person, place, and time. Cranial Nerves: No cranial nerve deficit. Deep Tendon Reflexes: Reflexes are normal and symmetric. Reflexes normal.   Psychiatric:         Mood and Affect: Mood normal.                               ASSESSMENT/PLAN:    Patient Active Problem List   Diagnosis    Essential hypertension    Mixed hyperlipidemia    Fatigue    Granulomatous disease ([address]    Chronic obstructive pulmonary disease, unspecified COPD type ([address]    Seasonal allergies    Vitamin D deficiency       Harsha Stevenson was seen today for hypertension, hyperlipidemia and cough. Diagnoses and all orders for this visit:    Essential hypertension  -     Lipid Panel; Future  -     Comprehensive Metabolic Panel; Future  -     TSH; Future    Mixed hyperlipidemia  -     Lipid Panel; Future  -     Comprehensive Metabolic Panel; Future  -     TSH; Future    Chronic obstructive pulmonary disease, unspecified COPD type ([address]  -     XR CHEST (2 VW); Future    Cough  -     XR CHEST (2 VW);  Future    Seasonal allergies    Vitamin D deficiency  - Vitamin D 25 Hydroxy; Future    Other orders  -     fluticasone (FLONASE) 50 MCG/ACT nasal spray; 1 spray by Each Nostril route daily        Return in about 6 months (around 2/25/2023) for Medicare AW. I spent 30 minutes with this patient. I spent greater than 50% of the time counseling this patient.         Lizzette Valdes DO  8/25/2022  2:28 PM

## 2022-09-01 DIAGNOSIS — E78.2 MIXED HYPERLIPIDEMIA: ICD-10-CM

## 2022-09-01 RX ORDER — ROSUVASTATIN CALCIUM 5 MG/1
TABLET, COATED ORAL
Qty: 90 TABLET | Refills: 3 | Status: SHIPPED | OUTPATIENT
Start: 2022-09-01

## 2022-10-03 DIAGNOSIS — I10 ESSENTIAL HYPERTENSION: ICD-10-CM

## 2022-10-03 RX ORDER — CARVEDILOL 3.12 MG/1
TABLET ORAL
Qty: 180 TABLET | Refills: 1 | Status: SHIPPED | OUTPATIENT
Start: 2022-10-03

## 2022-10-03 RX ORDER — MONTELUKAST SODIUM 10 MG/1
TABLET ORAL
Qty: 90 TABLET | Refills: 0 | Status: SHIPPED | OUTPATIENT
Start: 2022-10-03

## 2022-10-17 ENCOUNTER — OFFICE VISIT (OUTPATIENT)
Dept: PRIMARY CARE CLINIC | Age: 82
End: 2022-10-17
Payer: MEDICARE

## 2022-10-17 VITALS
HEIGHT: 62 IN | HEART RATE: 83 BPM | OXYGEN SATURATION: 93 % | WEIGHT: 226 LBS | BODY MASS INDEX: 41.59 KG/M2 | DIASTOLIC BLOOD PRESSURE: 72 MMHG | RESPIRATION RATE: 16 BRPM | TEMPERATURE: 96.9 F | SYSTOLIC BLOOD PRESSURE: 130 MMHG

## 2022-10-17 DIAGNOSIS — R05.9 COUGH, UNSPECIFIED TYPE: Primary | ICD-10-CM

## 2022-10-17 DIAGNOSIS — R06.02 SOB (SHORTNESS OF BREATH): ICD-10-CM

## 2022-10-17 DIAGNOSIS — B96.89 ACUTE BACTERIAL SINUSITIS: ICD-10-CM

## 2022-10-17 DIAGNOSIS — J01.90 ACUTE BACTERIAL SINUSITIS: ICD-10-CM

## 2022-10-17 PROCEDURE — G8400 PT W/DXA NO RESULTS DOC: HCPCS | Performed by: FAMILY MEDICINE

## 2022-10-17 PROCEDURE — G8427 DOCREV CUR MEDS BY ELIG CLIN: HCPCS | Performed by: FAMILY MEDICINE

## 2022-10-17 PROCEDURE — 99213 OFFICE O/P EST LOW 20 MIN: CPT | Performed by: FAMILY MEDICINE

## 2022-10-17 PROCEDURE — 1036F TOBACCO NON-USER: CPT | Performed by: FAMILY MEDICINE

## 2022-10-17 PROCEDURE — G8417 CALC BMI ABV UP PARAM F/U: HCPCS | Performed by: FAMILY MEDICINE

## 2022-10-17 PROCEDURE — G8484 FLU IMMUNIZE NO ADMIN: HCPCS | Performed by: FAMILY MEDICINE

## 2022-10-17 PROCEDURE — 1090F PRES/ABSN URINE INCON ASSESS: CPT | Performed by: FAMILY MEDICINE

## 2022-10-17 PROCEDURE — 1123F ACP DISCUSS/DSCN MKR DOCD: CPT | Performed by: FAMILY MEDICINE

## 2022-10-17 RX ORDER — CEFDINIR 300 MG/1
300 CAPSULE ORAL 2 TIMES DAILY
Qty: 20 CAPSULE | Refills: 0 | Status: SHIPPED | OUTPATIENT
Start: 2022-10-17 | End: 2022-10-27

## 2022-10-17 RX ORDER — BENZONATATE 200 MG/1
200 CAPSULE ORAL 3 TIMES DAILY PRN
Qty: 30 CAPSULE | Refills: 0 | Status: SHIPPED | OUTPATIENT
Start: 2022-10-17 | End: 2022-10-24

## 2022-10-17 ASSESSMENT — ENCOUNTER SYMPTOMS
CONSTIPATION: 0
BLOOD IN STOOL: 0
EYE PAIN: 0
DIARRHEA: 0
CHEST TIGHTNESS: 0
VOMITING: 0
BACK PAIN: 0
ABDOMINAL PAIN: 0
SHORTNESS OF BREATH: 1
SORE THROAT: 1
EYE REDNESS: 0
APNEA: 0
WHEEZING: 0
RHINORRHEA: 1
SINUS PAIN: 1
EYE ITCHING: 0
NAUSEA: 1
SINUS PRESSURE: 0
COLOR CHANGE: 0
COUGH: 1

## 2022-10-17 NOTE — PROGRESS NOTES
Chief Complaint:     Chief Complaint   Patient presents with    Cough     Saturday night    Nasal Congestion    Shortness of Breath         Cough  This is a new problem. The current episode started in the past 7 days. The problem has been unchanged. The cough is Non-productive. Associated symptoms include ear pain, rhinorrhea, a sore throat and shortness of breath. Pertinent negatives include no chest pain, chills, eye redness, fever, headaches, myalgias, rash or wheezing. Nothing aggravates the symptoms. She has tried OTC cough suppressant for the symptoms. The treatment provided no relief. There is no history of environmental allergies. URI   This is a new problem. The current episode started in the past 7 days. The problem has been unchanged. There has been no fever. Associated symptoms include congestion, coughing, ear pain, nausea, rhinorrhea, sinus pain and a sore throat. Pertinent negatives include no abdominal pain, chest pain, diarrhea, dysuria, headaches, neck pain, rash, vomiting or wheezing. She has tried nothing for the symptoms. The treatment provided no relief.      Patient Active Problem List   Diagnosis    Essential hypertension    Mixed hyperlipidemia    Fatigue    Granulomatous disease (HCC)    Chronic obstructive pulmonary disease, unspecified COPD type ([facility] [address]    Seasonal allergies    Vitamin D deficiency       Past Medical History:   Diagnosis Date    Hyperlipidemia     Hypertension        Past Surgical History:   Procedure Laterality Date    BREAST SURGERY Right     lumpectomy    CHOLECYSTECTOMY      COLONOSCOPY  02/20/2017    TUBAL LIGATION         Current Outpatient Medications   Medication Sig Dispense Refill    cefdinir (OMNICEF) 300 MG capsule Take 1 capsule by mouth 2 times daily for 10 days 20 capsule 0    benzonatate (TESSALON) 200 MG capsule Take 1 capsule by mouth 3 times daily as needed for Cough 30 capsule 0    Budeson-Glycopyrrol-Formoterol 160-9-4.8 MCG/ACT AERO Inhale 2 puffs into the lungs in the morning and at bedtime 10.7 g 5    montelukast (SINGULAIR) 10 MG tablet TAKE ONE TABLET BY MOUTH EVERY DAY IN THE EVENING 90 tablet 0    carvedilol (COREG) 3.125 MG tablet TAKE ONE TABLET BY MOUTH TWICE A DAY WITH MEALS 180 tablet 1    rosuvastatin (CRESTOR) 5 MG tablet TAKE ONE TABLET BY MOUTH EVERY DAY 90 tablet 3    fluticasone (FLONASE) 50 MCG/ACT nasal spray 1 spray by Each Nostril route daily 16 g 2    fluticasone-umeclidin-vilant (TRELEGY ELLIPTA) 100-62.5-25 MCG/INH AEPB Inhale 1 puff into the lungs daily 60 each 12    albuterol sulfate  (90 Base) MCG/ACT inhaler Inhale 2 puffs into the lungs every 6 hours as needed for Wheezing 1 Inhaler 3    Multiple Vitamins-Minerals (CENTRUM PO) Take by mouth      Cholecalciferol (VITAMIN D3 PO) Take 2,000 Units by mouth        No current facility-administered medications for this visit.        Allergies   Allergen Reactions    Asa [Aspirin]      History of nose bleed while on ASA required ER visit and packingof the nose       Social History     Socioeconomic History    Marital status:      Spouse name: None    Number of children: None    Years of education: None    Highest education level: None   Occupational History    Occupation: retired- [employer] [address]   Tobacco Use    Smoking status: Never    Smokeless tobacco: Never   Vaping Use    Vaping Use: Never used   Substance and Sexual Activity    Alcohol use: No    Drug use: No    Sexual activity: Not Currently     Social Determinants of Health     Financial Resource Strain: Low Risk     Difficulty of Paying Living Expenses: Not hard at all   Food Insecurity: No Food Insecurity    Worried About Running Out of Food in the Last Year: Never true    Ran Out of Food in the Last Year: Never true   Physical Activity: Inactive    Days of Exercise per Week: 0 days    Minutes of Exercise per Session: 0 min       Family History   Problem Relation Age of Onset    Heart Attack Father 68        massive          Review of Systems   Constitutional:  Positive for fatigue. Negative for activity change, appetite change, chills and fever. HENT:  Positive for congestion, ear pain, rhinorrhea, sinus pain and sore throat. Negative for hearing loss, nosebleeds and sinus pressure. Eyes:  Negative for pain, redness, itching and visual disturbance. Respiratory:  Positive for cough and shortness of breath. Negative for apnea, chest tightness and wheezing. Cardiovascular:  Negative for chest pain, palpitations and leg swelling. Gastrointestinal:  Positive for nausea. Negative for abdominal pain, blood in stool, constipation, diarrhea and vomiting. Endocrine: Negative. Genitourinary:  Negative for decreased urine volume, difficulty urinating, dysuria, frequency, hematuria and urgency. Musculoskeletal:  Negative for arthralgias, back pain, gait problem, myalgias and neck pain. Skin:  Negative for color change and rash. Allergic/Immunologic: Negative for environmental allergies and food allergies. Neurological:  Negative for dizziness, weakness, light-headedness, numbness and headaches. Hematological:  Negative for adenopathy. Does not bruise/bleed easily. Psychiatric/Behavioral:  Negative for behavioral problems, dysphoric mood and sleep disturbance. The patient is not nervous/anxious and is not hyperactive. All other systems reviewed and are negative. /72   Pulse 83   Temp 96.9 °F (36.1 °C)   Resp 16   Ht 5' 2\" (1.575 m)   Wt 226 lb (102.5 kg)   SpO2 93%   BMI 41.34 kg/m²     Physical Exam  Vitals and nursing note reviewed. Constitutional:       Appearance: She is well-developed. She is not toxic-appearing. HENT:      Head: Normocephalic and atraumatic. Right Ear: Ear canal normal. A middle ear effusion is present. Tympanic membrane is bulging. Left Ear: Ear canal normal. A middle ear effusion is present. Tympanic membrane is bulging. Nose: Mucosal edema, congestion and rhinorrhea present. No nasal deformity or septal deviation. Right Turbinates: Enlarged and swollen. Left Turbinates: Enlarged and swollen. Right Sinus: Maxillary sinus tenderness present. Left Sinus: Maxillary sinus tenderness present. Mouth/Throat:      Mouth: Mucous membranes are not pale and not dry. Pharynx: Uvula midline. Posterior oropharyngeal erythema present. No oropharyngeal exudate. Tonsils: No tonsillar abscesses. Eyes:      General: Lids are normal.         Right eye: No discharge. Left eye: No discharge. Extraocular Movements: Extraocular movements intact. Right eye: Normal extraocular motion. Left eye: Normal extraocular motion. Conjunctiva/sclera: Conjunctivae normal.      Right eye: Right conjunctiva is not injected. Left eye: Left conjunctiva is not injected. Cardiovascular:      Rate and Rhythm: Normal rate and regular rhythm. Heart sounds: Normal heart sounds. No murmur heard. Pulmonary:      Effort: Pulmonary effort is normal. No respiratory distress. Breath sounds: No wheezing, rhonchi or rales. Chest:      Chest wall: No tenderness. Abdominal:      General: Bowel sounds are normal.      Palpations: Abdomen is soft. Tenderness: There is no abdominal tenderness. Musculoskeletal:         General: Normal range of motion. Cervical back: Normal range of motion and neck supple. Lymphadenopathy:      Cervical: Cervical adenopathy present. Skin:     General: Skin is warm and dry. Findings: No erythema or rash. Neurological:      General: No focal deficit present. Mental Status: She is alert.    Psychiatric:         Mood and Affect: Mood normal.                               ASSESSMENT/PLAN:    Patient Active Problem List   Diagnosis    Essential hypertension    Mixed hyperlipidemia    Fatigue    Granulomatous disease ([facility] [address]    Chronic obstructive pulmonary disease, unspecified COPD type ([facility]    Seasonal allergies    Vitamin D deficiency       Herlinda Chopra was seen today for cough, nasal congestion and shortness of breath. Diagnoses and all orders for this visit:    Cough, unspecified type    SOB (shortness of breath)    Acute bacterial sinusitis    Other orders  -     cefdinir (OMNICEF) 300 MG capsule; Take 1 capsule by mouth 2 times daily for 10 days  -     benzonatate (TESSALON) 200 MG capsule; Take 1 capsule by mouth 3 times daily as needed for Cough        Return if symptoms worsen or fail to improve. I spent 20 minutes with this patient. I spent greater than 50% of the time counseling this patient.         Samantha Mack DO  10/17/2022  12:04 PM

## 2023-02-27 ENCOUNTER — TELEPHONE (OUTPATIENT)
Dept: PRIMARY CARE CLINIC | Age: 83
End: 2023-02-27

## 2023-02-27 DIAGNOSIS — E55.9 VITAMIN D DEFICIENCY: Primary | ICD-10-CM

## 2023-02-27 DIAGNOSIS — E55.9 VITAMIN D DEFICIENCY: ICD-10-CM

## 2023-02-27 DIAGNOSIS — I10 ESSENTIAL HYPERTENSION: ICD-10-CM

## 2023-02-27 DIAGNOSIS — E78.2 MIXED HYPERLIPIDEMIA: ICD-10-CM

## 2023-02-27 LAB
ALBUMIN SERPL-MCNC: 3.8 G/DL (ref 3.5–5.2)
ALP BLD-CCNC: 82 U/L (ref 35–104)
ALT SERPL-CCNC: 13 U/L (ref 0–32)
ANION GAP SERPL CALCULATED.3IONS-SCNC: 11 MMOL/L (ref 7–16)
AST SERPL-CCNC: 19 U/L (ref 0–31)
BILIRUB SERPL-MCNC: <0.2 MG/DL (ref 0–1.2)
BUN BLDV-MCNC: 18 MG/DL (ref 6–23)
CALCIUM SERPL-MCNC: 9.8 MG/DL (ref 8.6–10.2)
CHLORIDE BLD-SCNC: 104 MMOL/L (ref 98–107)
CHOLESTEROL, TOTAL: 164 MG/DL (ref 0–199)
CO2: 29 MMOL/L (ref 22–29)
CREAT SERPL-MCNC: 0.7 MG/DL (ref 0.5–1)
GFR SERPL CREATININE-BSD FRML MDRD: >60 ML/MIN/1.73
GLUCOSE BLD-MCNC: 93 MG/DL (ref 74–99)
HCT VFR BLD CALC: 44 % (ref 34–48)
HDLC SERPL-MCNC: 35 MG/DL
HEMOGLOBIN: 14.4 G/DL (ref 11.5–15.5)
LDL CHOLESTEROL CALCULATED: 79 MG/DL (ref 0–99)
MCH RBC QN AUTO: 32.5 PG (ref 26–35)
MCHC RBC AUTO-ENTMCNC: 32.7 % (ref 32–34.5)
MCV RBC AUTO: 99.3 FL (ref 80–99.9)
PDW BLD-RTO: 12.7 FL (ref 11.5–15)
PLATELET # BLD: 265 E9/L (ref 130–450)
PMV BLD AUTO: 11.9 FL (ref 7–12)
POTASSIUM SERPL-SCNC: 4.9 MMOL/L (ref 3.5–5)
RBC # BLD: 4.43 E12/L (ref 3.5–5.5)
SODIUM BLD-SCNC: 144 MMOL/L (ref 132–146)
TOTAL PROTEIN: 6.7 G/DL (ref 6.4–8.3)
TRIGL SERPL-MCNC: 248 MG/DL (ref 0–149)
TSH SERPL DL<=0.05 MIU/L-ACNC: 2.07 UIU/ML (ref 0.27–4.2)
VITAMIN D 25-HYDROXY: 41 NG/ML (ref 30–100)
VLDLC SERPL CALC-MCNC: 50 MG/DL
WBC # BLD: 11.4 E9/L (ref 4.5–11.5)

## 2023-02-27 NOTE — TELEPHONE ENCOUNTER
----- Message from [facility] sent at 2/27/2023  9:34 AM EST -----  Subject: Referral Request    Reason for referral request? Pt is scheduled on 03/02 and she has orders   for bloodwork from 08/2022, she wants to know if Dr. Penny Diez wants her to   have these orders done before her appt on 03/02. Call pt back to let her   know if she needs to get bloodwork done or not. If she needs to know today   if she needs bloodwork or not  Provider patient wants to be referred to(if known): Max Spears    Provider Phone Number(if known):     Additional Information for Provider?   ---------------------------------------------------------------------------  --------------  [id number]    [phone]; OK to leave message on voicemail  ---------------------------------------------------------------------------  --------------

## 2023-03-02 ENCOUNTER — OFFICE VISIT (OUTPATIENT)
Dept: PRIMARY CARE CLINIC | Age: 83
End: 2023-03-02

## 2023-03-02 VITALS
BODY MASS INDEX: 40.67 KG/M2 | WEIGHT: 221 LBS | SYSTOLIC BLOOD PRESSURE: 126 MMHG | OXYGEN SATURATION: 93 % | HEART RATE: 86 BPM | DIASTOLIC BLOOD PRESSURE: 72 MMHG | RESPIRATION RATE: 16 BRPM | HEIGHT: 62 IN

## 2023-03-02 DIAGNOSIS — R13.10 DYSPHAGIA, UNSPECIFIED TYPE: ICD-10-CM

## 2023-03-02 DIAGNOSIS — E78.2 MIXED HYPERLIPIDEMIA: ICD-10-CM

## 2023-03-02 DIAGNOSIS — I10 ESSENTIAL HYPERTENSION: ICD-10-CM

## 2023-03-02 DIAGNOSIS — R05.9 COUGH, UNSPECIFIED TYPE: ICD-10-CM

## 2023-03-02 DIAGNOSIS — R53.83 FATIGUE, UNSPECIFIED TYPE: ICD-10-CM

## 2023-03-02 DIAGNOSIS — Z00.00 MEDICARE ANNUAL WELLNESS VISIT, SUBSEQUENT: Primary | ICD-10-CM

## 2023-03-02 DIAGNOSIS — J44.9 MODERATE COPD (CHRONIC OBSTRUCTIVE PULMONARY DISEASE) (HCC): ICD-10-CM

## 2023-03-02 PROBLEM — D71 GRANULOMATOUS DISEASE (HCC): Status: RESOLVED | Noted: 2022-02-24 | Resolved: 2023-03-02

## 2023-03-02 RX ORDER — AMOXICILLIN 875 MG/1
875 TABLET, COATED ORAL 2 TIMES DAILY
Qty: 20 TABLET | Refills: 0 | Status: SHIPPED | OUTPATIENT
Start: 2023-03-02 | End: 2023-03-12

## 2023-03-02 SDOH — ECONOMIC STABILITY: FOOD INSECURITY: WITHIN THE PAST 12 MONTHS, YOU WORRIED THAT YOUR FOOD WOULD RUN OUT BEFORE YOU GOT MONEY TO BUY MORE.: NEVER TRUE

## 2023-03-02 SDOH — ECONOMIC STABILITY: HOUSING INSECURITY
IN THE LAST 12 MONTHS, WAS THERE A TIME WHEN YOU DID NOT HAVE A STEADY PLACE TO SLEEP OR SLEPT IN A SHELTER (INCLUDING NOW)?: PATIENT REFUSED

## 2023-03-02 SDOH — ECONOMIC STABILITY: INCOME INSECURITY: HOW HARD IS IT FOR YOU TO PAY FOR THE VERY BASICS LIKE FOOD, HOUSING, MEDICAL CARE, AND HEATING?: NOT HARD AT ALL

## 2023-03-02 SDOH — ECONOMIC STABILITY: FOOD INSECURITY: WITHIN THE PAST 12 MONTHS, THE FOOD YOU BOUGHT JUST DIDN'T LAST AND YOU DIDN'T HAVE MONEY TO GET MORE.: NEVER TRUE

## 2023-03-02 ASSESSMENT — ENCOUNTER SYMPTOMS
SHORTNESS OF BREATH: 0
EYE PAIN: 0
SINUS PRESSURE: 0
SPUTUM PRODUCTION: 0
BACK PAIN: 0
ABDOMINAL PAIN: 0
COUGH: 1
RHINORRHEA: 0
EYE REDNESS: 0
COLOR CHANGE: 0
CHEST TIGHTNESS: 0
WHEEZING: 0
APNEA: 0
NAUSEA: 0
DIFFICULTY BREATHING: 0
EYE ITCHING: 0
DIARRHEA: 0
VOMITING: 0
BLOOD IN STOOL: 0
SORE THROAT: 0
CONSTIPATION: 0

## 2023-03-02 ASSESSMENT — COPD QUESTIONNAIRES: COPD: 1

## 2023-03-02 ASSESSMENT — LIFESTYLE VARIABLES
HOW OFTEN DO YOU HAVE A DRINK CONTAINING ALCOHOL: NEVER
HOW MANY STANDARD DRINKS CONTAINING ALCOHOL DO YOU HAVE ON A TYPICAL DAY: PATIENT DOES NOT DRINK

## 2023-03-02 NOTE — PROGRESS NOTES
Medicare Annual Wellness Visit    Gerard Rangel is here for Medicare AWV, Hypertension, Hyperlipidemia, Fatigue, and COPD    Assessment & Plan   Medicare annual wellness visit, subsequent    Recommendations for Preventive Services Due: see orders and patient instructions/AVS.  Recommended screening schedule for the next 5-10 years is provided to the patient in written form: see Patient Instructions/AVS.     Return for Medicare Annual Wellness Visit in 1 year. Subjective   The following acute and/or chronic problems were also addressed today:  Occasionally feels as though food sticks in throat  COPD-stable  HTN  Lipids  Cough    Patient's complete Health Risk Assessment and screening values have been reviewed and are found in Flowsheets. The following problems were reviewed today and where indicated follow up appointments were made and/or referrals ordered.     Positive Risk Factor Screenings with Interventions:               General HRA Questions:  Select all that apply: (!) Stress    Stress Interventions:  Patient declined any further interventions or treatment       Weight and Activity:  Physical Activity: Inactive    Days of Exercise per Week: 0 days    Minutes of Exercise per Session: 0 min     On average, how many days per week do you engage in moderate to strenuous exercise (like a brisk walk)?: 0 days  Have you lost any weight without trying in the past 3 months?: No  Body mass index: (!) 40.42    Inactivity Interventions:  Patient declined any further interventions or treatment  Obesity Interventions:  Patient declines any further evaluation or treatment         Hearing Screen:  Do you or your family notice any trouble with your hearing that hasn't been managed with hearing aids?: (!) Yes    Interventions:  Patient declines any further evaluation or treatment       Advanced Directives:  Do you have a Living Will?: (!) No    Intervention:  has NO advanced directive - not interested in additional information       CV Risk Counseling:  Patient was asked about her current diet and exercise habits, and personalized advice was provided regarding recommended lifestyle changes. Patient's individual cardiovascular disease risk factors, including advanced age (> 54 for men, > 72 for women), dyslipidemia, hypertension, and obesity (BMI >= 30), were discussed, as well as the likely benefits of lifestyle changes. Based upon patient's motivation to change her behavior, the following plan was agreed upon to work toward lowering cardiovascular disease risk: low saturated fat, low cholesterol diet and increase physical activity, as tolerated. Aspirin use for primary prevention of cardiovascular disease for men 45-79 and women 55-79: Not indicated. Educational materials for lifestyle changes were provided. Patient will follow-up in 6 month(s) with PCP. Provider spent 30 minutes counseling patient. Objective   Vitals:    03/02/23 1424   BP: 126/72   Pulse: 86   Resp: 16   SpO2: 93%   Weight: 221 lb (100.2 kg)   Height: 5' 2\" (1.575 m)      Body mass index is 40.42 kg/m².       General Appearance: alert and oriented to person, place and time, well developed and well- nourished, in no acute distress  Skin: warm and dry, no rash or erythema  Head: normocephalic and atraumatic  Eyes: pupils equal, round, and reactive to light, extraocular eye movements intact, conjunctivae normal  ENT: tympanic membrane, external ear and ear canal normal bilaterally, nose without deformity, nasal mucosa and turbinates normal without polyps  Neck: supple and non-tender without mass, no thyromegaly or thyroid nodules, no cervical lymphadenopathy  Pulmonary/Chest: clear to auscultation bilaterally- no wheezes, rales or rhonchi, normal air movement, no respiratory distress  Cardiovascular: normal rate, regular rhythm, normal S1 and S2, no murmurs, rubs, clicks, or gallops, distal pulses intact, no carotid bruits  Abdomen: soft, non-tender, non-distended, normal bowel sounds, no masses or organomegaly  Extremities: no cyanosis, clubbing or edema  Musculoskeletal: normal range of motion, no joint swelling, deformity or tenderness  Neurologic: reflexes normal and symmetric, no cranial nerve deficit, gait, coordination and speech normal       Allergies   Allergen Reactions    Asa [Aspirin]      History of nose bleed while on ASA required ER visit and packingof the nose     Prior to Visit Medications    Medication Sig Taking?  Authorizing Provider   Budeson-Glycopyrrol-Formoterol 160-9-4.8 MCG/ACT AERO Inhale 2 puffs into the lungs in the morning and at bedtime Yes BRANDON Truong - CNP   carvedilol (COREG) 3.125 MG tablet TAKE ONE TABLET BY MOUTH TWICE A DAY WITH MEALS Yes Navdeep F Massiel, DO   rosuvastatin (CRESTOR) 5 MG tablet TAKE ONE TABLET BY MOUTH EVERY DAY Yes Navdeep F Massiel, DO   fluticasone (FLONASE) 50 MCG/ACT nasal spray 1 spray by Each Nostril route daily Yes Navdeep F Massiel, DO   fluticasone-umeclidin-vilant (TRELEGY ELLIPTA) 100-62.5-25 MCG/INH AEPB Inhale 1 puff into the lungs daily Yes Brandin Lee, DO   albuterol sulfate  (90 Base) MCG/ACT inhaler Inhale 2 puffs into the lungs every 6 hours as needed for Wheezing Yes Navdeep F Massiel, DO   Multiple Vitamins-Minerals (CENTRUM PO) Take by mouth Yes Historical Provider, MD   Cholecalciferol (VITAMIN D3 PO) Take 2,000 Units by mouth  Yes Historical Provider, MD Carney (Including outside providers/suppliers regularly involved in providing care):   Patient Care Team:  Davin Al DO as PCP - General (Family Medicine)  Davin Al DO as PCP - Empaneled Provider  Leopoldo Lory, MD as Surgeon (General Surgery)  Saul Ribera MD as Consulting Physician (Cardiology)     Reviewed and updated this visit:  Tobacco  Allergies  Meds  Problems  Med Hx  Surg Hx  Soc Hx  Fam Hx               Navdeep F Massiel, DO

## 2023-03-02 NOTE — PROGRESS NOTES
Chief Complaint:     Chief Complaint   Patient presents with    Medicare AW    Hypertension    Hyperlipidemia    Fatigue    COPD         Hypertension  This is a chronic problem. The current episode started more than 1 year ago. The problem is unchanged. The problem is controlled. Pertinent negatives include no chest pain, headaches, neck pain, palpitations or shortness of breath. There are no known risk factors for coronary artery disease. Past treatments include beta blockers. The current treatment provides significant improvement. There are no compliance problems. There is no history of CAD/MI, CVA, heart failure, left ventricular hypertrophy or PVD. There is no history of a hypertension causing med, pheochromocytoma, renovascular disease, sleep apnea or a thyroid problem. Hyperlipidemia  This is a chronic problem. The current episode started more than 1 year ago. Exacerbating diseases include obesity. Pertinent negatives include no chest pain, myalgias or shortness of breath. Current antihyperlipidemic treatment includes statins. There are no compliance problems. Risk factors for coronary artery disease include dyslipidemia, hypertension, obesity and post-menopausal.   Fatigue  Associated symptoms include coughing and fatigue. Pertinent negatives include no abdominal pain, arthralgias, chest pain, congestion, fever, headaches, myalgias, nausea, neck pain, numbness, rash, sore throat, vomiting or weakness. COPD  She complains of cough. There is no difficulty breathing, shortness of breath, sputum production or wheezing. This is a new problem. The current episode started more than 1 month ago. The problem occurs daily. The problem has been gradually improving. Associated symptoms include nasal congestion and postnasal drip. Pertinent negatives include no appetite change, chest pain, ear pain, fever, headaches, myalgias, rhinorrhea or sore throat. Her symptoms are aggravated by nothing.  Her symptoms are alleviated by beta-agonist, steroid inhaler and ipratropium. She reports significant improvement on treatment. Risk factors for lung disease include occupational exposure and smoking/tobacco exposure. Her past medical history is significant for COPD. Cough  This is a recurrent problem. The current episode started more than 1 year ago. The problem has been waxing and waning. The cough is Non-productive. Associated symptoms include nasal congestion and postnasal drip. Pertinent negatives include no chest pain, ear pain, eye redness, fever, headaches, myalgias, rash, rhinorrhea, sore throat, shortness of breath or wheezing. Nothing aggravates the symptoms. She has tried leukotriene antagonists for the symptoms. Her past medical history is significant for COPD. There is no history of environmental allergies.      Patient Active Problem List   Diagnosis    Essential hypertension    Mixed hyperlipidemia    Fatigue    Chronic obstructive pulmonary disease, unspecified COPD type ([address]    Seasonal allergies    Vitamin D deficiency       Past Medical History:   Diagnosis Date    Hyperlipidemia     Hypertension        Past Surgical History:   Procedure Laterality Date    BREAST SURGERY Right     lumpectomy    CHOLECYSTECTOMY      COLONOSCOPY  02/20/2017    TUBAL LIGATION         Current Outpatient Medications   Medication Sig Dispense Refill    amoxicillin (AMOXIL) 875 MG tablet Take 1 tablet by mouth 2 times daily for 10 days 20 tablet 0    Budeson-Glycopyrrol-Formoterol 160-9-4.8 MCG/ACT AERO Inhale 2 puffs into the lungs in the morning and at bedtime 10.7 g 5    carvedilol (COREG) 3.125 MG tablet TAKE ONE TABLET BY MOUTH TWICE A DAY WITH MEALS 180 tablet 1    rosuvastatin (CRESTOR) 5 MG tablet TAKE ONE TABLET BY MOUTH EVERY DAY 90 tablet 3    fluticasone (FLONASE) 50 MCG/ACT nasal spray 1 spray by Each Nostril route daily 16 g 2    fluticasone-umeclidin-vilant (TRELEGY ELLIPTA) 100-62.5-25 MCG/INH AEPB Inhale 1 puff into the lungs daily 60 each 12    albuterol sulfate  (90 Base) MCG/ACT inhaler Inhale 2 puffs into the lungs every 6 hours as needed for Wheezing 1 Inhaler 3    Multiple Vitamins-Minerals (CENTRUM PO) Take by mouth      Cholecalciferol (VITAMIN D3 PO) Take 2,000 Units by mouth        No current facility-administered medications for this visit. Allergies   Allergen Reactions    Asa [Aspirin]      History of nose bleed while on ASA required ER visit and packingof the nose       Social History     Socioeconomic History    Marital status:      Spouse name: None    Number of children: None    Years of education: None    Highest education level: None   Occupational History    Occupation: retired- [employer] [address]   Tobacco Use    Smoking status: Never    Smokeless tobacco: Never   Vaping Use    Vaping Use: Never used   Substance and Sexual Activity    Alcohol use: No    Drug use: No    Sexual activity: Not Currently     Social Determinants of Health     Financial Resource Strain: Low Risk     Difficulty of Paying Living Expenses: Not hard at all   Food Insecurity: No Food Insecurity    Worried About Running Out of Food in the Last Year: Never true    Ran Out of Food in the Last Year: Never true   Transportation Needs: Unknown    Lack of Transportation (Non-Medical): Patient refused   Physical Activity: Inactive    Days of Exercise per Week: 0 days    Minutes of Exercise per Session: 0 min   Housing Stability: Unknown    Unstable Housing in the Last Year: Patient refused       Family History   Problem Relation Age of Onset    Heart Attack Father 68        massive          Review of Systems   Constitutional:  Positive for fatigue. Negative for activity change, appetite change and fever. HENT:  Positive for postnasal drip. Negative for congestion, ear pain, hearing loss, nosebleeds, rhinorrhea, sinus pressure and sore throat.     Eyes:  Negative for pain, redness, itching and visual disturbance. Respiratory:  Positive for cough. Negative for apnea, sputum production, chest tightness, shortness of breath and wheezing. Cardiovascular:  Negative for chest pain, palpitations and leg swelling. Gastrointestinal:  Negative for abdominal pain, blood in stool, constipation, diarrhea, nausea and vomiting. Endocrine: Negative. Genitourinary:  Negative for decreased urine volume, difficulty urinating, dysuria, frequency, hematuria and urgency. Musculoskeletal:  Negative for arthralgias, back pain, gait problem, myalgias and neck pain. Skin:  Negative for color change and rash. Allergic/Immunologic: Negative for environmental allergies and food allergies. Neurological:  Negative for dizziness, weakness, light-headedness, numbness and headaches. Hematological:  Negative for adenopathy. Does not bruise/bleed easily. Psychiatric/Behavioral:  Negative for behavioral problems, dysphoric mood and sleep disturbance. The patient is not nervous/anxious and is not hyperactive. All other systems reviewed and are negative. /72   Pulse 86   Resp 16   Ht 5' 2\" (1.575 m)   Wt 221 lb (100.2 kg)   SpO2 93%   BMI 40.42 kg/m²     Physical Exam  Vitals and nursing note reviewed. Constitutional:       General: She is not in acute distress. Appearance: Normal appearance. She is well-developed. HENT:      Head: Normocephalic and atraumatic. Right Ear: Hearing, tympanic membrane and external ear normal. No tenderness. No middle ear effusion. Left Ear: Hearing, tympanic membrane and external ear normal. No tenderness. No middle ear effusion. Nose: Nose normal. No congestion or rhinorrhea. Right Turbinates: Not enlarged. Left Turbinates: Not enlarged. Mouth/Throat:      Mouth: Mucous membranes are moist.      Tongue: No lesions. Pharynx: Oropharynx is clear. No oropharyngeal exudate or posterior oropharyngeal erythema.    Eyes: General: No scleral icterus. Conjunctiva/sclera: Conjunctivae normal.      Pupils: Pupils are equal, round, and reactive to light. Neck:      Thyroid: No thyromegaly. Cardiovascular:      Rate and Rhythm: Normal rate and regular rhythm. Heart sounds: Normal heart sounds. No murmur heard. Pulmonary:      Effort: Pulmonary effort is normal. No respiratory distress. Breath sounds: Normal breath sounds. No wheezing or rales. Abdominal:      General: Bowel sounds are normal. There is no distension. Palpations: Abdomen is soft. Tenderness: There is no abdominal tenderness. Musculoskeletal:         General: No tenderness. Normal range of motion. Cervical back: Normal range of motion and neck supple. No rigidity. No muscular tenderness. Lymphadenopathy:      Cervical: No cervical adenopathy. Skin:     General: Skin is warm and dry. Findings: No erythema or rash. Neurological:      General: No focal deficit present. Mental Status: She is alert and oriented to person, place, and time. Cranial Nerves: No cranial nerve deficit. Deep Tendon Reflexes: Reflexes are normal and symmetric. Reflexes normal.   Psychiatric:         Mood and Affect: Mood normal.                               ASSESSMENT/PLAN:    Patient Active Problem List   Diagnosis    Essential hypertension    Mixed hyperlipidemia    Fatigue    Chronic obstructive pulmonary disease, unspecified COPD type ([facility] [address]    Seasonal allergies    Vitamin D deficiency       PAYAM was seen today for medicare awv, hypertension, hyperlipidemia, fatigue and copd. Diagnoses and all orders for this visit:    Medicare annual wellness visit, subsequent    Essential hypertension  -     Comprehensive Metabolic Panel; Future  -     Lipid Panel; Future  -     TSH; Future    Moderate COPD (chronic obstructive pulmonary disease) (HCC)    Mixed hyperlipidemia  -     Comprehensive Metabolic Panel;  Future  -     Lipid Panel; Future  -     TSH; Future    Cough, unspecified type    Fatigue, unspecified type    Dysphagia, unspecified type    Other orders  -     amoxicillin (AMOXIL) 875 MG tablet; Take 1 tablet by mouth 2 times daily for 10 days    Discussed workup for dysphagia, including UGI series, Swallow study, referral for EGD  She does NOT want to undergo any testing at this time. Follow up with Pulmonology  Labs reviewed:  Lab Results   Component Value Date     02/27/2023    K 4.9 02/27/2023     02/27/2023    CO2 29 02/27/2023    BUN 18 02/27/2023    CREATININE 0.7 02/27/2023    GLUCOSE 93 02/27/2023    CALCIUM 9.8 02/27/2023    PROT 6.7 02/27/2023    LABALBU 3.8 02/27/2023    BILITOT <0.2 02/27/2023    ALKPHOS 82 02/27/2023    AST 19 02/27/2023    ALT 13 02/27/2023    LABGLOM >60 02/27/2023    GFRAA >60 08/18/2022       Lab Results   Component Value Date    TSH 2.070 02/27/2023     Lab Results   Component Value Date    WBC 11.4 02/27/2023    HGB 14.4 02/27/2023    HCT 44.0 02/27/2023    MCV 99.3 02/27/2023     02/27/2023     Lab Results   Component Value Date    CHOL 164 02/27/2023    CHOL 167 08/18/2022    CHOL 158 08/16/2021     Lab Results   Component Value Date    TRIG 248 (H) 02/27/2023    TRIG 123 08/18/2022    TRIG 145 08/16/2021     Lab Results   Component Value Date    HDL 35 02/27/2023    HDL 35 08/18/2022    HDL 39 08/16/2021     Lab Results   Component Value Date    LDLCALC 79 02/27/2023    LDLCALC 107 (H) 08/18/2022    LDLCALC 90 08/16/2021     Lab Results   Component Value Date    LABVLDL 50 02/27/2023    LABVLDL 25 08/18/2022    LABVLDL 29 08/16/2021     No results found for: [facility]  Lab Results   Component Value Date/Time    VLNM86 63 02/27/2023 03:36 PM         Return for Medicare Annual Wellness Visit in 1 year. I spent 30 minutes with this patient. I spent greater than 50% of the time counseling this patient.         Riky Maldonado DO  3/2/2023  2:52 PM

## 2023-03-02 NOTE — PATIENT INSTRUCTIONS
Learning About Stress  What is stress? Stress is what you feel when you have to handle more than you are used to. Stress is a fact of life for most people, and it affects everyone differently. What causes stress for you may not be stressful for someone else. A lot of things can cause stress. You may feel stress when you go on a job interview, take a test, or run a race. This kind of short-term stress is normal and even useful. It can help you if you need to work hard or react quickly. For example, stress can help you finish an important job on time. Stress also can last a long time. Long-term stress is caused by stressful situations or events. Examples of long-term stress include long-term health problems, ongoing problems at work, or conflicts in your family. Long-term stress can harm your health. How does stress affect your health? When you are stressed, your body responds as though you are in danger. It makes hormones that speed up your heart, make you breathe faster, and give you a burst of energy. This is called the fight-or-flight stress response. If the stress is over quickly, your body goes back to normal and no harm is done. But if stress happens too often or lasts too long, it can have bad effects. Long-term stress can make you more likely to get sick, and it can make symptoms of some diseases worse. If you tense up when you are stressed, you may develop neck, shoulder, or low back pain. Stress is linked to high blood pressure and heart disease. Stress also harms your emotional health. It can make you cabrera, tense, or depressed. Your relationships may suffer, and you may not do well at work or school. What can you do to manage stress? How to relax your mind   Write. It may help to write about things that are bothering you. This helps you find out how much stress you feel and what is causing it. When you know this, you can find better ways to cope. Let your feelings out.  Talk, laugh, cry, and express anger when you need to. Talking with friends, family, a counselor, or a member of the clergy about your feelings is a healthy way to relieve stress. Do something you enjoy. For example, listen to music or go to a movie. Practice your hobby or do volunteer work. Meditate. This can help you relax, because you are not worrying about what happened before or what may happen in the future. Do guided imagery. Imagine yourself in any setting that helps you feel calm. You can use audiotapes, books, or a teacher to guide you. How to relax your body   Do something active. Exercise or activity can help reduce stress. Walking is a great way to get started. Even everyday activities such as housecleaning or yard work can help. Do breathing exercises. For example:  From a standing position, bend forward from the waist with your knees slightly bent. Let your arms dangle close to the floor. Breathe in slowly and deeply as you return to a standing position. Roll up slowly and lift your head last.  Hold your breath for just a few seconds in the standing position. Breathe out slowly and bend forward from the waist.  Try yoga or ry chi. These techniques combine exercise and meditation. You may need some training at first to learn them. What can you do to prevent stress? Manage your time. This helps you find time to do the things you want and need to do. Get enough sleep. Your body recovers from the stresses of the day while you are sleeping. Get support. Your family, friends, and community can make a difference in how you experience stress. Where can you learn more? Go to http://www.botello.com/ and enter N032 to learn more about \"Learning About Stress. \"  Current as of: October 6, 2021               Content Version: 13.5  © 8887-0255 Healthwise, [employer]. Care instructions adapted under license by [facility] ([facility]).  If you have questions about a medical condition or this instruction, always ask your healthcare professional. [address] any warranty or liability for your use of this information. Learning About Being Active as an Older Adult  Why is being active important as you get older? Being active is one of the best things you can do for your health. And it's never too late to start. Being active--or getting active, if you aren't already--has definite benefits. It can:  Give you more energy,  Keep your mind sharp. Improve balance to reduce your risk of falls. Help you manage chronic illness with fewer medicines. No matter how old you are, how fit you are, or what health problems you have, there is a form of activity that will work for you. And the more physical activity you can do, the better your overall health will be. What kinds of activity can help you stay healthy? Being more active will make your daily activities easier. Physical activity includes planned exercise and things you do in daily life. There are four types of activity:  Aerobic. Doing aerobic activity makes your heart and lungs strong. Includes walking, dancing, and gardening. Aim for at least 2½ hours spread throughout the week. It improves your energy and can help you sleep better. Muscle-strengthening. This type of activity can help maintain muscle and strengthen bones. Includes climbing stairs, using resistance bands, and lifting or carrying heavy loads. Aim for at least twice a week. It can help protect the knees and other joints. Stretching. Stretching gives you better range of motion in joints and muscles. Includes upper arm stretches, calf stretches, and gentle yoga. Aim for at least twice a week, preferably after your muscles are warmed up from other activities. It can help you function better in daily life. Balancing. This helps you stay coordinated and have good posture. Includes heel-to-toe walking, ry chi, and certain types of yoga.   Aim for at least 3 days a week.  It can reduce your risk of falling. Even if you have a hard time meeting the recommendations, it's better to be more active than less active. All activity done in each category counts toward your weekly total. You'd be surprised how daily things like carrying groceries, keeping up with grandchildren, and taking the stairs can add up. What keeps you from being active? If you've had a hard time being more active, you're not alone. Maybe you remember being able to do more. Or maybe you've never thought of yourself as being active. It's frustrating when you can't do the things you want. Being more active can help. What's holding you back? Getting started. Have a goal, but break it into easy tasks. Small steps build into big accomplishments. Staying motivated. If you feel like skipping your activity, remember your goal. Maybe you want to move better and stay independent. Every activity gets you one step closer. Not feeling your best.  Start with 5 minutes of an activity you enjoy. Prove to yourself you can do it. As you get comfortable, increase your time. You may not be where you want to be. But you're in the process of getting there. Everyone starts somewhere. How can you find safe ways to stay active? Talk with your doctor about any physical challenges you're facing. Make a plan with your doctor if you have a health problem or aren't sure how to get started with activity. If you're already active, ask your doctor if there is anything you should change to stay safe as your body and health change. If you tend to feel dizzy after you take medicine, avoid activity at that time. Try being active before you take your medicine. This will reduce your risk of falls. If you plan to be active at home, make sure to clear your space before you get started. Remove things like TV cords, coffee tables, and throw rugs. It's safest to have plenty of space to move freely.   The key to getting more active is to take it slow and steady. Try to improve only a little bit at a time. Pick just one area to improve on at first. And if an activity hurts, stop and talk to your doctor. Where can you learn more? Go to http://www.botello.com/ and enter P600 to learn more about \"Learning About Being Active as an Older Adult. \"  Current as of: October 10, 2022               Content Version: 13.5  © 2006-2022 [employer]. Care instructions adapted under license by [facility] ([facility]). If you have questions about a medical condition or this instruction, always ask your healthcare professional. [place] [address] any warranty or liability for your use of this information. Advance Directives: Care Instructions  Overview  An advance directive is a legal way to state your wishes at the end of your life. It tells your family and your doctor what to do if you can't say what you want. There are two main types of advance directives. You can change them any time your wishes change. Living will. This form tells your family and your doctor your wishes about life support and other treatment. The form is also called a declaration. Medical power of . This form lets you name a person to make treatment decisions for you when you can't speak for yourself. This person is called a health care agent (health care proxy, health care surrogate). The form is also called a durable power of  for health care. If you do not have an advance directive, decisions about your medical care may be made by a family member, or by a doctor or a  who doesn't know you. It may help to think of an advance directive as a gift to the people who care for you. If you have one, they won't have to make tough decisions by themselves. For more information, including forms for your state, see the [address] website (www.caringinfo.org/planning/advance-directives/).   Follow-up care is a key part of your treatment and safety. Be sure to make and go to all appointments, and call your doctor if you are having problems. It's also a good idea to know your test results and keep a list of the medicines you take. What should you include in an advance directive? Many states have a unique advance directive form. (It may ask you to address specific issues.) Or you might use a universal form that's approved by many states. If your form doesn't tell you what to address, it may be hard to know what to include in your advance directive. Use the questions below to help you get started. Who do you want to make decisions about your medical care if you are not able to? What life-support measures do you want if you have a serious illness that gets worse over time or can't be cured? What are you most afraid of that might happen? (Maybe you're afraid of having pain, losing your independence, or being kept alive by machines.)  Where would you prefer to die? (Your home? A hospital? A nursing home?)  Do you want to donate your organs when you die? Do you want certain Sabianist practices performed before you die? When should you call for help? Be sure to contact your doctor if you have any questions. Where can you learn more? Go to http://www.botello.com/ and enter R264 to learn more about \"Advance Directives: Care Instructions. \"  Current as of: June 16, 2022               Content Version: 13.5  © 6409-9942 Healthwise, [employer]. Care instructions adapted under license by [facility] ([facility]). If you have questions about a medical condition or this instruction, always ask your healthcare professional. [address] any warranty or liability for your use of this information. Starting a Weight Loss Plan: Care Instructions  Overview     If you're thinking about losing weight, it can be hard to know where to start. Your doctor can help you set up a weight loss plan that best meets your needs.  You may want to take a class on nutrition or exercise, or you could join a weight loss support group. If you have questions about how to make changes to your eating or exercise habits, ask your doctor about seeing a registered dietitian or an exercise specialist.  It can be a big challenge to lose weight. But you don't have to make huge changes at once. Make small changes, and stick with them. When those changes become habit, add a few more changes. If you don't think you're ready to make changes right now, try to pick a date in the future. Make an appointment to see your doctor to discuss whether the time is right for you to start a plan. Follow-up care is a key part of your treatment and safety. Be sure to make and go to all appointments, and call your doctor if you are having problems. It's also a good idea to know your test results and keep a list of the medicines you take. How can you care for yourself at home? Set realistic goals. Many people expect to lose much more weight than is likely. A weight loss of 5% to 10% of your body weight may be enough to improve your health. Get family and friends involved to provide support. Talk to them about why you are trying to lose weight, and ask them to help. They can help by participating in exercise and having meals with you, even if they may be eating something different. Find what works best for you. If you do not have time or do not like to cook, a program that offers meal replacement bars or shakes may be better for you. Or if you like to prepare meals, finding a plan that includes daily menus and recipes may be best.  Ask your doctor about other health professionals who can help you achieve your weight loss goals. A dietitian can help you make healthy changes in your diet.   An exercise specialist or  can help you develop a safe and effective exercise program.  A counselor or psychiatrist can help you cope with issues such as depression, anxiety, or family problems that can make it hard to focus on weight loss.  Consider joining a support group for people who are trying to lose weight. Your doctor can suggest groups in your area.  Where can you learn more?  Go to https://www.[employer].net/patientEd and enter U357 to learn more about \"Starting a Weight Loss Plan: Care Instructions.\"  Current as of: August 25, 2022               Content Version: 13.5  © 2006-2022 [employer].   Care instructions adapted under license by [employer]. If you have questions about a medical condition or this instruction, always ask your healthcare professional. [employer] disclaims any warranty or liability for your use of this information.           A Healthy Heart: Care Instructions  Your Care Instructions     Coronary artery disease, also called heart disease, occurs when a substance called plaque builds up in the vessels that supply oxygen-rich blood to your heart muscle. This can narrow the blood vessels and reduce blood flow. A heart attack happens when blood flow is completely blocked. A high-fat diet, smoking, and other factors increase the risk of heart disease.  Your doctor has found that you have a chance of having heart disease. You can do lots of things to keep your heart healthy. It may not be easy, but you can change your diet, exercise more, and quit smoking. These steps really work to lower your chance of heart disease.  Follow-up care is a key part of your treatment and safety. Be sure to make and go to all appointments, and call your doctor if you are having problems. It's also a good idea to know your test results and keep a list of the medicines you take.  How can you care for yourself at home?  Diet    Use less salt when you cook and eat. This helps lower your blood pressure. Taste food before salting. Add only a little salt when you think you need it. With time, your taste buds will adjust to less salt.     Eat fewer snack items,  fast foods, canned soups, and other high-salt, high-fat, processed foods.     Read food labels and try to avoid saturated and trans fats. They increase your risk of heart disease by raising cholesterol levels.     Limit the amount of solid fat-butter, margarine, and shortening-you eat. Use olive, peanut, or canola oil when you cook. Bake, broil, and steam foods instead of frying them.     Eat a variety of fruit and vegetables every day. Dark green, deep orange, red, or yellow fruits and vegetables are especially good for you. Examples include spinach, carrots, peaches, and berries.     Foods high in fiber can reduce your cholesterol and provide important vitamins and minerals. High-fiber foods include whole-grain cereals and breads, oatmeal, beans, brown rice, citrus fruits, and apples.     Eat lean proteins. Heart-healthy proteins include seafood, lean meats and poultry, eggs, beans, peas, nuts, seeds, and soy products.     Limit drinks and foods with added sugar. These include candy, desserts, and soda pop. Lifestyle changes    If your doctor recommends it, get more exercise. Walking is a good choice. Bit by bit, increase the amount you walk every day. Try for at least 30 minutes on most days of the week. You also may want to swim, bike, or do other activities.     Do not smoke. If you need help quitting, talk to your doctor about stop-smoking programs and medicines. These can increase your chances of quitting for good. Quitting smoking may be the most important step you can take to protect your heart. It is never too late to quit.     Limit alcohol to 2 drinks a day for men and 1 drink a day for women. Too much alcohol can cause health problems.     Manage other health problems such as diabetes, high blood pressure, and high cholesterol. If you think you may have a problem with alcohol or drug use, talk to your doctor. Medicines    Take your medicines exactly as prescribed.  Call your doctor if you think you are having a problem with your medicine.     If your doctor recommends aspirin, take the amount directed each day. Make sure you take aspirin and not another kind of pain reliever, such as acetaminophen (Tylenol).   When should you call for help?   Call 911 if you have symptoms of a heart attack. These may include:    Chest pain or pressure, or a strange feeling in the chest.     Sweating.     Shortness of breath.     Pain, pressure, or a strange feeling in the back, neck, jaw, or upper belly or in one or both shoulders or arms.     Lightheadedness or sudden weakness.     A fast or irregular heartbeat.   After you call 911, the  may tell you to chew 1 adult-strength or 2 to 4 low-dose aspirin. Wait for an ambulance. Do not try to drive yourself.  Watch closely for changes in your health, and be sure to contact your doctor if you have any problems.  Where can you learn more?  Go to https://www.[employer].net/patientEd and enter F075 to learn more about \"A Healthy Heart: Care Instructions.\"  Current as of: September 7, 2022               Content Version: 13.5  © 9621-4923 [employer].   Care instructions adapted under license by [employer]. If you have questions about a medical condition or this instruction, always ask your healthcare professional. [employer] disclaims any warranty or liability for your use of this information.      Personalized Preventive Plan for Jayne Weaver - 3/2/2023  Medicare offers a range of preventive health benefits. Some of the tests and screenings are paid in full while other may be subject to a deductible, co-insurance, and/or copay.    Some of these benefits include a comprehensive review of your medical history including lifestyle, illnesses that may run in your family, and various assessments and screenings as appropriate.    After reviewing your medical record and screening and assessments performed today your provider may have ordered  immunizations, labs, imaging, and/or referrals for you. A list of these orders (if applicable) as well as your Preventive Care list are included within your After Visit Summary for your review. Other Preventive Recommendations:    A preventive eye exam performed by an eye specialist is recommended every 1-2 years to screen for glaucoma; cataracts, macular degeneration, and other eye disorders. A preventive dental visit is recommended every 6 months. Try to get at least 150 minutes of exercise per week or 10,000 steps per day on a pedometer . Order or download the FREE \"Exercise & Physical Activity: Your Everyday Guide\" from The [employer] on Aging. Call 5-975.648.5715 or search The [employer] on Aging online. You need 2277-9932 mg of calcium and 9314-0223 IU of vitamin D per day. It is possible to meet your calcium requirement with diet alone, but a vitamin D supplement is usually necessary to meet this goal.  When exposed to the sun, use a sunscreen that protects against both UVA and UVB radiation with an SPF of 30 or greater. Reapply every 2 to 3 hours or after sweating, drying off with a towel, or swimming. Always wear a seat belt when traveling in a car. Always wear a helmet when riding a bicycle or motorcycle.

## 2023-04-19 DIAGNOSIS — I10 ESSENTIAL HYPERTENSION: ICD-10-CM

## 2023-04-19 RX ORDER — CARVEDILOL 3.12 MG/1
TABLET ORAL
Qty: 180 TABLET | Refills: 1 | Status: SHIPPED | OUTPATIENT
Start: 2023-04-19

## 2023-06-12 DIAGNOSIS — R53.83 FATIGUE, UNSPECIFIED TYPE: ICD-10-CM

## 2023-06-12 LAB
ALBUMIN SERPL-MCNC: 4.1 G/DL (ref 3.5–5.2)
ALP SERPL-CCNC: 72 U/L (ref 35–104)
ALT SERPL-CCNC: 12 U/L (ref 0–32)
ANION GAP SERPL CALCULATED.3IONS-SCNC: 14 MMOL/L (ref 7–16)
AST SERPL-CCNC: 16 U/L (ref 0–31)
BASOPHILS # BLD: 0.03 E9/L (ref 0–0.2)
BASOPHILS NFR BLD: 0.2 % (ref 0–2)
BILIRUB SERPL-MCNC: 0.5 MG/DL (ref 0–1.2)
BUN SERPL-MCNC: 18 MG/DL (ref 6–23)
CALCIUM SERPL-MCNC: 9.7 MG/DL (ref 8.6–10.2)
CHLORIDE SERPL-SCNC: 100 MMOL/L (ref 98–107)
CO2 SERPL-SCNC: 26 MMOL/L (ref 22–29)
CREAT SERPL-MCNC: 0.7 MG/DL (ref 0.5–1)
EOSINOPHIL # BLD: 0.1 E9/L (ref 0.05–0.5)
EOSINOPHIL NFR BLD: 0.7 % (ref 0–6)
ERYTHROCYTE [DISTWIDTH] IN BLOOD BY AUTOMATED COUNT: 12.7 FL (ref 11.5–15)
GLUCOSE SERPL-MCNC: 99 MG/DL (ref 74–99)
HCT VFR BLD AUTO: 45.6 % (ref 34–48)
HGB BLD-MCNC: 14.7 G/DL (ref 11.5–15.5)
IMM GRANULOCYTES # BLD: 0.09 E9/L
IMM GRANULOCYTES NFR BLD: 0.6 % (ref 0–5)
LIPASE: 20 U/L (ref 13–60)
LYMPHOCYTES # BLD: 2.19 E9/L (ref 1.5–4)
LYMPHOCYTES NFR BLD: 14.9 % (ref 20–42)
MAGNESIUM SERPL-MCNC: 2.3 MG/DL (ref 1.6–2.6)
MCH RBC QN AUTO: 31.7 PG (ref 26–35)
MCHC RBC AUTO-ENTMCNC: 32.2 % (ref 32–34.5)
MCV RBC AUTO: 98.3 FL (ref 80–99.9)
MONOCYTES # BLD: 1.1 E9/L (ref 0.1–0.95)
MONOCYTES NFR BLD: 7.5 % (ref 2–12)
NEUTROPHILS # BLD: 11.22 E9/L (ref 1.8–7.3)
NEUTS SEG NFR BLD: 76.1 % (ref 43–80)
PLATELET # BLD AUTO: 258 E9/L (ref 130–450)
PMV BLD AUTO: 11.1 FL (ref 7–12)
POTASSIUM SERPL-SCNC: 4.6 MMOL/L (ref 3.5–5)
PROT SERPL-MCNC: 7.3 G/DL (ref 6.4–8.3)
RBC # BLD AUTO: 4.64 E12/L (ref 3.5–5.5)
SODIUM SERPL-SCNC: 140 MMOL/L (ref 132–146)
TSH SERPL-MCNC: 2.03 UIU/ML (ref 0.27–4.2)
WBC # BLD: 14.7 E9/L (ref 4.5–11.5)

## 2023-06-12 NOTE — RESULT ENCOUNTER NOTE
Laboratory studies show a significantly elevated white blood cell count of 14.7. This may be reflective of infection or stress on the body. The hemoglobin hematocrit are stable. The remainder of the labs are all within normal limits and mono seems to be pending at this time. If the patient is not feeling any significantly better I would have the patient go directly to the emergency department for further work-up.

## 2023-06-13 LAB — EBV VCA AB SER QL: NEGATIVE

## 2023-07-11 ENCOUNTER — TELEPHONE (OUTPATIENT)
Dept: PRIMARY CARE CLINIC | Age: 83
End: 2023-07-11

## 2023-07-11 DIAGNOSIS — D22.5 ATYPICAL NEVUS OF BACK: Primary | ICD-10-CM

## 2023-07-11 NOTE — TELEPHONE ENCOUNTER
Patient calling for referral to Dr. Tomas Fajardo. Patient wanting to see her for mole check.    Patient did schedule appointment 8/14, but when she called her insurance, they do require a referral.

## 2023-08-08 ENCOUNTER — OFFICE VISIT (OUTPATIENT)
Dept: PRIMARY CARE CLINIC | Age: 83
End: 2023-08-08
Payer: MEDICARE

## 2023-08-08 VITALS
HEART RATE: 74 BPM | DIASTOLIC BLOOD PRESSURE: 70 MMHG | SYSTOLIC BLOOD PRESSURE: 130 MMHG | HEIGHT: 61 IN | TEMPERATURE: 97.1 F | BODY MASS INDEX: 41.35 KG/M2 | RESPIRATION RATE: 16 BRPM | WEIGHT: 219 LBS | OXYGEN SATURATION: 98 %

## 2023-08-08 DIAGNOSIS — E78.2 MIXED HYPERLIPIDEMIA: ICD-10-CM

## 2023-08-08 DIAGNOSIS — I10 ESSENTIAL HYPERTENSION: ICD-10-CM

## 2023-08-08 DIAGNOSIS — J44.9 CHRONIC OBSTRUCTIVE PULMONARY DISEASE, UNSPECIFIED COPD TYPE (HCC): ICD-10-CM

## 2023-08-08 DIAGNOSIS — R53.83 FATIGUE, UNSPECIFIED TYPE: ICD-10-CM

## 2023-08-08 DIAGNOSIS — J30.2 SEASONAL ALLERGIES: ICD-10-CM

## 2023-08-08 DIAGNOSIS — R06.09 DYSPNEA ON EXERTION: ICD-10-CM

## 2023-08-08 DIAGNOSIS — E55.9 VITAMIN D DEFICIENCY: ICD-10-CM

## 2023-08-08 DIAGNOSIS — R53.83 FATIGUE, UNSPECIFIED TYPE: Primary | ICD-10-CM

## 2023-08-08 LAB
HCT VFR BLD CALC: 46.6 % (ref 34–48)
HEMOGLOBIN: 14.6 G/DL (ref 11.5–15.5)
MCH RBC QN AUTO: 31.6 PG (ref 26–35)
MCHC RBC AUTO-ENTMCNC: 31.3 G/DL (ref 32–34.5)
MCV RBC AUTO: 100.9 FL (ref 80–99.9)
PDW BLD-RTO: 12.7 % (ref 11.5–15)
PLATELET # BLD: 276 K/UL (ref 130–450)
PMV BLD AUTO: 11.4 FL (ref 7–12)
RBC # BLD: 4.62 M/UL (ref 3.5–5.5)
WBC # BLD: 12.6 K/UL (ref 4.5–11.5)

## 2023-08-08 PROCEDURE — 1123F ACP DISCUSS/DSCN MKR DOCD: CPT | Performed by: FAMILY MEDICINE

## 2023-08-08 PROCEDURE — 99214 OFFICE O/P EST MOD 30 MIN: CPT | Performed by: FAMILY MEDICINE

## 2023-08-08 PROCEDURE — 3078F DIAST BP <80 MM HG: CPT | Performed by: FAMILY MEDICINE

## 2023-08-08 PROCEDURE — 3075F SYST BP GE 130 - 139MM HG: CPT | Performed by: FAMILY MEDICINE

## 2023-08-08 PROCEDURE — 93000 ELECTROCARDIOGRAM COMPLETE: CPT | Performed by: FAMILY MEDICINE

## 2023-08-08 ASSESSMENT — ENCOUNTER SYMPTOMS
DIARRHEA: 0
COLOR CHANGE: 0
CONSTIPATION: 0
EYE ITCHING: 0
WHEEZING: 0
VOMITING: 0
SHORTNESS OF BREATH: 0
SORE THROAT: 0
RHINORRHEA: 0
SINUS PRESSURE: 0
CHEST TIGHTNESS: 0
BACK PAIN: 0
BLOOD IN STOOL: 0
EYE PAIN: 0
SPUTUM PRODUCTION: 0
NAUSEA: 0
APNEA: 0
EYE REDNESS: 0
ABDOMINAL PAIN: 0
DIFFICULTY BREATHING: 0
COUGH: 1

## 2023-08-08 ASSESSMENT — COPD QUESTIONNAIRES: COPD: 1

## 2023-08-09 ENCOUNTER — TELEPHONE (OUTPATIENT)
Dept: ADMINISTRATIVE | Age: 83
End: 2023-08-09

## 2023-08-09 DIAGNOSIS — R06.09 DYSPNEA ON EXERTION: Primary | ICD-10-CM

## 2023-08-09 LAB
ALBUMIN SERPL-MCNC: 4 G/DL (ref 3.5–5.2)
ALP BLD-CCNC: 66 U/L (ref 35–104)
ALT SERPL-CCNC: 15 U/L (ref 0–32)
ANION GAP SERPL CALCULATED.3IONS-SCNC: 18 MMOL/L (ref 7–16)
AST SERPL-CCNC: 21 U/L (ref 0–31)
BILIRUB SERPL-MCNC: 0.4 MG/DL (ref 0–1.2)
BUN BLDV-MCNC: 15 MG/DL (ref 6–23)
CALCIUM SERPL-MCNC: 9.6 MG/DL (ref 8.6–10.2)
CHLORIDE BLD-SCNC: 99 MMOL/L (ref 98–107)
CHOLESTEROL: 164 MG/DL
CO2: 24 MMOL/L (ref 22–29)
CREAT SERPL-MCNC: 0.7 MG/DL (ref 0.5–1)
GFR SERPL CREATININE-BSD FRML MDRD: >60 ML/MIN/1.73M2
GLUCOSE BLD-MCNC: 83 MG/DL (ref 74–99)
HDLC SERPL-MCNC: 35 MG/DL
LDL CHOLESTEROL: 96 MG/DL
POTASSIUM SERPL-SCNC: 4.9 MMOL/L (ref 3.5–5)
SODIUM BLD-SCNC: 141 MMOL/L (ref 132–146)
TOTAL PROTEIN: 7 G/DL (ref 6.4–8.3)
TRIGL SERPL-MCNC: 167 MG/DL
TSH SERPL DL<=0.05 MIU/L-ACNC: 1.58 UIU/ML (ref 0.27–4.2)
VLDLC SERPL CALC-MCNC: 33 MG/DL

## 2023-08-09 NOTE — TELEPHONE ENCOUNTER
NP scheduled from the [facility]. Patient Appointment Form:      PCP: Dr. Ney Flores  Referring: Dr. Ney Flores    Has the Patient:    Seen a Cardiologist? Yes Dr. Elias Grimaldo     Had a heart catheterization? no    Had heart surgery? no    Had a stress test or nuclear stress test? Yes 2020 [facility]     Had an echocardiogram? Yes 12/4/20 Epic and 3/13/20 [facility]     Had a vascular ultrasound? no    Had a 24/48 heart monitor or extended cardiac event monitor? no    Had recent blood work in the last 6 months?      Had a pacemaker/ICD/ILR implant? no    Seen an Electrophysiologist? no        Will send records via: 2020 [facility] in [facility]       Date & time of appointment:  8/11/23 @ 8:00 with Dr. Lazaro Rivas

## 2023-08-11 ENCOUNTER — OFFICE VISIT (OUTPATIENT)
Dept: CARDIOLOGY CLINIC | Age: 83
End: 2023-08-11
Payer: MEDICARE

## 2023-08-11 VITALS
SYSTOLIC BLOOD PRESSURE: 112 MMHG | WEIGHT: 219 LBS | HEIGHT: 61 IN | RESPIRATION RATE: 18 BRPM | BODY MASS INDEX: 41.35 KG/M2 | HEART RATE: 75 BPM | DIASTOLIC BLOOD PRESSURE: 88 MMHG

## 2023-08-11 DIAGNOSIS — I10 PRIMARY HYPERTENSION: ICD-10-CM

## 2023-08-11 DIAGNOSIS — I35.0 NONRHEUMATIC AORTIC VALVE STENOSIS: ICD-10-CM

## 2023-08-11 DIAGNOSIS — R06.09 DOE (DYSPNEA ON EXERTION): Primary | ICD-10-CM

## 2023-08-11 PROCEDURE — 3079F DIAST BP 80-89 MM HG: CPT | Performed by: INTERNAL MEDICINE

## 2023-08-11 PROCEDURE — 99204 OFFICE O/P NEW MOD 45 MIN: CPT | Performed by: INTERNAL MEDICINE

## 2023-08-11 PROCEDURE — 1123F ACP DISCUSS/DSCN MKR DOCD: CPT | Performed by: INTERNAL MEDICINE

## 2023-08-11 PROCEDURE — 93000 ELECTROCARDIOGRAM COMPLETE: CPT | Performed by: INTERNAL MEDICINE

## 2023-08-11 PROCEDURE — 3074F SYST BP LT 130 MM HG: CPT | Performed by: INTERNAL MEDICINE

## 2023-08-11 NOTE — PROGRESS NOTES
Occupational History    Occupation: retired- [employer]   Tobacco Use    Smoking status: Never    Smokeless tobacco: Never   Vaping Use    Vaping Use: Never used   Substance and Sexual Activity    Alcohol use: No    Drug use: No    Sexual activity: Not Currently   Other Topics Concern    Not on file   Social History Narrative    Not on file     Social Determinants of Health     Financial Resource Strain: Low Risk     Difficulty of Paying Living Expenses: Not hard at all   Food Insecurity: No Food Insecurity    Worried About [place] in the Last Year: Never true    [address] in the Last Year: Never true   Transportation Needs: Unknown    Lack of Transportation (Medical): Not on file    Lack of Transportation (Non-Medical): Patient refused   Physical Activity: Inactive    Days of Exercise per Week: 0 days    Minutes of Exercise per Session: 0 min   Stress: Not on file   Social Connections: Not on file   Intimate Partner Violence: Not on file   Housing Stability: Unknown    Unable to Pay for Housing in the Last Year: Not on file    Number of [address] in the Last Year: Not on file    Unstable Housing in the Last Year: Patient refused       Family History   Problem Relation Age of Onset    Heart Attack Father 68        massive       Past Medical History:   Diagnosis Date    Hyperlipidemia     Hypertension        PHYSICAL EXAM:  CONSTITUTIONAL:  Well developed, well nourished    Vitals:    08/11/23 0815   BP: 112/88   Pulse: 75   Resp: 18   Weight: 219 lb (99.3 kg)   Height: 5' 1\" (1.549 m)     HEAD & FACE: Normocephalic. Symmetric. EYES: No xanthelasma. Conjunctivae not injected. EARS, NOSE, MOUTH & THROAT: Good dentition. No oral pallor or cyanosis. NECK: No JVD at 30 degrees. No thyromegaly. RESPIRATORY: Clear to auscultation and percussion in all fields. No use of accessory muscle or intercostal retractions. CARDIOVASCULAR: Regular rate and rhythm.

## 2023-09-07 ENCOUNTER — OFFICE VISIT (OUTPATIENT)
Dept: PRIMARY CARE CLINIC | Age: 83
End: 2023-09-07
Payer: MEDICARE

## 2023-09-07 VITALS
WEIGHT: 221 LBS | HEIGHT: 61 IN | DIASTOLIC BLOOD PRESSURE: 82 MMHG | RESPIRATION RATE: 16 BRPM | SYSTOLIC BLOOD PRESSURE: 138 MMHG | BODY MASS INDEX: 41.72 KG/M2 | OXYGEN SATURATION: 98 % | HEART RATE: 75 BPM

## 2023-09-07 DIAGNOSIS — R53.83 FATIGUE, UNSPECIFIED TYPE: ICD-10-CM

## 2023-09-07 DIAGNOSIS — E55.9 VITAMIN D DEFICIENCY: ICD-10-CM

## 2023-09-07 DIAGNOSIS — J44.9 CHRONIC OBSTRUCTIVE PULMONARY DISEASE, UNSPECIFIED COPD TYPE (HCC): ICD-10-CM

## 2023-09-07 DIAGNOSIS — I10 ESSENTIAL HYPERTENSION: Primary | ICD-10-CM

## 2023-09-07 DIAGNOSIS — E78.2 MIXED HYPERLIPIDEMIA: ICD-10-CM

## 2023-09-07 DIAGNOSIS — I38 VALVULAR HEART DISEASE: ICD-10-CM

## 2023-09-07 PROCEDURE — 3079F DIAST BP 80-89 MM HG: CPT | Performed by: FAMILY MEDICINE

## 2023-09-07 PROCEDURE — 99214 OFFICE O/P EST MOD 30 MIN: CPT | Performed by: FAMILY MEDICINE

## 2023-09-07 PROCEDURE — 1123F ACP DISCUSS/DSCN MKR DOCD: CPT | Performed by: FAMILY MEDICINE

## 2023-09-07 PROCEDURE — 3075F SYST BP GE 130 - 139MM HG: CPT | Performed by: FAMILY MEDICINE

## 2023-09-07 ASSESSMENT — ENCOUNTER SYMPTOMS
NAUSEA: 0
EYE PAIN: 0
SINUS PRESSURE: 0
CHEST TIGHTNESS: 0
COUGH: 1
CONSTIPATION: 0
EYE ITCHING: 0
ABDOMINAL PAIN: 0
EYE REDNESS: 0
SPUTUM PRODUCTION: 0
RHINORRHEA: 0
WHEEZING: 0
BLOOD IN STOOL: 0
APNEA: 0
COLOR CHANGE: 0
SHORTNESS OF BREATH: 0
VOMITING: 0
DIARRHEA: 0
SORE THROAT: 0
BACK PAIN: 0
DIFFICULTY BREATHING: 0

## 2023-09-07 ASSESSMENT — COPD QUESTIONNAIRES: COPD: 1

## 2023-09-07 NOTE — PROGRESS NOTES
Chief Complaint:     Chief Complaint   Patient presents with    Hypertension    Hyperlipidemia    Other     Saw cardiology         Hypertension  This is a chronic problem. The current episode started more than 1 year ago. The problem is unchanged. The problem is controlled. Pertinent negatives include no chest pain, headaches, neck pain, palpitations or shortness of breath. There are no known risk factors for coronary artery disease. Past treatments include beta blockers. The current treatment provides significant improvement. There are no compliance problems. There is no history of CAD/MI, CVA, heart failure, left ventricular hypertrophy or PVD. There is no history of a hypertension causing med, pheochromocytoma, renovascular disease, sleep apnea or a thyroid problem. Hyperlipidemia  This is a chronic problem. The current episode started more than 1 year ago. Exacerbating diseases include obesity. Pertinent negatives include no chest pain, myalgias or shortness of breath. Current antihyperlipidemic treatment includes statins. There are no compliance problems. Risk factors for coronary artery disease include dyslipidemia, hypertension, obesity and post-menopausal.   COPD  She complains of cough. There is no difficulty breathing, shortness of breath, sputum production or wheezing. This is a new problem. The current episode started more than 1 month ago. The problem occurs daily. The problem has been gradually improving. Associated symptoms include nasal congestion and postnasal drip. Pertinent negatives include no appetite change, chest pain, ear pain, fever, headaches, myalgias, rhinorrhea or sore throat. Her symptoms are aggravated by nothing. Her symptoms are alleviated by beta-agonist, steroid inhaler and ipratropium. She reports significant improvement on treatment. Risk factors for lung disease include occupational exposure and smoking/tobacco exposure. Her past medical history is significant for COPD.

## 2023-09-21 ENCOUNTER — HOSPITAL ENCOUNTER (OUTPATIENT)
Dept: CARDIOLOGY | Age: 83
Discharge: HOME | End: 2023-09-21
Payer: MEDICARE

## 2023-09-21 DIAGNOSIS — I35.0 NONRHEUMATIC AORTIC VALVE STENOSIS: ICD-10-CM

## 2023-09-21 PROCEDURE — 93306 TTE W/DOPPLER COMPLETE: CPT | Performed by: PSYCHIATRY & NEUROLOGY

## 2023-09-25 ENCOUNTER — TELEPHONE (OUTPATIENT)
Dept: CARDIOLOGY CLINIC | Age: 83
End: 2023-09-25

## 2023-09-25 DIAGNOSIS — I38 VALVULAR HEART DISEASE: Primary | ICD-10-CM

## 2023-09-25 DIAGNOSIS — R06.02 SOB (SHORTNESS OF BREATH): ICD-10-CM

## 2023-09-25 RX ORDER — REGADENOSON 0.08 MG/ML
0.4 INJECTION, SOLUTION INTRAVENOUS
OUTPATIENT
Start: 2023-09-25

## 2023-10-12 ENCOUNTER — TELEPHONE (OUTPATIENT)
Dept: CARDIOLOGY | Age: 83
End: 2023-10-12

## 2023-10-16 ENCOUNTER — HOSPITAL ENCOUNTER (OUTPATIENT)
Dept: CARDIOLOGY | Age: 83
Discharge: HOME | End: 2023-10-18
Payer: MEDICARE

## 2023-10-16 VITALS
WEIGHT: 220 LBS | RESPIRATION RATE: 18 BRPM | SYSTOLIC BLOOD PRESSURE: 142 MMHG | DIASTOLIC BLOOD PRESSURE: 62 MMHG | HEART RATE: 62 BPM | HEIGHT: 61 IN | BODY MASS INDEX: 41.54 KG/M2

## 2023-10-16 DIAGNOSIS — I38 VALVULAR HEART DISEASE: ICD-10-CM

## 2023-10-16 DIAGNOSIS — R06.02 SOB (SHORTNESS OF BREATH): ICD-10-CM

## 2023-10-16 LAB — ECHO BSA: 2.07 M2

## 2023-10-16 PROCEDURE — 2580000003 HC RX 258: Performed by: INTERNAL MEDICINE

## 2023-10-16 PROCEDURE — 93017 CV STRESS TEST TRACING ONLY: CPT

## 2023-10-16 PROCEDURE — 3430000000 HC RX DIAGNOSTIC RADIOPHARMACEUTICAL: Performed by: INTERNAL MEDICINE

## 2023-10-16 PROCEDURE — 6360000002 HC RX W HCPCS: Performed by: INTERNAL MEDICINE

## 2023-10-16 PROCEDURE — A9500 TC99M SESTAMIBI: HCPCS | Performed by: INTERNAL MEDICINE

## 2023-10-16 PROCEDURE — 78452 HT MUSCLE IMAGE SPECT MULT: CPT

## 2023-10-16 RX ORDER — SODIUM CHLORIDE 0.9 % (FLUSH) 0.9 %
10 SYRINGE (ML) INJECTION PRN
Status: DISCONTINUED | OUTPATIENT
Start: 2023-10-16 | End: 2023-10-19 | Stop reason: HOSPADM

## 2023-10-16 RX ORDER — REGADENOSON 0.08 MG/ML
0.4 INJECTION, SOLUTION INTRAVENOUS
Status: COMPLETED | OUTPATIENT
Start: 2023-10-16 | End: 2023-10-16

## 2023-10-16 RX ORDER — TETRAKIS(2-METHOXYISOBUTYLISOCYANIDE)COPPER(I) TETRAFLUOROBORATE 1 MG/ML
31.7 INJECTION, POWDER, LYOPHILIZED, FOR SOLUTION INTRAVENOUS
Status: COMPLETED | OUTPATIENT
Start: 2023-10-16 | End: 2023-10-16

## 2023-10-16 RX ORDER — TETRAKIS(2-METHOXYISOBUTYLISOCYANIDE)COPPER(I) TETRAFLUOROBORATE 1 MG/ML
9.7 INJECTION, POWDER, LYOPHILIZED, FOR SOLUTION INTRAVENOUS
Status: COMPLETED | OUTPATIENT
Start: 2023-10-16 | End: 2023-10-16

## 2023-10-16 RX ADMIN — Medication 31.7 MILLICURIE: at 10:00

## 2023-10-16 RX ADMIN — SODIUM CHLORIDE, PRESERVATIVE FREE 10 ML: 5 INJECTION INTRAVENOUS at 10:01

## 2023-10-16 RX ADMIN — REGADENOSON 0.4 MG: 0.08 INJECTION, SOLUTION INTRAVENOUS at 10:00

## 2023-10-16 RX ADMIN — Medication 9.7 MILLICURIE: at 07:58

## 2023-10-16 RX ADMIN — SODIUM CHLORIDE, PRESERVATIVE FREE 10 ML: 5 INJECTION INTRAVENOUS at 10:00

## 2023-10-16 RX ADMIN — SODIUM CHLORIDE, PRESERVATIVE FREE 10 ML: 5 INJECTION INTRAVENOUS at 07:58

## 2023-10-16 NOTE — PROCEDURES
vertical long, and horizontal long axis demonstrated normal homogeneous tracer distribution throughout the myocardium. The resting images show no change. Gated SPECT left ventricular ejection fraction was calculated to be 75%, with normal myocardial thickening and wall motion. Impression:    ECG during the infusion did not change. The myocardial perfusion imaging was normal.    Overall left ventricular systolic function was normal without regional wall motion abnormalities. Low risk general pharmacologic nuclear stress test.    Thank you for sending your patient to this [facility].      Electronically signed by Marilyn Kenny MD on 10/16/23 at 12:28 PM EDT

## 2023-10-16 NOTE — DISCHARGE INSTRUCTIONS
[facility]      Instructions to Patients    The following are the instructions for patients who have had a procedure in our office today. Patient name: Carolyn Dumont    Radionuclide Activity: 40mCi of 99mTc-Sestamibi    Date Administered: 10/16/2023    Expires: 48 hours after scheduled appointment time      Patient may resume normal activity unless otherwise instructed. Patient may resume medications as normal.  If the need should arise, patient may call [phone] between the hours of 8:00am-5:00pm.  After hours there is at least one physician on-call at all times for those patients needing assistance. Patients may call [phone] and the answering service will direct the patient to one of our physicians for assistance. After the patient's test if they are going to be leaving from an airport in the near future they should take this letter with them to verify the test and radionuclide used for their test.      This letter verifies that the above named bearer received an injection of a radionuclide for medical purpose/usage only.         Electronically signed by Ilsa Oneal on 10/16/2023 at 9:59 AM

## 2023-10-17 ENCOUNTER — TELEPHONE (OUTPATIENT)
Dept: CARDIOLOGY CLINIC | Age: 83
End: 2023-10-17

## 2023-10-17 NOTE — TELEPHONE ENCOUNTER
----- Message from Ellis Qureshi MD sent at 10/16/2023  4:06 PM EDT -----  Please let patient know that stress test was normal.  Cardiac screening tests do not suggest a cardiology cause of her shortness of breath. Follow-up in 1 year.

## 2023-10-28 DIAGNOSIS — I10 ESSENTIAL HYPERTENSION: ICD-10-CM

## 2023-10-30 RX ORDER — CARVEDILOL 3.12 MG/1
TABLET ORAL
Qty: 180 TABLET | Refills: 1 | Status: SHIPPED | OUTPATIENT
Start: 2023-10-30

## 2024-01-22 ENCOUNTER — OFFICE VISIT (OUTPATIENT)
Dept: PRIMARY CARE CLINIC | Age: 84
End: 2024-01-22
Payer: MEDICARE

## 2024-01-22 VITALS
OXYGEN SATURATION: 94 % | WEIGHT: 211 LBS | HEART RATE: 77 BPM | SYSTOLIC BLOOD PRESSURE: 132 MMHG | TEMPERATURE: 96.8 F | BODY MASS INDEX: 39.84 KG/M2 | RESPIRATION RATE: 16 BRPM | HEIGHT: 61 IN | DIASTOLIC BLOOD PRESSURE: 68 MMHG

## 2024-01-22 DIAGNOSIS — R53.83 FATIGUE, UNSPECIFIED TYPE: ICD-10-CM

## 2024-01-22 DIAGNOSIS — R42 VERTIGO: ICD-10-CM

## 2024-01-22 DIAGNOSIS — J44.9 MODERATE COPD (CHRONIC OBSTRUCTIVE PULMONARY DISEASE) (HCC): ICD-10-CM

## 2024-01-22 DIAGNOSIS — H69.93 DYSFUNCTION OF BOTH EUSTACHIAN TUBES: ICD-10-CM

## 2024-01-22 DIAGNOSIS — D71 GRANULOMATOUS DISEASE (HCC): ICD-10-CM

## 2024-01-22 DIAGNOSIS — B96.89 ACUTE BACTERIAL SINUSITIS: Primary | ICD-10-CM

## 2024-01-22 DIAGNOSIS — J01.90 ACUTE BACTERIAL SINUSITIS: Primary | ICD-10-CM

## 2024-01-22 LAB
ALBUMIN SERPL-MCNC: 4.1 G/DL (ref 3.5–5.2)
ALP BLD-CCNC: 65 U/L (ref 35–104)
ALT SERPL-CCNC: 16 U/L (ref 0–32)
ANION GAP SERPL CALCULATED.3IONS-SCNC: 10 MMOL/L (ref 7–16)
AST SERPL-CCNC: 23 U/L (ref 0–31)
BILIRUB SERPL-MCNC: 0.5 MG/DL (ref 0–1.2)
BUN BLDV-MCNC: 18 MG/DL (ref 6–23)
CALCIUM SERPL-MCNC: 9.9 MG/DL (ref 8.6–10.2)
CHLORIDE BLD-SCNC: 99 MMOL/L (ref 98–107)
CO2: 28 MMOL/L (ref 22–29)
CREAT SERPL-MCNC: 0.8 MG/DL (ref 0.5–1)
GFR SERPL CREATININE-BSD FRML MDRD: >60 ML/MIN/1.73M2
GLUCOSE BLD-MCNC: 86 MG/DL (ref 74–99)
HCT VFR BLD CALC: 49.6 % (ref 34–48)
HEMOGLOBIN: 15.9 G/DL (ref 11.5–15.5)
MCH RBC QN AUTO: 31.7 PG (ref 26–35)
MCHC RBC AUTO-ENTMCNC: 32.1 G/DL (ref 32–34.5)
MCV RBC AUTO: 99 FL (ref 80–99.9)
PDW BLD-RTO: 12.9 % (ref 11.5–15)
PLATELET # BLD: 347 K/UL (ref 130–450)
PMV BLD AUTO: 11.2 FL (ref 7–12)
POTASSIUM SERPL-SCNC: 4.7 MMOL/L (ref 3.5–5)
RBC # BLD: 5.01 M/UL (ref 3.5–5.5)
SODIUM BLD-SCNC: 137 MMOL/L (ref 132–146)
TOTAL PROTEIN: 7.5 G/DL (ref 6.4–8.3)
WBC # BLD: 13.4 K/UL (ref 4.5–11.5)

## 2024-01-22 PROCEDURE — 99214 OFFICE O/P EST MOD 30 MIN: CPT | Performed by: FAMILY MEDICINE

## 2024-01-22 PROCEDURE — 3075F SYST BP GE 130 - 139MM HG: CPT | Performed by: FAMILY MEDICINE

## 2024-01-22 PROCEDURE — 3078F DIAST BP <80 MM HG: CPT | Performed by: FAMILY MEDICINE

## 2024-01-22 PROCEDURE — 1123F ACP DISCUSS/DSCN MKR DOCD: CPT | Performed by: FAMILY MEDICINE

## 2024-01-22 RX ORDER — PREDNISONE 20 MG/1
20 TABLET ORAL 2 TIMES DAILY
Qty: 10 TABLET | Refills: 0 | Status: SHIPPED | OUTPATIENT
Start: 2024-01-22 | End: 2024-01-27

## 2024-01-22 RX ORDER — MECLIZINE HCL 12.5 MG/1
12.5 TABLET ORAL 3 TIMES DAILY PRN
Qty: 30 TABLET | Refills: 0 | Status: SHIPPED | OUTPATIENT
Start: 2024-01-22 | End: 2024-02-01

## 2024-01-22 RX ORDER — CEFDINIR 300 MG/1
300 CAPSULE ORAL 2 TIMES DAILY
Qty: 20 CAPSULE | Refills: 0 | Status: SHIPPED | OUTPATIENT
Start: 2024-01-22 | End: 2024-02-01

## 2024-01-22 ASSESSMENT — ENCOUNTER SYMPTOMS
SINUS PRESSURE: 0
COLOR CHANGE: 0
CHEST TIGHTNESS: 0
SHORTNESS OF BREATH: 0
DIFFICULTY BREATHING: 0
DIARRHEA: 0
CONSTIPATION: 0
COUGH: 1
EYE ITCHING: 0
BLOOD IN STOOL: 0
NAUSEA: 0
BACK PAIN: 0
EYE REDNESS: 0
VOMITING: 0
WHEEZING: 0
ABDOMINAL PAIN: 0
SPUTUM PRODUCTION: 0
RHINORRHEA: 0
APNEA: 0
EYE PAIN: 0

## 2024-01-22 ASSESSMENT — PATIENT HEALTH QUESTIONNAIRE - PHQ9
SUM OF ALL RESPONSES TO PHQ QUESTIONS 1-9: 0
1. LITTLE INTEREST OR PLEASURE IN DOING THINGS: 0
2. FEELING DOWN, DEPRESSED OR HOPELESS: 0
SUM OF ALL RESPONSES TO PHQ QUESTIONS 1-9: 0
SUM OF ALL RESPONSES TO PHQ QUESTIONS 1-9: 0
SUM OF ALL RESPONSES TO PHQ9 QUESTIONS 1 & 2: 0
SUM OF ALL RESPONSES TO PHQ QUESTIONS 1-9: 0

## 2024-01-22 ASSESSMENT — COPD QUESTIONNAIRES: COPD: 1

## 2024-01-22 NOTE — PROGRESS NOTES
Chief Complaint:     Chief Complaint   Patient presents with    Dizziness     Started a couple weeks ago, feels weak     Pharyngitis           COPD    Other     Granulomatous disease         Dizziness  Associated symptoms include coughing. Pertinent negatives include no abdominal pain, arthralgias, chest pain, congestion, fatigue, fever, headaches, myalgias, nausea, neck pain, numbness, rash, sore throat, vomiting or weakness.   Pharyngitis  Associated symptoms include coughing. Pertinent negatives include no abdominal pain, arthralgias, chest pain, congestion, fatigue, fever, headaches, myalgias, nausea, neck pain, numbness, rash, sore throat, vomiting or weakness.   COPD  She complains of cough. There is no difficulty breathing, shortness of breath, sputum production or wheezing. This is a new problem. The current episode started more than 1 month ago. The problem occurs daily. The problem has been gradually improving. Associated symptoms include nasal congestion and postnasal drip. Pertinent negatives include no appetite change, chest pain, ear pain, fever, headaches, myalgias, rhinorrhea or sore throat. Her symptoms are aggravated by nothing. Her symptoms are alleviated by beta-agonist, steroid inhaler and ipratropium. She reports significant improvement on treatment. Risk factors for lung disease include occupational exposure and smoking/tobacco exposure. Her past medical history is significant for COPD.   Hypertension  This is a chronic problem. The current episode started more than 1 year ago. The problem is unchanged. The problem is controlled. Pertinent negatives include no chest pain, headaches, neck pain, palpitations or shortness of breath. There are no known risk factors for coronary artery disease. Past treatments include beta blockers. The current treatment provides significant improvement. There are no compliance problems.  There is no history of CAD/MI, CVA, heart failure, left ventricular

## 2024-01-23 NOTE — RESULT ENCOUNTER NOTE
BW all ok. WBC and RBC slightly elevated and likely due to dehydration. Increase fluids as tolerated

## 2024-02-01 ENCOUNTER — OFFICE VISIT (OUTPATIENT)
Dept: PRIMARY CARE CLINIC | Age: 84
End: 2024-02-01
Payer: MEDICARE

## 2024-02-01 ENCOUNTER — HOSPITAL ENCOUNTER (EMERGENCY)
Age: 84
Discharge: HOME | End: 2024-02-01
Attending: EMERGENCY MEDICINE
Payer: MEDICARE

## 2024-02-01 ENCOUNTER — APPOINTMENT (OUTPATIENT)
Dept: GENERAL RADIOLOGY | Age: 84
End: 2024-02-01
Payer: MEDICARE

## 2024-02-01 VITALS
WEIGHT: 211 LBS | SYSTOLIC BLOOD PRESSURE: 146 MMHG | HEIGHT: 61 IN | HEART RATE: 97 BPM | RESPIRATION RATE: 18 BRPM | BODY MASS INDEX: 39.84 KG/M2 | TEMPERATURE: 97.9 F | OXYGEN SATURATION: 99 % | DIASTOLIC BLOOD PRESSURE: 78 MMHG

## 2024-02-01 VITALS
OXYGEN SATURATION: 98 % | WEIGHT: 211 LBS | HEIGHT: 61 IN | HEART RATE: 88 BPM | TEMPERATURE: 96.8 F | DIASTOLIC BLOOD PRESSURE: 70 MMHG | BODY MASS INDEX: 39.84 KG/M2 | RESPIRATION RATE: 16 BRPM | SYSTOLIC BLOOD PRESSURE: 136 MMHG

## 2024-02-01 DIAGNOSIS — I38 VALVULAR HEART DISEASE: ICD-10-CM

## 2024-02-01 DIAGNOSIS — E55.9 VITAMIN D DEFICIENCY: ICD-10-CM

## 2024-02-01 DIAGNOSIS — R06.09 DYSPNEA ON EXERTION: ICD-10-CM

## 2024-02-01 DIAGNOSIS — R42 DIZZINESS: ICD-10-CM

## 2024-02-01 DIAGNOSIS — R53.83 FATIGUE, UNSPECIFIED TYPE: ICD-10-CM

## 2024-02-01 DIAGNOSIS — J44.9 MODERATE COPD (CHRONIC OBSTRUCTIVE PULMONARY DISEASE) (HCC): Primary | ICD-10-CM

## 2024-02-01 DIAGNOSIS — R53.1 GENERAL WEAKNESS: Primary | ICD-10-CM

## 2024-02-01 DIAGNOSIS — D71 GRANULOMATOUS DISEASE (HCC): ICD-10-CM

## 2024-02-01 DIAGNOSIS — I10 ESSENTIAL HYPERTENSION: ICD-10-CM

## 2024-02-01 DIAGNOSIS — E78.2 MIXED HYPERLIPIDEMIA: ICD-10-CM

## 2024-02-01 LAB
ALBUMIN SERPL-MCNC: 3.8 G/DL (ref 3.5–5.2)
ALP SERPL-CCNC: 61 U/L (ref 35–104)
ALT SERPL-CCNC: 18 U/L (ref 0–32)
ANION GAP SERPL CALCULATED.3IONS-SCNC: 9 MMOL/L (ref 7–16)
AST SERPL-CCNC: 19 U/L (ref 0–31)
BASOPHILS # BLD: 0.04 K/UL (ref 0–0.2)
BASOPHILS NFR BLD: 0 % (ref 0–2)
BILIRUB SERPL-MCNC: 0.5 MG/DL (ref 0–1.2)
BILIRUB UR QL STRIP: NEGATIVE
BNP SERPL-MCNC: 237 PG/ML (ref 0–450)
BUN SERPL-MCNC: 13 MG/DL (ref 6–23)
CALCIUM SERPL-MCNC: 9.8 MG/DL (ref 8.6–10.2)
CHLORIDE SERPL-SCNC: 100 MMOL/L (ref 98–107)
CLARITY UR: CLEAR
CO2 SERPL-SCNC: 29 MMOL/L (ref 22–29)
COLOR UR: YELLOW
CREAT SERPL-MCNC: 0.7 MG/DL (ref 0.5–1)
EKG ATRIAL RATE: 97 BPM
EKG P AXIS: 1 DEGREES
EKG P-R INTERVAL: 212 MS
EKG Q-T INTERVAL: 348 MS
EKG QRS DURATION: 78 MS
EKG QTC CALCULATION (BAZETT): 441 MS
EKG R AXIS: -11 DEGREES
EKG T AXIS: 46 DEGREES
EKG VENTRICULAR RATE: 97 BPM
EOSINOPHIL # BLD: 0.16 K/UL (ref 0.05–0.5)
EOSINOPHILS RELATIVE PERCENT: 1 % (ref 0–6)
ERYTHROCYTE [DISTWIDTH] IN BLOOD BY AUTOMATED COUNT: 12.9 % (ref 11.5–15)
GFR SERPL CREATININE-BSD FRML MDRD: >60 ML/MIN/1.73M2
GLUCOSE SERPL-MCNC: 92 MG/DL (ref 74–99)
GLUCOSE UR STRIP-MCNC: NEGATIVE MG/DL
HCT VFR BLD AUTO: 50.1 % (ref 34–48)
HGB BLD-MCNC: 16.2 G/DL (ref 11.5–15.5)
HGB UR QL STRIP.AUTO: NEGATIVE
IMM GRANULOCYTES # BLD AUTO: 0.11 K/UL (ref 0–0.58)
IMM GRANULOCYTES NFR BLD: 1 % (ref 0–5)
KETONES UR STRIP-MCNC: NEGATIVE MG/DL
LEUKOCYTE ESTERASE UR QL STRIP: NEGATIVE
LYMPHOCYTES NFR BLD: 2.18 K/UL (ref 1.5–4)
LYMPHOCYTES RELATIVE PERCENT: 14 % (ref 20–42)
MAGNESIUM SERPL-MCNC: 2.2 MG/DL (ref 1.6–2.6)
MCH RBC QN AUTO: 31.8 PG (ref 26–35)
MCHC RBC AUTO-ENTMCNC: 32.3 G/DL (ref 32–34.5)
MCV RBC AUTO: 98.2 FL (ref 80–99.9)
MONOCYTES NFR BLD: 1.16 K/UL (ref 0.1–0.95)
MONOCYTES NFR BLD: 8 % (ref 2–12)
NEUTROPHILS NFR BLD: 77 % (ref 43–80)
NEUTS SEG NFR BLD: 11.91 K/UL (ref 1.8–7.3)
NITRITE UR QL STRIP: NEGATIVE
PH UR STRIP: 6 [PH] (ref 5–9)
PLATELET # BLD AUTO: 284 K/UL (ref 130–450)
PMV BLD AUTO: 10.7 FL (ref 7–12)
POTASSIUM SERPL-SCNC: 4.5 MMOL/L (ref 3.5–5)
PROT SERPL-MCNC: 7.2 G/DL (ref 6.4–8.3)
PROT UR STRIP-MCNC: NEGATIVE MG/DL
RBC # BLD AUTO: 5.1 M/UL (ref 3.5–5.5)
RBC #/AREA URNS HPF: NORMAL /HPF
SODIUM SERPL-SCNC: 138 MMOL/L (ref 132–146)
SP GR UR STRIP: 1.02 (ref 1–1.03)
TROPONIN I SERPL HS-MCNC: 7 NG/L (ref 0–9)
TROPONIN I SERPL HS-MCNC: 8 NG/L (ref 0–9)
UROBILINOGEN UR STRIP-ACNC: 0.2 EU/DL (ref 0–1)
WBC #/AREA URNS HPF: NORMAL /HPF
WBC OTHER # BLD: 15.6 K/UL (ref 4.5–11.5)

## 2024-02-01 PROCEDURE — 93000 ELECTROCARDIOGRAM COMPLETE: CPT | Performed by: FAMILY MEDICINE

## 2024-02-01 PROCEDURE — 85025 COMPLETE CBC W/AUTO DIFF WBC: CPT

## 2024-02-01 PROCEDURE — 99214 OFFICE O/P EST MOD 30 MIN: CPT | Performed by: FAMILY MEDICINE

## 2024-02-01 PROCEDURE — 83880 ASSAY OF NATRIURETIC PEPTIDE: CPT

## 2024-02-01 PROCEDURE — 3075F SYST BP GE 130 - 139MM HG: CPT | Performed by: FAMILY MEDICINE

## 2024-02-01 PROCEDURE — 3078F DIAST BP <80 MM HG: CPT | Performed by: FAMILY MEDICINE

## 2024-02-01 PROCEDURE — 80053 COMPREHEN METABOLIC PANEL: CPT

## 2024-02-01 PROCEDURE — 83735 ASSAY OF MAGNESIUM: CPT

## 2024-02-01 PROCEDURE — 2580000003 HC RX 258: Performed by: NURSE PRACTITIONER

## 2024-02-01 PROCEDURE — 99285 EMERGENCY DEPT VISIT HI MDM: CPT

## 2024-02-01 PROCEDURE — 93010 ELECTROCARDIOGRAM REPORT: CPT | Performed by: INTERNAL MEDICINE

## 2024-02-01 PROCEDURE — 93005 ELECTROCARDIOGRAM TRACING: CPT | Performed by: NURSE PRACTITIONER

## 2024-02-01 PROCEDURE — 71046 X-RAY EXAM CHEST 2 VIEWS: CPT

## 2024-02-01 PROCEDURE — 81001 URINALYSIS AUTO W/SCOPE: CPT

## 2024-02-01 PROCEDURE — 84484 ASSAY OF TROPONIN QUANT: CPT

## 2024-02-01 PROCEDURE — 1123F ACP DISCUSS/DSCN MKR DOCD: CPT | Performed by: FAMILY MEDICINE

## 2024-02-01 RX ORDER — 0.9 % SODIUM CHLORIDE 0.9 %
1000 INTRAVENOUS SOLUTION INTRAVENOUS ONCE
Status: COMPLETED | OUTPATIENT
Start: 2024-02-01 | End: 2024-02-01

## 2024-02-01 RX ADMIN — SODIUM CHLORIDE 1000 ML: 9 INJECTION, SOLUTION INTRAVENOUS at 15:06

## 2024-02-01 ASSESSMENT — ENCOUNTER SYMPTOMS
COLOR CHANGE: 0
SHORTNESS OF BREATH: 0
SORE THROAT: 0
DIARRHEA: 0
ABDOMINAL PAIN: 0
APNEA: 0
CHEST TIGHTNESS: 0
RHINORRHEA: 0
NAUSEA: 0
EYE REDNESS: 0
WHEEZING: 0
COUGH: 1
SINUS PRESSURE: 0
BACK PAIN: 0
SPUTUM PRODUCTION: 0
EYE ITCHING: 0
VOMITING: 0
EYE PAIN: 0
DIFFICULTY BREATHING: 0
BLOOD IN STOOL: 0
CONSTIPATION: 0

## 2024-02-01 ASSESSMENT — COPD QUESTIONNAIRES: COPD: 1

## 2024-02-01 NOTE — ED PROVIDER NOTES
83-year-old female with history of moderate COPD, hypertension, hyperlipidemia she is On carvedilol and multivitamin.  She comes to the emergency department for the concerns of generalized weakness for the past month.  She said it is worse with exertion.  She says she feels winded at the end of the day.  She denies lightheadedness, dizziness, chest pain, shortness of breath, pleuritic chest pain, lower extremity swelling, focal weakness, GI bleed, urinary symptoms.  She does state that she keeps hydrated drinking about 60 to 80 ounces of water a day.  Patient recently finished a course of antibiotics and steroids for an ear and upper respiratory infection by her primary care doctor.  She saw her primary care doctor today and said that she had an irregular heart rhythm and was told to come to the ER for further evaluation.        Chief Complaint   Patient presents with    Dizziness    Fatigue    Irregular Heart Beat     Pt states EKG in pcp office this am showed irregular hrt rhythm       Review of Systems   Pertinent in HPI above  Physical Exam  Vitals reviewed.   Constitutional:       General: She is not in acute distress.     Appearance: She is not ill-appearing.   HENT:      Head: Normocephalic.      Right Ear: External ear normal.      Left Ear: External ear normal.      Nose: Nose normal.      Mouth/Throat:      Mouth: Mucous membranes are moist.   Eyes:      General:         Right eye: No discharge.         Left eye: No discharge.      Conjunctiva/sclera: Conjunctivae normal.   Cardiovascular:      Rate and Rhythm: Normal rate and regular rhythm.      Heart sounds: Murmur heard.      No friction rub. No gallop.   Pulmonary:      Effort: No respiratory distress.      Breath sounds: No stridor.   Abdominal:      General: There is no distension.      Tenderness: There is no abdominal tenderness. There is no guarding or rebound.   Musculoskeletal:         General: No deformity or signs of injury.      Cervical

## 2024-02-01 NOTE — DISCHARGE INSTRUCTIONS
Call Dr. Norman to evaluate aortic stenosis by calling his office tomorrow to schedule an appointment for possible echo for your symptoms.

## 2024-02-01 NOTE — ED NOTES
Department of Emergency Medicine  FIRST PROVIDER TRIAGE NOTE             Independent MLP           2/1/24  11:45 AM EST    Date of Encounter: 2/1/24   MRN: [id number]      HPI: Jayne Weaver is a 83 y.o. female who presents to the ED for Dizziness, Fatigue, and Irregular Heart Beat  Been feeling well for the past month.  She was seen by PCP today found to have an irregular heartbeat on EKG in the office and was subsequently sent here.  She denies any known history of irregular heartbeat or atrial fibrillation.  She feels weak and shaky.  She denies any chest pain, shortness of breath or dizziness.    ROS: Negative for cp or sob.    PE: Gen Appearance/Constitutional: alert     Initial Plan of Care: All treatment areas with department are currently occupied. Plan to order/Initiate the following while awaiting opening in ED: labs, EKG, and imaging studies.  Initiate Treatment-Testing, Proceed toTreatment Area When Bed Available for ED Attending/MLP to Continue Care    Electronically signed by BRANDON Biggs CNP   DD: 2/1/24       Deb Bullock APRN - CNP  02/01/24 5915

## 2024-02-01 NOTE — PROGRESS NOTES
Gastrointestinal:  Negative for abdominal pain, blood in stool, constipation, diarrhea, nausea and vomiting.   Endocrine: Negative.    Genitourinary:  Negative for decreased urine volume, difficulty urinating, dysuria, frequency, hematuria and urgency.   Musculoskeletal:  Negative for arthralgias, back pain, gait problem, myalgias and neck pain.   Skin:  Negative for color change and rash.   Allergic/Immunologic: Negative for environmental allergies and food allergies.   Neurological:  Negative for dizziness, weakness, light-headedness, numbness and headaches.   Hematological:  Negative for adenopathy. Does not bruise/bleed easily.   Psychiatric/Behavioral:  Negative for behavioral problems, dysphoric mood and sleep disturbance. The patient is not nervous/anxious and is not hyperactive.    All other systems reviewed and are negative.        /70   Pulse 88   Temp 96.8 °F (36 °C)   Resp 16   Ht 1.549 m (5' 1\")   Wt 95.7 kg (211 lb)   SpO2 98%   BMI 39.87 kg/m²     Physical Exam  Vitals and nursing note reviewed.   Constitutional:       General: She is not in acute distress.     Appearance: Normal appearance. She is well-developed.   HENT:      Head: Normocephalic and atraumatic.      Right Ear: Hearing, tympanic membrane and external ear normal. No tenderness. No middle ear effusion.      Left Ear: Hearing, tympanic membrane and external ear normal. No tenderness.  No middle ear effusion.      Nose: Nose normal. No congestion or rhinorrhea.      Right Turbinates: Not enlarged.      Left Turbinates: Not enlarged.      Mouth/Throat:      Mouth: Mucous membranes are moist.      Tongue: No lesions.      Pharynx: Oropharynx is clear. No oropharyngeal exudate or posterior oropharyngeal erythema.   Eyes:      General: No scleral icterus.     Conjunctiva/sclera: Conjunctivae normal.      Pupils: Pupils are equal, round, and reactive to light.   Neck:      Thyroid: No thyromegaly.   Cardiovascular:      Rate and

## 2024-02-02 ENCOUNTER — TELEPHONE (OUTPATIENT)
Dept: ADMINISTRATIVE | Age: 84
End: 2024-02-02

## 2024-02-02 NOTE — TELEPHONE ENCOUNTER
Pt calling for ER f/u apt timing with Dr. Norman dx:weakness/winded/irregular heart rhythm - she states ASAP and possible echo?

## 2024-02-05 NOTE — TELEPHONE ENCOUNTER
Pt called 2nd time, requesting appt w/Dr Norman for ER follow up, [facility] 2/1/24.  (LOV 8/11/23)  pt was scheduled as a NP again in August 2024.    Please advise scheduling

## 2024-02-13 ENCOUNTER — CARE COORDINATION (OUTPATIENT)
Dept: CARE COORDINATION | Age: 84
End: 2024-02-13

## 2024-02-13 NOTE — CARE COORDINATION
-Eligible referral.  -ACM spoke to patient to offer enrollment into Care Coordination program & RPM services.  -Introduced self, reason for call, and explanation of program.   -Patient declined at this time.  -Patient encouraged to contact ACM or inform PCP if she should change her mind.  -M contact information given to patient.   -Will remove name from care team.

## 2024-02-15 ENCOUNTER — OFFICE VISIT (OUTPATIENT)
Dept: CARDIOLOGY CLINIC | Age: 84
End: 2024-02-15
Payer: MEDICARE

## 2024-02-15 VITALS
SYSTOLIC BLOOD PRESSURE: 140 MMHG | WEIGHT: 209.8 LBS | OXYGEN SATURATION: 96 % | BODY MASS INDEX: 39.61 KG/M2 | DIASTOLIC BLOOD PRESSURE: 70 MMHG | HEIGHT: 61 IN | HEART RATE: 94 BPM

## 2024-02-15 DIAGNOSIS — I38 VALVULAR HEART DISEASE: ICD-10-CM

## 2024-02-15 DIAGNOSIS — R06.09 DOE (DYSPNEA ON EXERTION): ICD-10-CM

## 2024-02-15 DIAGNOSIS — I10 PRIMARY HYPERTENSION: ICD-10-CM

## 2024-02-15 DIAGNOSIS — R06.02 SOB (SHORTNESS OF BREATH): ICD-10-CM

## 2024-02-15 DIAGNOSIS — I48.91 ATRIAL FIBRILLATION, UNSPECIFIED TYPE (HCC): ICD-10-CM

## 2024-02-15 DIAGNOSIS — I35.0 NONRHEUMATIC AORTIC VALVE STENOSIS: Primary | ICD-10-CM

## 2024-02-15 PROCEDURE — 1123F ACP DISCUSS/DSCN MKR DOCD: CPT | Performed by: INTERNAL MEDICINE

## 2024-02-15 PROCEDURE — 3077F SYST BP >= 140 MM HG: CPT | Performed by: INTERNAL MEDICINE

## 2024-02-15 PROCEDURE — 3078F DIAST BP <80 MM HG: CPT | Performed by: INTERNAL MEDICINE

## 2024-02-15 PROCEDURE — 99214 OFFICE O/P EST MOD 30 MIN: CPT | Performed by: INTERNAL MEDICINE

## 2024-02-15 PROCEDURE — 93000 ELECTROCARDIOGRAM COMPLETE: CPT | Performed by: INTERNAL MEDICINE

## 2024-02-15 RX ORDER — ASPIRIN 81 MG/1
81 TABLET ORAL DAILY
COMMUNITY

## 2024-02-15 NOTE — PROGRESS NOTES
Patient was seen today and a 7 DAY monitor was placed. Monitor was ordered by . The monitor was applied, instructions were given to the patient. Patient stated understanding and gave verbalize readback.  Monitor company:[employer]  Serial number: [id number]        JACQUES BALL

## 2024-02-15 NOTE — PROGRESS NOTES
Patient Active Problem List   Diagnosis    Essential hypertension    Mixed hyperlipidemia    Fatigue    Granulomatous disease (HCC)    Chronic obstructive pulmonary disease, unspecified COPD type (HCC)    Seasonal allergies    Vitamin D deficiency    Valvular heart disease       Current Outpatient Medications   Medication Sig Dispense Refill    aspirin 81 MG EC tablet Take 1 tablet by mouth daily      MITCHEL AEROSPHERE 160-9-4.8 MCG/ACT AERO INHALE TWO PUFFS BY MOUTH EVERY MORNING AND AT BEDTIME 10.7 g 5    carvedilol (COREG) 3.125 MG tablet TAKE ONE TABLET BY MOUTH TWICE A DAY WITH MEALS 180 tablet 1    Multiple Vitamins-Minerals (CENTRUM PO) Take by mouth       No current facility-administered medications for this visit.       CC:    Patient is seen in Initial Evaluation for:  1. 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 Valvular heart disease    3. SOB (shortness of breath)    4. RAMOS (dyspnea on exertion)    5. Primary hypertension    6. Atrial fibrillation, unspecified type (HCC)        HPI:  Patient seen for difficulties with shortness of breath, fatigue, and weakness starting in early January.  She was felt to have an upper respiratory infection and was treated with steroids and antibiotics.  However, she is not improved and has also had weight loss.  Felt to have possible A-fib by EKG and underwent evaluation in the ER on 2/1/2024 that was unremarkable.    ROS:   General: (+) unusual weight loss,  change in exercise tolerance  Skin: No rash or itching  EENT: No vision changes or nosebleeds  Cardiovascular: No orthopnea or paroxysmal nocturnal dyspnea  Respiratory: No cough or hemoptysis  Gastrointestinal: No hematemesis or recent changes in bowel habits  Genitourinary: No hematuria, urgency or frequency  Musculoskeletal: No muscular weakness or joint swelling   Neurologic / Psychiatric: No incoordination or convulsions  Allergic / Immunologic/ Lymphatic / Endocrine: No anemia or bleeding

## 2024-03-01 ENCOUNTER — TELEPHONE (OUTPATIENT)
Dept: FAMILY MEDICINE CLINIC | Age: 84
End: 2024-03-01

## 2024-03-01 DIAGNOSIS — I10 ESSENTIAL HYPERTENSION: Primary | ICD-10-CM

## 2024-03-01 DIAGNOSIS — I38 VALVULAR HEART DISEASE: ICD-10-CM

## 2024-03-01 DIAGNOSIS — R06.09 DYSPNEA ON EXERTION: ICD-10-CM

## 2024-03-01 DIAGNOSIS — E78.2 MIXED HYPERLIPIDEMIA: ICD-10-CM

## 2024-03-01 DIAGNOSIS — I10 ESSENTIAL HYPERTENSION: ICD-10-CM

## 2024-03-01 DIAGNOSIS — E55.9 VITAMIN D DEFICIENCY: ICD-10-CM

## 2024-03-01 NOTE — TELEPHONE ENCOUNTER
Jayne came in to get her bloodwork today.  After the lab checked her in they realized she had not fasted.  Jayne is going to come back another day but the lab orders needs put back in her chart.

## 2024-03-04 DIAGNOSIS — E78.2 MIXED HYPERLIPIDEMIA: ICD-10-CM

## 2024-03-04 DIAGNOSIS — E55.9 VITAMIN D DEFICIENCY: ICD-10-CM

## 2024-03-04 DIAGNOSIS — I10 ESSENTIAL HYPERTENSION: ICD-10-CM

## 2024-03-04 LAB
ALBUMIN SERPL-MCNC: 3.8 G/DL (ref 3.5–5.2)
ALP BLD-CCNC: 64 U/L (ref 35–104)
ALT SERPL-CCNC: 12 U/L (ref 0–32)
ANION GAP SERPL CALCULATED.3IONS-SCNC: 16 MMOL/L (ref 7–16)
AST SERPL-CCNC: 18 U/L (ref 0–31)
BILIRUB SERPL-MCNC: 0.4 MG/DL (ref 0–1.2)
BUN BLDV-MCNC: 11 MG/DL (ref 6–23)
CALCIUM SERPL-MCNC: 9.6 MG/DL (ref 8.6–10.2)
CHLORIDE BLD-SCNC: 103 MMOL/L (ref 98–107)
CHOLESTEROL: 221 MG/DL
CO2: 23 MMOL/L (ref 22–29)
CREAT SERPL-MCNC: 0.6 MG/DL (ref 0.5–1)
GFR SERPL CREATININE-BSD FRML MDRD: >60 ML/MIN/1.73M2
GLUCOSE BLD-MCNC: 91 MG/DL (ref 74–99)
HCT VFR BLD CALC: 48.3 % (ref 34–48)
HDLC SERPL-MCNC: 33 MG/DL
HEMOGLOBIN: 15.3 G/DL (ref 11.5–15.5)
LDL CHOLESTEROL: 158 MG/DL
MCH RBC QN AUTO: 31.7 PG (ref 26–35)
MCHC RBC AUTO-ENTMCNC: 31.7 G/DL (ref 32–34.5)
MCV RBC AUTO: 100 FL (ref 80–99.9)
PDW BLD-RTO: 13.3 % (ref 11.5–15)
PLATELET # BLD: 262 K/UL (ref 130–450)
PMV BLD AUTO: 11.2 FL (ref 7–12)
POTASSIUM SERPL-SCNC: 4.9 MMOL/L (ref 3.5–5)
RBC # BLD: 4.83 M/UL (ref 3.5–5.5)
SODIUM BLD-SCNC: 142 MMOL/L (ref 132–146)
TOTAL PROTEIN: 6.8 G/DL (ref 6.4–8.3)
TRIGL SERPL-MCNC: 152 MG/DL
TSH SERPL DL<=0.05 MIU/L-ACNC: 1.81 UIU/ML (ref 0.27–4.2)
VITAMIN D 25-HYDROXY: 38.6 NG/ML (ref 30–100)
VLDLC SERPL CALC-MCNC: 30 MG/DL
WBC # BLD: 9.9 K/UL (ref 4.5–11.5)

## 2024-03-06 ENCOUNTER — TELEPHONE (OUTPATIENT)
Dept: CARDIOLOGY CLINIC | Age: 84
End: 2024-03-06

## 2024-03-06 DIAGNOSIS — I48.91 ATRIAL FIBRILLATION, UNSPECIFIED TYPE (HCC): ICD-10-CM

## 2024-03-06 NOTE — TELEPHONE ENCOUNTER
Patient notified and instructed that monitor showed a fin 100% of the time and needs to be started on Eliquis 5 mg bid.

## 2024-03-06 NOTE — TELEPHONE ENCOUNTER
----- Message from Chema Norman MD sent at 3/6/2024  1:45 PM EST -----  Needs to be started on Eliquis 5 mg bid. OV one month.

## 2024-03-07 ENCOUNTER — OFFICE VISIT (OUTPATIENT)
Dept: PRIMARY CARE CLINIC | Age: 84
End: 2024-03-07
Payer: MEDICARE

## 2024-03-07 VITALS
BODY MASS INDEX: 40.02 KG/M2 | WEIGHT: 212 LBS | SYSTOLIC BLOOD PRESSURE: 124 MMHG | HEART RATE: 89 BPM | DIASTOLIC BLOOD PRESSURE: 78 MMHG | HEIGHT: 61 IN | OXYGEN SATURATION: 93 % | RESPIRATION RATE: 16 BRPM

## 2024-03-07 DIAGNOSIS — Z00.00 MEDICARE ANNUAL WELLNESS VISIT, SUBSEQUENT: Primary | ICD-10-CM

## 2024-03-07 DIAGNOSIS — I10 ESSENTIAL HYPERTENSION: ICD-10-CM

## 2024-03-07 DIAGNOSIS — R53.83 FATIGUE, UNSPECIFIED TYPE: ICD-10-CM

## 2024-03-07 DIAGNOSIS — E78.2 MIXED HYPERLIPIDEMIA: ICD-10-CM

## 2024-03-07 DIAGNOSIS — J44.9 CHRONIC OBSTRUCTIVE PULMONARY DISEASE, UNSPECIFIED COPD TYPE (HCC): ICD-10-CM

## 2024-03-07 DIAGNOSIS — I38 VALVULAR HEART DISEASE: ICD-10-CM

## 2024-03-07 DIAGNOSIS — E55.9 VITAMIN D DEFICIENCY: ICD-10-CM

## 2024-03-07 PROBLEM — D71 GRANULOMATOUS DISEASE (HCC): Status: RESOLVED | Noted: 2022-02-24 | Resolved: 2024-03-07

## 2024-03-07 PROCEDURE — 3078F DIAST BP <80 MM HG: CPT | Performed by: FAMILY MEDICINE

## 2024-03-07 PROCEDURE — 1123F ACP DISCUSS/DSCN MKR DOCD: CPT | Performed by: FAMILY MEDICINE

## 2024-03-07 PROCEDURE — 3074F SYST BP LT 130 MM HG: CPT | Performed by: FAMILY MEDICINE

## 2024-03-07 PROCEDURE — 99214 OFFICE O/P EST MOD 30 MIN: CPT | Performed by: FAMILY MEDICINE

## 2024-03-07 PROCEDURE — G0439 PPPS, SUBSEQ VISIT: HCPCS | Performed by: FAMILY MEDICINE

## 2024-03-07 SDOH — ECONOMIC STABILITY: FOOD INSECURITY: WITHIN THE PAST 12 MONTHS, YOU WORRIED THAT YOUR FOOD WOULD RUN OUT BEFORE YOU GOT MONEY TO BUY MORE.: NEVER TRUE

## 2024-03-07 SDOH — ECONOMIC STABILITY: FOOD INSECURITY: WITHIN THE PAST 12 MONTHS, THE FOOD YOU BOUGHT JUST DIDN'T LAST AND YOU DIDN'T HAVE MONEY TO GET MORE.: NEVER TRUE

## 2024-03-07 SDOH — ECONOMIC STABILITY: INCOME INSECURITY: HOW HARD IS IT FOR YOU TO PAY FOR THE VERY BASICS LIKE FOOD, HOUSING, MEDICAL CARE, AND HEATING?: NOT HARD AT ALL

## 2024-03-07 SDOH — ECONOMIC STABILITY: HOUSING INSECURITY
IN THE LAST 12 MONTHS, WAS THERE A TIME WHEN YOU DID NOT HAVE A STEADY PLACE TO SLEEP OR SLEPT IN A SHELTER (INCLUDING NOW)?: NO

## 2024-03-07 ASSESSMENT — PATIENT HEALTH QUESTIONNAIRE - PHQ9
SUM OF ALL RESPONSES TO PHQ QUESTIONS 1-9: 0
1. LITTLE INTEREST OR PLEASURE IN DOING THINGS: 0
SUM OF ALL RESPONSES TO PHQ QUESTIONS 1-9: 0
SUM OF ALL RESPONSES TO PHQ QUESTIONS 1-9: 0
2. FEELING DOWN, DEPRESSED OR HOPELESS: 0
SUM OF ALL RESPONSES TO PHQ QUESTIONS 1-9: 0
SUM OF ALL RESPONSES TO PHQ9 QUESTIONS 1 & 2: 0

## 2024-03-07 ASSESSMENT — ENCOUNTER SYMPTOMS
CONSTIPATION: 0
EYE REDNESS: 0
SHORTNESS OF BREATH: 0
BACK PAIN: 0
SINUS PRESSURE: 0
DIFFICULTY BREATHING: 0
NAUSEA: 0
ABDOMINAL PAIN: 0
EYE PAIN: 0
WHEEZING: 0
SORE THROAT: 0
COUGH: 1
EYE ITCHING: 0
DIARRHEA: 0
CHEST TIGHTNESS: 0
COLOR CHANGE: 0
RHINORRHEA: 0
BLOOD IN STOOL: 0
SPUTUM PRODUCTION: 0
VOMITING: 0
APNEA: 0

## 2024-03-07 ASSESSMENT — COPD QUESTIONNAIRES: COPD: 1

## 2024-03-07 NOTE — PROGRESS NOTES
Medicare Annual Wellness Visit    Jayne Weaver is here for Medicare AWV, Hypertension, Hyperlipidemia, COPD, and Fatigue    Assessment & Plan   Medicare annual wellness visit, subsequent    Recommendations for Preventive Services Due: see orders and patient instructions/AVS.  Recommended screening schedule for the next 5-10 years is provided to the patient in written form: see Patient Instructions/AVS.     Return for Medicare Annual Wellness Visit in 1 year.     Subjective   The following acute and/or chronic problems were also addressed today:  HTN, Lipids, COPD    Patient's complete Health Risk Assessment and screening values have been reviewed and are found in Flowsheets. The following problems were reviewed today and where indicated follow up appointments were made and/or referrals ordered.    Positive Risk Factor Screenings with Interventions:                Activity, Diet, and Weight:  On average, how many days per week do you engage in moderate to strenuous exercise (like a brisk walk)?: 0 days  On average, how many minutes do you engage in exercise at this level?: 0 min    Do you eat balanced/healthy meals regularly?: Yes    Body mass index is 40.06 kg/m². (!) Abnormal      Inactivity Interventions:  Patient declined any further interventions or treatment  Obesity Interventions:  Patient declines any further evaluation or treatment          Vision Screen:  Do you have difficulty driving, watching TV, or doing any of your daily activities because of your eyesight?: No  Have you had an eye exam within the past year?: (!) No  No results found.    Interventions:   Patient declines any further evaluation or treatment      Advanced Directives:  Do you have a Living Will?: (!) No    Intervention:  has NO advanced directive - not interested in additional information       CV Risk Counseling:  Patient was asked about her current diet and exercise habits, and personalized advice was provided regarding recommended 
General: No focal deficit present.      Mental Status: She is alert and oriented to person, place, and time.      Cranial Nerves: No cranial nerve deficit.      Deep Tendon Reflexes: Reflexes are normal and symmetric. Reflexes normal.   Psychiatric:         Mood and Affect: Mood normal.                                 ASSESSMENT/PLAN:    Patient Active Problem List   Diagnosis    Essential hypertension    Mixed hyperlipidemia    Fatigue    Chronic obstructive pulmonary disease, unspecified COPD type (HCC)    Seasonal allergies    Vitamin D deficiency    Valvular heart disease       Medicare annual wellness visit, subsequent  Essential hypertension  Assessment & Plan:   Well-controlled, continue current medications and continue current treatment plan  Orders:  -     Comprehensive Metabolic Panel; Future  -     Lipid Panel; Future  -     TSH; Future  -     CBC with Auto Differential; Future  Mixed hyperlipidemia  Assessment & Plan:   Well-controlled, continue current medications and continue current treatment plan  Orders:  -     Comprehensive Metabolic Panel; Future  -     Lipid Panel; Future  -     TSH; Future  -     CBC with Auto Differential; Future  Chronic obstructive pulmonary disease, unspecified COPD type (HCC)  Assessment & Plan:   Well-controlled, continue current medications and continue current treatment plan  Vitamin D deficiency  -     Vitamin D 25 Hydroxy; Future  Fatigue, unspecified type  Valvular heart disease  Assessment & Plan:   Well-controlled, continue current medications and continue current treatment plan      Orders Placed This Encounter    Comprehensive Metabolic Panel     Standing Status:   Future     Standing Expiration Date:   3/8/2025    Lipid Panel     Standing Status:   Future     Standing Expiration Date:   3/8/2025     Order Specific Question:   Is Patient Fasting?/# of Hours     Answer:   10    TSH     Standing Status:   Future     Standing Expiration Date:   3/8/2025    CBC with

## 2024-03-07 NOTE — PATIENT INSTRUCTIONS
Learning About Being Active as an Older Adult  Why is being active important as you get older?     Being active is one of the best things you can do for your health. And it's never too late to start. Being active--or getting active, if you aren't already--has definite benefits. It can:  Give you more energy,  Keep your mind sharp.  Improve balance to reduce your risk of falls.  Help you manage chronic illness with fewer medicines.  No matter how old you are, how fit you are, or what health problems you have, there is a form of activity that will work for you. And the more physical activity you can do, the better your overall health will be.  What kinds of activity can help you stay healthy?  Being more active will make your daily activities easier. Physical activity includes planned exercise and things you do in daily life. There are four types of activity:  Aerobic.  Doing aerobic activity makes your heart and lungs strong.  Includes walking, dancing, and gardening.  Aim for at least 2½ hours spread throughout the week.  It improves your energy and can help you sleep better.  Muscle-strengthening.  This type of activity can help maintain muscle and strengthen bones.  Includes climbing stairs, using resistance bands, and lifting or carrying heavy loads.  Aim for at least twice a week.  It can help protect the knees and other joints.  Stretching.  Stretching gives you better range of motion in joints and muscles.  Includes upper arm stretches, calf stretches, and gentle yoga.  Aim for at least twice a week, preferably after your muscles are warmed up from other activities.  It can help you function better in daily life.  Balancing.  This helps you stay coordinated and have good posture.  Includes heel-to-toe walking, ry chi, and certain types of yoga.  Aim for at least 3 days a week.  It can reduce your risk of falling.  Even if you have a hard time meeting the recommendations, it's better to be more active

## 2024-04-13 ENCOUNTER — HOSPITAL ENCOUNTER (OUTPATIENT)
Dept: CT IMAGING | Age: 84
End: 2024-04-13
Payer: MEDICARE

## 2024-04-13 DIAGNOSIS — R91.1 PULMONARY NODULE: ICD-10-CM

## 2024-04-13 PROCEDURE — 71250 CT THORAX DX C-: CPT

## 2024-04-17 ENCOUNTER — OFFICE VISIT (OUTPATIENT)
Dept: CARDIOLOGY CLINIC | Age: 84
End: 2024-04-17
Payer: MEDICARE

## 2024-04-17 VITALS
HEIGHT: 61 IN | HEART RATE: 76 BPM | WEIGHT: 209.6 LBS | DIASTOLIC BLOOD PRESSURE: 60 MMHG | RESPIRATION RATE: 16 BRPM | BODY MASS INDEX: 39.57 KG/M2 | SYSTOLIC BLOOD PRESSURE: 122 MMHG

## 2024-04-17 DIAGNOSIS — Z79.01 CHRONIC ANTICOAGULATION: ICD-10-CM

## 2024-04-17 DIAGNOSIS — R06.09 DOE (DYSPNEA ON EXERTION): ICD-10-CM

## 2024-04-17 DIAGNOSIS — I48.0 PAROXYSMAL A-FIB (HCC): ICD-10-CM

## 2024-04-17 DIAGNOSIS — I35.0 NONRHEUMATIC AORTIC VALVE STENOSIS: Primary | ICD-10-CM

## 2024-04-17 PROCEDURE — 3074F SYST BP LT 130 MM HG: CPT | Performed by: INTERNAL MEDICINE

## 2024-04-17 PROCEDURE — 99214 OFFICE O/P EST MOD 30 MIN: CPT | Performed by: INTERNAL MEDICINE

## 2024-04-17 PROCEDURE — 93000 ELECTROCARDIOGRAM COMPLETE: CPT | Performed by: INTERNAL MEDICINE

## 2024-04-17 PROCEDURE — 1123F ACP DISCUSS/DSCN MKR DOCD: CPT | Performed by: INTERNAL MEDICINE

## 2024-04-17 PROCEDURE — 3078F DIAST BP <80 MM HG: CPT | Performed by: INTERNAL MEDICINE

## 2024-04-17 NOTE — PROGRESS NOTES
Patient Active Problem List   Diagnosis    Essential hypertension    Mixed hyperlipidemia    Fatigue    Chronic obstructive pulmonary disease, unspecified COPD type (HCC)    Seasonal allergies    Vitamin D deficiency    Valvular heart disease       Current Outpatient Medications   Medication Sig Dispense Refill    rosuvastatin (CRESTOR) 5 MG tablet Take 1 tablet by mouth daily      apixaban (ELIQUIS) 5 MG TABS tablet Take 1 tablet by mouth 2 times daily 60 tablet 5    BREZTRI AEROSPHERE 160-9-4.8 MCG/ACT AERO INHALE TWO PUFFS BY MOUTH EVERY MORNING AND AT BEDTIME 10.7 g 5    carvedilol (COREG) 3.125 MG tablet TAKE ONE TABLET BY MOUTH TWICE A DAY WITH MEALS 180 tablet 1    Multiple Vitamins-Minerals (CENTRUM PO) Take by mouth       No current facility-administered medications for this visit.       CC:    Patient is seen for follow-up for:  1. 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 RAMOS (dyspnea on exertion)    3. Chronic anticoagulation    4. Paroxysmal A-fib (HCC)        HPI:  Notes she remains short of breath with exertion.  Resolves with rest.  Denies any chest pain or near syncope.  Tolerating institution of anticoagulation well.  Does note some improvement post inhalers.    ROS:   General: (+) unusual weight loss,  change in exercise tolerance  Skin: No rash or itching  EENT: No vision changes or nosebleeds  Cardiovascular: No orthopnea or paroxysmal nocturnal dyspnea  Respiratory: No cough or hemoptysis  Gastrointestinal: No hematemesis or recent changes in bowel habits  Genitourinary: No hematuria, urgency or frequency  Musculoskeletal: No muscular weakness or joint swelling   Neurologic / Psychiatric: No incoordination or convulsions  Allergic / Immunologic/ Lymphatic / Endocrine: No anemia or bleeding tendency    Social History     Socioeconomic History    Marital status:      Spouse name: Not on file    Number of children: Not on file    Years of education: Not on file

## 2024-04-24 RX ORDER — ROSUVASTATIN CALCIUM 5 MG/1
5 TABLET, COATED ORAL DAILY
Qty: 90 TABLET | Refills: 1 | Status: SHIPPED | OUTPATIENT
Start: 2024-04-24

## 2024-05-09 DIAGNOSIS — I10 ESSENTIAL HYPERTENSION: ICD-10-CM

## 2024-05-09 RX ORDER — CARVEDILOL 3.12 MG/1
3.12 TABLET ORAL 2 TIMES DAILY WITH MEALS
Qty: 180 TABLET | Refills: 1 | Status: SHIPPED | OUTPATIENT
Start: 2024-05-09

## 2024-06-22 ENCOUNTER — APPOINTMENT (OUTPATIENT)
Dept: GENERAL RADIOLOGY | Age: 84
DRG: 392 | End: 2024-06-22
Payer: MEDICARE

## 2024-06-22 ENCOUNTER — APPOINTMENT (OUTPATIENT)
Dept: CT IMAGING | Age: 84
DRG: 392 | End: 2024-06-22
Payer: MEDICARE

## 2024-06-22 ENCOUNTER — HOSPITAL ENCOUNTER (INPATIENT)
Age: 84
LOS: 5 days | Discharge: HOME | DRG: 392 | End: 2024-06-27
Attending: EMERGENCY MEDICINE | Admitting: INTERNAL MEDICINE
Payer: MEDICARE

## 2024-06-22 DIAGNOSIS — I35.0 NONRHEUMATIC AORTIC VALVE STENOSIS: ICD-10-CM

## 2024-06-22 DIAGNOSIS — R13.10 DYSPHAGIA, UNSPECIFIED TYPE: Primary | ICD-10-CM

## 2024-06-22 LAB
ALBUMIN SERPL-MCNC: 3.9 G/DL (ref 3.5–5.2)
ALP SERPL-CCNC: 68 U/L (ref 35–104)
ALT SERPL-CCNC: 11 U/L (ref 0–32)
ANION GAP SERPL CALCULATED.3IONS-SCNC: 9 MMOL/L (ref 7–16)
AST SERPL-CCNC: 21 U/L (ref 0–31)
BASOPHILS # BLD: 0.02 K/UL (ref 0–0.2)
BASOPHILS NFR BLD: 0 % (ref 0–2)
BILIRUB SERPL-MCNC: 0.7 MG/DL (ref 0–1.2)
BUN SERPL-MCNC: 15 MG/DL (ref 6–23)
CALCIUM SERPL-MCNC: 9.3 MG/DL (ref 8.6–10.2)
CHLORIDE SERPL-SCNC: 102 MMOL/L (ref 98–107)
CO2 SERPL-SCNC: 28 MMOL/L (ref 22–29)
CREAT SERPL-MCNC: 0.6 MG/DL (ref 0.5–1)
EOSINOPHIL # BLD: 0.06 K/UL (ref 0.05–0.5)
EOSINOPHILS RELATIVE PERCENT: 1 % (ref 0–6)
ERYTHROCYTE [DISTWIDTH] IN BLOOD BY AUTOMATED COUNT: 12.6 % (ref 11.5–15)
GFR, ESTIMATED: 88 ML/MIN/1.73M2
GLUCOSE SERPL-MCNC: 99 MG/DL (ref 74–99)
HCT VFR BLD AUTO: 47 % (ref 34–48)
HGB BLD-MCNC: 15 G/DL (ref 11.5–15.5)
IMM GRANULOCYTES # BLD AUTO: 0.04 K/UL (ref 0–0.58)
IMM GRANULOCYTES NFR BLD: 0 % (ref 0–5)
LYMPHOCYTES NFR BLD: 2 K/UL (ref 1.5–4)
LYMPHOCYTES RELATIVE PERCENT: 18 % (ref 20–42)
MCH RBC QN AUTO: 31.3 PG (ref 26–35)
MCHC RBC AUTO-ENTMCNC: 31.9 G/DL (ref 32–34.5)
MCV RBC AUTO: 97.9 FL (ref 80–99.9)
MONOCYTES NFR BLD: 0.75 K/UL (ref 0.1–0.95)
MONOCYTES NFR BLD: 7 % (ref 2–12)
NEUTROPHILS NFR BLD: 74 % (ref 43–80)
NEUTS SEG NFR BLD: 8.02 K/UL (ref 1.8–7.3)
PLATELET # BLD AUTO: 258 K/UL (ref 130–450)
PMV BLD AUTO: 10.7 FL (ref 7–12)
POTASSIUM SERPL-SCNC: 5 MMOL/L (ref 3.5–5)
PROT SERPL-MCNC: 6.9 G/DL (ref 6.4–8.3)
RBC # BLD AUTO: 4.8 M/UL (ref 3.5–5.5)
SODIUM SERPL-SCNC: 139 MMOL/L (ref 132–146)
TROPONIN I SERPL HS-MCNC: 9 NG/L (ref 0–9)
WBC OTHER # BLD: 10.9 K/UL (ref 4.5–11.5)

## 2024-06-22 PROCEDURE — 84484 ASSAY OF TROPONIN QUANT: CPT

## 2024-06-22 PROCEDURE — 70491 CT SOFT TISSUE NECK W/DYE: CPT

## 2024-06-22 PROCEDURE — 2580000003 HC RX 258: Performed by: INTERNAL MEDICINE

## 2024-06-22 PROCEDURE — 80053 COMPREHEN METABOLIC PANEL: CPT

## 2024-06-22 PROCEDURE — 6360000002 HC RX W HCPCS: Performed by: INTERNAL MEDICINE

## 2024-06-22 PROCEDURE — 6370000000 HC RX 637 (ALT 250 FOR IP): Performed by: INTERNAL MEDICINE

## 2024-06-22 PROCEDURE — 1200000000 HC SEMI PRIVATE

## 2024-06-22 PROCEDURE — 94640 AIRWAY INHALATION TREATMENT: CPT

## 2024-06-22 PROCEDURE — 93005 ELECTROCARDIOGRAM TRACING: CPT | Performed by: STUDENT IN AN ORGANIZED HEALTH CARE EDUCATION/TRAINING PROGRAM

## 2024-06-22 PROCEDURE — C9113 INJ PANTOPRAZOLE SODIUM, VIA: HCPCS | Performed by: INTERNAL MEDICINE

## 2024-06-22 PROCEDURE — 99285 EMERGENCY DEPT VISIT HI MDM: CPT

## 2024-06-22 PROCEDURE — APPSS45 APP SPLIT SHARED TIME 31-45 MINUTES

## 2024-06-22 PROCEDURE — 99222 1ST HOSP IP/OBS MODERATE 55: CPT | Performed by: INTERNAL MEDICINE

## 2024-06-22 PROCEDURE — 6360000004 HC RX CONTRAST MEDICATION: Performed by: RADIOLOGY

## 2024-06-22 PROCEDURE — 85025 COMPLETE CBC W/AUTO DIFF WBC: CPT

## 2024-06-22 PROCEDURE — 71046 X-RAY EXAM CHEST 2 VIEWS: CPT

## 2024-06-22 RX ORDER — PANTOPRAZOLE SODIUM 40 MG/10ML
INJECTION, POWDER, LYOPHILIZED, FOR SOLUTION INTRAVENOUS
Status: DISPENSED
Start: 2024-06-22 | End: 2024-06-23

## 2024-06-22 RX ORDER — CARVEDILOL 3.12 MG/1
3.12 TABLET ORAL 2 TIMES DAILY WITH MEALS
Status: DISCONTINUED | OUTPATIENT
Start: 2024-06-22 | End: 2024-06-24

## 2024-06-22 RX ORDER — IPRATROPIUM BROMIDE AND ALBUTEROL SULFATE 2.5; .5 MG/3ML; MG/3ML
1 SOLUTION RESPIRATORY (INHALATION)
Status: DISCONTINUED | OUTPATIENT
Start: 2024-06-22 | End: 2024-06-27 | Stop reason: HOSPADM

## 2024-06-22 RX ORDER — SODIUM CHLORIDE 0.9 % (FLUSH) 0.9 %
5-40 SYRINGE (ML) INJECTION EVERY 12 HOURS SCHEDULED
Status: DISCONTINUED | OUTPATIENT
Start: 2024-06-22 | End: 2024-06-27 | Stop reason: HOSPADM

## 2024-06-22 RX ORDER — SODIUM CHLORIDE 9 MG/ML
INJECTION, SOLUTION INTRAVENOUS PRN
Status: DISCONTINUED | OUTPATIENT
Start: 2024-06-22 | End: 2024-06-27 | Stop reason: HOSPADM

## 2024-06-22 RX ORDER — POTASSIUM CHLORIDE 7.45 MG/ML
10 INJECTION INTRAVENOUS PRN
Status: DISCONTINUED | OUTPATIENT
Start: 2024-06-22 | End: 2024-06-27 | Stop reason: HOSPADM

## 2024-06-22 RX ORDER — SODIUM CHLORIDE, SODIUM LACTATE, POTASSIUM CHLORIDE, CALCIUM CHLORIDE 600; 310; 30; 20 MG/100ML; MG/100ML; MG/100ML; MG/100ML
INJECTION, SOLUTION INTRAVENOUS CONTINUOUS
Status: DISCONTINUED | OUTPATIENT
Start: 2024-06-22 | End: 2024-06-25

## 2024-06-22 RX ORDER — ONDANSETRON 2 MG/ML
4 INJECTION INTRAMUSCULAR; INTRAVENOUS EVERY 6 HOURS PRN
Status: DISCONTINUED | OUTPATIENT
Start: 2024-06-22 | End: 2024-06-27 | Stop reason: HOSPADM

## 2024-06-22 RX ORDER — ONDANSETRON 4 MG/1
4 TABLET, ORALLY DISINTEGRATING ORAL EVERY 8 HOURS PRN
Status: DISCONTINUED | OUTPATIENT
Start: 2024-06-22 | End: 2024-06-27 | Stop reason: HOSPADM

## 2024-06-22 RX ORDER — MAGNESIUM SULFATE IN WATER 40 MG/ML
2000 INJECTION, SOLUTION INTRAVENOUS PRN
Status: DISCONTINUED | OUTPATIENT
Start: 2024-06-22 | End: 2024-06-27 | Stop reason: HOSPADM

## 2024-06-22 RX ORDER — ACETAMINOPHEN 650 MG/1
650 SUPPOSITORY RECTAL EVERY 6 HOURS PRN
Status: DISCONTINUED | OUTPATIENT
Start: 2024-06-22 | End: 2024-06-27 | Stop reason: HOSPADM

## 2024-06-22 RX ORDER — PANTOPRAZOLE SODIUM 40 MG/10ML
40 INJECTION, POWDER, LYOPHILIZED, FOR SOLUTION INTRAVENOUS 2 TIMES DAILY
Status: DISCONTINUED | OUTPATIENT
Start: 2024-06-22 | End: 2024-06-27 | Stop reason: HOSPADM

## 2024-06-22 RX ORDER — POTASSIUM CHLORIDE 20 MEQ/1
40 TABLET, EXTENDED RELEASE ORAL PRN
Status: DISCONTINUED | OUTPATIENT
Start: 2024-06-22 | End: 2024-06-27 | Stop reason: HOSPADM

## 2024-06-22 RX ORDER — SODIUM CHLORIDE 0.9 % (FLUSH) 0.9 %
5-40 SYRINGE (ML) INJECTION PRN
Status: DISCONTINUED | OUTPATIENT
Start: 2024-06-22 | End: 2024-06-27 | Stop reason: HOSPADM

## 2024-06-22 RX ORDER — ACETAMINOPHEN 325 MG/1
650 TABLET ORAL EVERY 6 HOURS PRN
Status: DISCONTINUED | OUTPATIENT
Start: 2024-06-22 | End: 2024-06-27 | Stop reason: HOSPADM

## 2024-06-22 RX ORDER — POLYETHYLENE GLYCOL 3350 17 G/17G
17 POWDER, FOR SOLUTION ORAL DAILY PRN
Status: DISCONTINUED | OUTPATIENT
Start: 2024-06-22 | End: 2024-06-27 | Stop reason: HOSPADM

## 2024-06-22 RX ORDER — ROSUVASTATIN CALCIUM 5 MG/1
5 TABLET, COATED ORAL DAILY
Status: DISCONTINUED | OUTPATIENT
Start: 2024-06-22 | End: 2024-06-27 | Stop reason: HOSPADM

## 2024-06-22 RX ADMIN — PANTOPRAZOLE SODIUM 40 MG: 40 INJECTION, POWDER, FOR SOLUTION INTRAVENOUS at 16:50

## 2024-06-22 RX ADMIN — APIXABAN 5 MG: 5 TABLET, FILM COATED ORAL at 21:02

## 2024-06-22 RX ADMIN — IPRATROPIUM BROMIDE AND ALBUTEROL SULFATE 1 DOSE: 2.5; .5 SOLUTION RESPIRATORY (INHALATION) at 21:39

## 2024-06-22 RX ADMIN — CARVEDILOL 3.12 MG: 3.12 TABLET, FILM COATED ORAL at 18:36

## 2024-06-22 RX ADMIN — ROSUVASTATIN CALCIUM 5 MG: 5 TABLET, FILM COATED ORAL at 18:36

## 2024-06-22 RX ADMIN — SODIUM CHLORIDE, POTASSIUM CHLORIDE, SODIUM LACTATE AND CALCIUM CHLORIDE: 600; 310; 30; 20 INJECTION, SOLUTION INTRAVENOUS at 15:37

## 2024-06-22 RX ADMIN — IOPAMIDOL 75 ML: 755 INJECTION, SOLUTION INTRAVENOUS at 12:30

## 2024-06-22 RX ADMIN — PANTOPRAZOLE SODIUM 40 MG: 40 INJECTION, POWDER, FOR SOLUTION INTRAVENOUS at 21:02

## 2024-06-22 NOTE — ED PROVIDER NOTES
Name: Jayne Weaver    MRN: [id number]     Date / Time Roomed:  6/22/2024 10:54 AM  ED Bed Assignment:  0736/0736-A    ------------------ History of Present Illness --------------------  6/22/24, Time: 11:00 AM EDT   Chief Complaint   Patient presents with    Dysphagia     unable to swollow since yesterday, has been having increasing problem      HPI    Jayne Weaver is a 84 y.o. female, with hx of hypertension, hyperlipidemia, on Eliquis, who presents to the ED today for difficulty swallowing foods and liquids over the past day.  She states she has had diffuse swallowing foods intermittently over the past year and sometimes throws her food back up right after swallowing.  She states that is been going on for about a year.  She relates yesterday she was eating some pork chop and took a very small bite however threw this immediately up and states she has not been able to even drink any fluids or eat anything since yesterday as it comes right back up.  She states she does not feel nauseated, denies any chest pain or throat pain.  Denies any globus sensation or feeling of food stuck in her throat.  She relates that she is just not able to keep anything down as she feels a immediately comes back up.  She states she has never seen GI doctor before.  She states she has not been able to take her medications since yesterday.  She denies any chest pain, shortness of breath, abdominal pain, fever, sore throat, other GI or  complaints. The pt denies other ROS at this time.     PCP: Navdeep Rankin DO.    -------------------- PMH --------------------    Past Medical History:   has a past medical history of Hyperlipidemia and Hypertension.     Surgical History:  Past Surgical History:   Procedure Laterality Date    BREAST SURGERY Right     lumpectomy    CHOLECYSTECTOMY      COLONOSCOPY  02/20/2017    TUBAL LIGATION         Social History:  Social History     Tobacco Use    Smoking status: Never    Smokeless tobacco: Never      Lab work unremarkable.  CT imaging of the neck was negative for evidence of acute abnormality, there was small fluid within the esophagus which may represent reflux.  Chest x-ray was negative.  Patient had CT of the chest in April which showed some pulmonary nodules which were rather unchanged from prior CT imaging, as her symptoms have been ongoing over the past year, felt unnecessary to repeat CT imaging of the chest today. No other focal deficits present. Pt symptoms ongoing for year, acute cva felt unlikely    As patient is unable to swallow foods or liquids, concern possible esophageal dysmotility and therefore recommend admission for further care and evaluation she was agreeable.  Patient denies having seen a GI doctor before.     Spoke with Dr. Ahumada for admission.       -------------------- Consults --------------------  (Unless stated prior)  IP CONSULT TO GI  IP CONSULT TO SPIRITUAL SERVICES    -------------------- RESULTS --------------------    Labs:  Results for orders placed or performed during the hospital encounter of 06/22/24   Troponin   Result Value Ref Range    Troponin, High Sensitivity 9 0 - 9 ng/L   CBC with Auto Differential   Result Value Ref Range    WBC 10.9 4.5 - 11.5 k/uL    RBC 4.80 3.50 - 5.50 m/uL    Hemoglobin 15.0 11.5 - 15.5 g/dL    Hematocrit 47.0 34.0 - 48.0 %    MCV 97.9 80.0 - 99.9 fL    MCH 31.3 26.0 - 35.0 pg    MCHC 31.9 (L) 32.0 - 34.5 g/dL    RDW 12.6 11.5 - 15.0 %    Platelets 258 130 - 450 k/uL    MPV 10.7 7.0 - 12.0 fL    Neutrophils % 74 43.0 - 80.0 %    Lymphocytes % 18 (L) 20.0 - 42.0 %    Monocytes % 7 2.0 - 12.0 %    Eosinophils % 1 0 - 6 %    Basophils % 0 0.0 - 2.0 %    Immature Granulocytes % 0 0.0 - 5.0 %    Neutrophils Absolute 8.02 (H) 1.80 - 7.30 k/uL    Lymphocytes Absolute 2.00 1.50 - 4.00 k/uL    Monocytes Absolute 0.75 0.10 - 0.95 k/uL    Eosinophils Absolute 0.06 0.05 - 0.50 k/uL    Basophils Absolute 0.02 0.00 - 0.20 k/uL    Immature Granulocytes

## 2024-06-22 NOTE — ED NOTES
ED to Inpatient Handoff Report    Notified Benjamin that electronic handoff available and patient ready for transport to room 736.    Safety Risks: None identified    Patient in Restraints: no    Constant Observer or Patient : no    Telemetry Monitoring Ordered :Yes           Order to transfer to unit without monitor:NO    Last MEWS: 1 Time completed: 1433    Deterioration Index Score:   Predictive Model Details          23 (Normal)  Factor Value    Calculated 6/22/2024 14:34 61% Age 84 years old    Deterioration Index Model 22% Potassium 5.0 mmol/L     8% WBC count 10.9 k/uL     7% Systolic 141     1% Hematocrit 47.0 %     1% Temperature 97.4 °F (36.3 °C)     0% Pulse oximetry 96 %     0% Pulse 76     0% Respiratory rate 16     0% Sodium 139 mmol/L        Vitals:    06/22/24 0956 06/22/24 1426   BP: (!) 147/90 (!) 141/112   Pulse: 95 76   Resp: 14 16   Temp: 97 °F (36.1 °C) 97.4 °F (36.3 °C)   TempSrc: Temporal    SpO2: 94% 96%   Weight: 96.2 kg (212 lb)    Height: 1.549 m (5' 1\")          Opportunity for questions and clarification was provided.

## 2024-06-22 NOTE — H&P
[facility] Hospitalist Group   History and Physical      CHIEF COMPLAINT:  dyphagia    History of Present Illness:  84 y.o. female with a history of HLD, HTN, COPD, a fib on eliquis, valvular heart disease, s/p lumpectomy presents with dysphagia and anorexia X 1 day. Pt reports symptoms have been going on intermittently for the past year.  Patient states for the past year she attributed symptoms to certain foods.  Patient states within the past day she has not even been able to keep water down.  She does not follow with GI. Pt did have a colonoscopy with Dr. Lopez in Feb 2017 which showed hemorrhoids and mild diverticular changes but was otherwise normal. Pt has no other cranial nerve deficits at this time. CT neck shows small fluid in thoracic esophagus. Labs unremarkable.     Informant(s) for H&P:Patient and EMR    REVIEW OF SYSTEMS:  Vomiting. no fevers, chills, cp, sob, n, ha, vision/hearing changes, wt changes, hot/cold flashes, other open skin lesions, diarrhea, constipation, dysuria/hematuria unless noted in HPI. Complete ROS performed with the patient and is otherwise negative.      PMH:  Past Medical History:   Diagnosis Date    Hyperlipidemia     Hypertension        Surgical History:  Past Surgical History:   Procedure Laterality Date    BREAST SURGERY Right     lumpectomy    CHOLECYSTECTOMY      COLONOSCOPY  02/20/2017    TUBAL LIGATION         Medications Prior to Admission:    Prior to Admission medications    Medication Sig Start Date End Date Taking? Authorizing Provider   Budeson-Glycopyrrol-Formoterol (BREZTRI AEROSPHERE) 160-9-4.8 MCG/ACT AERO Inhale 2 puffs into the lungs in the morning and at bedtime 5/23/24   Brandin Lee, DO   carvedilol (COREG) 3.125 MG tablet Take 1 tablet by mouth 2 times daily (with meals) with meals 5/9/24   Navdeep Rankin DO   rosuvastatin (CRESTOR) 5 MG tablet Take 1 tablet by mouth daily 4/24/24   Moisés Kyle, DO   apixaban (ELIQUIS) 5 MG  for exam:->Dysphagia FINDINGS: The lungs are without acute focal process.  There is no effusion or pneumothorax. The cardiomediastinal silhouette is without acute process. The osseous structures are without acute process.     No acute process.     CT SOFT TISSUE NECK W CONTRAST    Result Date: 6/22/2024  EXAMINATION: CT OF THE NECK SOFT TISSUE WITH CONTRAST  6/22/2024 TECHNIQUE: CT of the neck was performed with the administration of intravenous contrast. Multiplanar reformatted images are provided for review. Automated exposure control, iterative reconstruction, and/or weight based adjustment of the mA/kV was utilized to reduce the radiation dose to as low as reasonably achievable. COMPARISON: None. HISTORY: ORDERING SYSTEM PROVIDED HISTORY: dysphagia x 1 year TECHNOLOGIST PROVIDED HISTORY: Reason for exam:->dysphagia x 1 year Additional Contrast?->1 FINDINGS: PHARYNX/LARYNX:  The tonsillar pillars are normal in appearance.  The tongue is normal in appearance.  The valleculae, epiglottis, aryepiglottic folds and pyriform sinuses appear unremarkable.  The true and false vocal cords are normal in appearance.  No mass or abscess is seen. SALIVARY GLANDS/THYROID:  The parotid and submandibular glands appear unremarkable.  The thyroid gland appears unremarkable. LYMPH NODES:  No cervical or supraclavicular lymphadenopathy is seen. SOFT TISSUES:  No appreciable soft tissue swelling or mass is seen.  Small fluid is present within thoracic esophagus likely representing reflux disease. BRAIN/ORBITS/SINUSES:  The visualized portion of the intracranial contents appear unremarkable.  The visualized portion of the orbits, paranasal sinuses and mastoid air cells demonstrate no acute abnormality. LUNG APICES/SUPERIOR MEDIASTINUM:  No focal consolidation is seen within the visualized lung apices.  No superior mediastinal lymphadenopathy or mass. The visualized portion of the trachea appears unremarkable. BONES:  No aggressive

## 2024-06-22 NOTE — PROGRESS NOTES
4 Eyes Skin Assessment     NAME:  Jayne Weaver  YOB: 1940  MEDICAL RECORD NUMBER:  [id number]    The patient is being assessed for  Admission    I agree that at least one RN has performed a thorough Head to Toe Skin Assessment on the patient. ALL assessment sites listed below have been assessed.      Areas assessed by both nurses:    Head, Face, Ears, Shoulders, Back, Chest, Arms, Elbows, Hands, Sacrum. Buttock, Coccyx, Ischium, and Legs. Feet and Heels        Does the Patient have a Wound? No noted wound(s)       Franco Prevention initiated by RN: Yes  Wound Care Orders initiated by RN: No    Pressure Injury (Stage 3,4, Unstageable, DTI, NWPT, and Complex wounds) if present, place Wound referral order by RN under : No    New Ostomies, if present place, Ostomy referral order under : No     Nurse 1 eSignature: Electronically signed by Benjamin Rowley RN on 6/22/24 at 6:42 PM EDT    **SHARE this note so that the co-signing nurse can place an eSignature**    Nurse 2 eSignature: Electronically signed by Brinda Daveis RN on 6/22/24 at 7:35 PM EDT

## 2024-06-23 LAB
ANION GAP SERPL CALCULATED.3IONS-SCNC: 9 MMOL/L (ref 7–16)
BUN SERPL-MCNC: 15 MG/DL (ref 6–23)
CALCIUM SERPL-MCNC: 8.9 MG/DL (ref 8.6–10.2)
CHLORIDE SERPL-SCNC: 102 MMOL/L (ref 98–107)
CO2 SERPL-SCNC: 27 MMOL/L (ref 22–29)
CREAT SERPL-MCNC: 0.6 MG/DL (ref 0.5–1)
ERYTHROCYTE [DISTWIDTH] IN BLOOD BY AUTOMATED COUNT: 12.7 % (ref 11.5–15)
GFR, ESTIMATED: 88 ML/MIN/1.73M2
GLUCOSE SERPL-MCNC: 91 MG/DL (ref 74–99)
HCT VFR BLD AUTO: 41.9 % (ref 34–48)
HGB BLD-MCNC: 13.4 G/DL (ref 11.5–15.5)
MCH RBC QN AUTO: 31.5 PG (ref 26–35)
MCHC RBC AUTO-ENTMCNC: 32 G/DL (ref 32–34.5)
MCV RBC AUTO: 98.6 FL (ref 80–99.9)
PLATELET # BLD AUTO: 213 K/UL (ref 130–450)
PMV BLD AUTO: 10.8 FL (ref 7–12)
POTASSIUM SERPL-SCNC: 4.1 MMOL/L (ref 3.5–5)
RBC # BLD AUTO: 4.25 M/UL (ref 3.5–5.5)
SODIUM SERPL-SCNC: 138 MMOL/L (ref 132–146)
WBC OTHER # BLD: 10.4 K/UL (ref 4.5–11.5)

## 2024-06-23 PROCEDURE — 6370000000 HC RX 637 (ALT 250 FOR IP): Performed by: INTERNAL MEDICINE

## 2024-06-23 PROCEDURE — 99232 SBSQ HOSP IP/OBS MODERATE 35: CPT | Performed by: INTERNAL MEDICINE

## 2024-06-23 PROCEDURE — 1200000000 HC SEMI PRIVATE

## 2024-06-23 PROCEDURE — 85027 COMPLETE CBC AUTOMATED: CPT

## 2024-06-23 PROCEDURE — 6360000002 HC RX W HCPCS: Performed by: INTERNAL MEDICINE

## 2024-06-23 PROCEDURE — 80048 BASIC METABOLIC PNL TOTAL CA: CPT

## 2024-06-23 PROCEDURE — C9113 INJ PANTOPRAZOLE SODIUM, VIA: HCPCS | Performed by: INTERNAL MEDICINE

## 2024-06-23 PROCEDURE — 94640 AIRWAY INHALATION TREATMENT: CPT

## 2024-06-23 PROCEDURE — 6370000000 HC RX 637 (ALT 250 FOR IP): Performed by: NURSE PRACTITIONER

## 2024-06-23 RX ORDER — DOCUSATE SODIUM 100 MG/1
100 CAPSULE, LIQUID FILLED ORAL DAILY PRN
Status: DISCONTINUED | OUTPATIENT
Start: 2024-06-23 | End: 2024-06-27 | Stop reason: HOSPADM

## 2024-06-23 RX ADMIN — CARVEDILOL 3.12 MG: 3.12 TABLET, FILM COATED ORAL at 18:02

## 2024-06-23 RX ADMIN — IPRATROPIUM BROMIDE AND ALBUTEROL SULFATE 1 DOSE: 2.5; .5 SOLUTION RESPIRATORY (INHALATION) at 16:47

## 2024-06-23 RX ADMIN — PANTOPRAZOLE SODIUM 40 MG: 40 INJECTION, POWDER, FOR SOLUTION INTRAVENOUS at 21:05

## 2024-06-23 RX ADMIN — DOCUSATE SODIUM 100 MG: 100 CAPSULE, LIQUID FILLED ORAL at 15:18

## 2024-06-23 RX ADMIN — APIXABAN 5 MG: 5 TABLET, FILM COATED ORAL at 09:10

## 2024-06-23 RX ADMIN — PANTOPRAZOLE SODIUM 40 MG: 40 INJECTION, POWDER, FOR SOLUTION INTRAVENOUS at 09:10

## 2024-06-23 RX ADMIN — CARVEDILOL 3.12 MG: 3.12 TABLET, FILM COATED ORAL at 09:10

## 2024-06-23 RX ADMIN — ONDANSETRON 4 MG: 4 TABLET, ORALLY DISINTEGRATING ORAL at 09:09

## 2024-06-23 RX ADMIN — IPRATROPIUM BROMIDE AND ALBUTEROL SULFATE 1 DOSE: 2.5; .5 SOLUTION RESPIRATORY (INHALATION) at 07:55

## 2024-06-23 RX ADMIN — IPRATROPIUM BROMIDE AND ALBUTEROL SULFATE 1 DOSE: 2.5; .5 SOLUTION RESPIRATORY (INHALATION) at 21:21

## 2024-06-23 RX ADMIN — APIXABAN 5 MG: 5 TABLET, FILM COATED ORAL at 21:05

## 2024-06-23 RX ADMIN — ONDANSETRON 4 MG: 2 INJECTION INTRAMUSCULAR; INTRAVENOUS at 03:34

## 2024-06-23 RX ADMIN — ONDANSETRON 4 MG: 2 INJECTION INTRAMUSCULAR; INTRAVENOUS at 21:05

## 2024-06-23 RX ADMIN — IPRATROPIUM BROMIDE AND ALBUTEROL SULFATE 1 DOSE: 2.5; .5 SOLUTION RESPIRATORY (INHALATION) at 11:10

## 2024-06-23 RX ADMIN — ROSUVASTATIN CALCIUM 5 MG: 5 TABLET, FILM COATED ORAL at 21:05

## 2024-06-23 NOTE — PROGRESS NOTES
Patient has been swallowing her pills and has been requesting a food. Called Dr. Valdes and updated him about the patient. Ok to advance diet.

## 2024-06-23 NOTE — PROGRESS NOTES
[facility] Hospitalist Progress Note    Admitting Date and Time: 6/22/2024 10:54 AM  Admit Dx: Dysphagia [R13.10]  Dysphagia, unspecified type [R13.10]    Subjective:  Patient is being followed for Dysphagia [R13.10]  Dysphagia, unspecified type [R13.10]   Pt seen and examined this morning  No acute events overnight  States she feels better this morning.  Was able to swallow her pills with no issues    ROS: denies fever, chills, cp, sob, n/v, HA unless stated above.      apixaban  5 mg Oral BID    carvedilol  3.125 mg Oral BID WC    rosuvastatin  5 mg Oral Daily    sodium chloride flush  5-40 mL IntraVENous 2 times per day    ipratropium 0.5 mg-albuterol 2.5 mg  1 Dose Inhalation Q4H WA RT    pantoprazole  40 mg IntraVENous BID     docusate sodium, 100 mg, Daily PRN  sodium chloride flush, 5-40 mL, PRN  sodium chloride, , PRN  potassium chloride, 40 mEq, PRN   Or  potassium alternative oral replacement, 40 mEq, PRN   Or  potassium chloride, 10 mEq, PRN  magnesium sulfate, 2,000 mg, PRN  ondansetron, 4 mg, Q8H PRN   Or  ondansetron, 4 mg, Q6H PRN  polyethylene glycol, 17 g, Daily PRN  acetaminophen, 650 mg, Q6H PRN   Or  acetaminophen, 650 mg, Q6H PRN         Objective:    BP (!) 117/59   Pulse 70   Temp 97.8 °F (36.6 °C) (Oral)   Resp 17   Ht 1.549 m (5' 1\")   Wt 96.2 kg (212 lb)   SpO2 95%   BMI 40.06 kg/m²     General Appearance: alert and oriented to person, place and time and in no acute distress  Skin: warm and dry  Head: normocephalic and atraumatic  Eyes: pupils equal, round, and reactive to light, extraocular eye movements intact, conjunctivae normal  Neck: neck supple and non tender without mass   Pulmonary/Chest: clear to auscultation bilaterally- no wheezes, rales or rhonchi, normal air movement, no respiratory distress  Cardiovascular: normal rate, normal S1 and S2 and no carotid bruits  Abdomen: soft, non-tender, non-distended, normal bowel sounds, no masses or organomegaly  Extremities:  no cyanosis, no clubbing and no edema  Neurologic: no cranial nerve deficit and speech normal        Recent Labs     06/22/24  1115 06/23/24  0330    138   K 5.0 4.1    102   CO2 28 27   BUN 15 15   CREATININE 0.6 0.6   GLUCOSE 99 91   CALCIUM 9.3 8.9       Recent Labs     06/22/24  1115 06/23/24  0330   WBC 10.9 10.4   RBC 4.80 4.25   HGB 15.0 13.4   HCT 47.0 41.9   MCV 97.9 98.6   MCH 31.3 31.5   MCHC 31.9* 32.0   RDW 12.6 12.7    213   MPV 10.7 10.8         Assessment and Plan:     Principal Problem:    Dysphagia  Resolved Problems:    * No resolved hospital problems. *    Dysphagia.  Stated been happening on and off for the past year.  Unable to take pills, eat or drink for 2 days prior to admission.  Noted concern for reflux disease on CT neck.  Continue IV Protonix twice daily.  GI following, for esophagram tomorrow.  EGD pending cardiology clearance.  Trial FLD today then n.p.o. at midnight  Paroxysmal A-fib.  Currently rate controlled.  Continue Coreg and Eliquis  Moderate to severe aortic valve stenosis.  Cardiology consulted for clearance  COPD.  Not in acute exacerbations.  Breathing treatments as needed      NOTE: This report was transcribed using voice recognition software. Every effort was made to ensure accuracy; however, inadvertent computerized transcription errors may be present.  Electronically signed by Mai Valdes MD on 6/23/2024 at 3:33 PM

## 2024-06-23 NOTE — CONSULTS
Gastroenterology Consult Note   Merlene TAYLOR-JORGE-NAVEEN with Amanda Yee M.D. Consult Note    Date of Service: 6/23/2024  Reason for Consult: Chronic dysphagia  Requesting Physician: Dr. Valdes     CHIEF COMPLAINT: Dysphagia    History Obtained From:  patient, electronic medical record    HISTORY OF PRESENT ILLNESS:       Jayne Weaver is a 84 y.o. female with significant past medical history of COPD, hyperlipidemia, hypertension, nonrheumatic aortic valve stenosis, A-fib on Eliquis presenting to ED for difficulty swallowing foods and liquids and admitted with dysphagia.  Pt reports on Friday she made a casserole attempted to eat it but states she could not and ended up vomiting.  Patient states then Saturday morning she had a pancake with her medications felt as if the pancake was stuck mid esophagus leading her to vomit.  Patient states that she was not able to take her medications related to vomiting and feeling as if her medications were stuck in her throat.  States the only way to dislodge food or medications is to vomit.  Patient states that over the last year she has noticed that rice and corn bread with milk exacerbate dysphagia.  Reports that she has not had a bowel movement in 2 days but typically at has soft and brown daily bowel movements.  Patient states that she does use inhalers related to COPD, admits to rinsing mouth thoroughly after utilizing inhalers.  States that she does have a chronic cough and does frequently have thick sputum.  COPD is managed by Dr. Song. 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  Patient does have a history of A-fib is currently been on Eliquis over the last 6 months and follows with Dr. Norman, states upcoming appointment in July.  Currently denying any new medication, new supplements, infectious contacts, recent antibiotic usage, denies taking any NSAIDs.  No reports of fever,

## 2024-06-24 ENCOUNTER — APPOINTMENT (OUTPATIENT)
Dept: GENERAL RADIOLOGY | Age: 84
DRG: 392 | End: 2024-06-24
Payer: MEDICARE

## 2024-06-24 PROBLEM — Z79.01 CHRONIC ANTICOAGULATION: Status: ACTIVE | Noted: 2024-06-24

## 2024-06-24 PROBLEM — R06.09 DOE (DYSPNEA ON EXERTION): Status: ACTIVE | Noted: 2024-06-24

## 2024-06-24 PROBLEM — I48.19 PERSISTENT ATRIAL FIBRILLATION (HCC): Status: ACTIVE | Noted: 2024-06-24

## 2024-06-24 PROBLEM — I35.0 NONRHEUMATIC AORTIC VALVE STENOSIS: Status: ACTIVE | Noted: 2024-06-24

## 2024-06-24 LAB
ANION GAP SERPL CALCULATED.3IONS-SCNC: 8 MMOL/L (ref 7–16)
BUN SERPL-MCNC: 13 MG/DL (ref 6–23)
CALCIUM SERPL-MCNC: 8.9 MG/DL (ref 8.6–10.2)
CHLORIDE SERPL-SCNC: 102 MMOL/L (ref 98–107)
CO2 SERPL-SCNC: 28 MMOL/L (ref 22–29)
CREAT SERPL-MCNC: 0.6 MG/DL (ref 0.5–1)
EKG ATRIAL RATE: 79 BPM
EKG P AXIS: 55 DEGREES
EKG P-R INTERVAL: 272 MS
EKG Q-T INTERVAL: 424 MS
EKG QRS DURATION: 68 MS
EKG QTC CALCULATION (BAZETT): 486 MS
EKG R AXIS: -10 DEGREES
EKG T AXIS: 69 DEGREES
EKG VENTRICULAR RATE: 79 BPM
ERYTHROCYTE [DISTWIDTH] IN BLOOD BY AUTOMATED COUNT: 12.5 % (ref 11.5–15)
GFR, ESTIMATED: 88 ML/MIN/1.73M2
GLUCOSE SERPL-MCNC: 99 MG/DL (ref 74–99)
HCT VFR BLD AUTO: 43.8 % (ref 34–48)
HGB BLD-MCNC: 14 G/DL (ref 11.5–15.5)
MCH RBC QN AUTO: 31.4 PG (ref 26–35)
MCHC RBC AUTO-ENTMCNC: 32 G/DL (ref 32–34.5)
MCV RBC AUTO: 98.2 FL (ref 80–99.9)
PLATELET # BLD AUTO: 249 K/UL (ref 130–450)
PMV BLD AUTO: 10.6 FL (ref 7–12)
POTASSIUM SERPL-SCNC: 4.3 MMOL/L (ref 3.5–5)
RBC # BLD AUTO: 4.46 M/UL (ref 3.5–5.5)
SODIUM SERPL-SCNC: 138 MMOL/L (ref 132–146)
WBC OTHER # BLD: 12.8 K/UL (ref 4.5–11.5)

## 2024-06-24 PROCEDURE — 85027 COMPLETE CBC AUTOMATED: CPT

## 2024-06-24 PROCEDURE — 99223 1ST HOSP IP/OBS HIGH 75: CPT | Performed by: INTERNAL MEDICINE

## 2024-06-24 PROCEDURE — 6370000000 HC RX 637 (ALT 250 FOR IP): Performed by: INTERNAL MEDICINE

## 2024-06-24 PROCEDURE — 92526 ORAL FUNCTION THERAPY: CPT

## 2024-06-24 PROCEDURE — 74220 X-RAY XM ESOPHAGUS 1CNTRST: CPT

## 2024-06-24 PROCEDURE — 6360000002 HC RX W HCPCS: Performed by: INTERNAL MEDICINE

## 2024-06-24 PROCEDURE — 99232 SBSQ HOSP IP/OBS MODERATE 35: CPT | Performed by: STUDENT IN AN ORGANIZED HEALTH CARE EDUCATION/TRAINING PROGRAM

## 2024-06-24 PROCEDURE — C9113 INJ PANTOPRAZOLE SODIUM, VIA: HCPCS | Performed by: INTERNAL MEDICINE

## 2024-06-24 PROCEDURE — 80048 BASIC METABOLIC PNL TOTAL CA: CPT

## 2024-06-24 PROCEDURE — 6370000000 HC RX 637 (ALT 250 FOR IP): Performed by: RADIOLOGY

## 2024-06-24 PROCEDURE — 2500000003 HC RX 250 WO HCPCS: Performed by: RADIOLOGY

## 2024-06-24 PROCEDURE — 1200000000 HC SEMI PRIVATE

## 2024-06-24 PROCEDURE — 2580000003 HC RX 258: Performed by: INTERNAL MEDICINE

## 2024-06-24 PROCEDURE — 92610 EVALUATE SWALLOWING FUNCTION: CPT

## 2024-06-24 PROCEDURE — APPSS180 APP SPLIT SHARED TIME > 60 MINUTES: Performed by: NURSE PRACTITIONER

## 2024-06-24 PROCEDURE — 93010 ELECTROCARDIOGRAM REPORT: CPT | Performed by: INTERNAL MEDICINE

## 2024-06-24 PROCEDURE — 6370000000 HC RX 637 (ALT 250 FOR IP): Performed by: NURSE PRACTITIONER

## 2024-06-24 PROCEDURE — 94640 AIRWAY INHALATION TREATMENT: CPT

## 2024-06-24 RX ORDER — METOPROLOL SUCCINATE 25 MG/1
25 TABLET, EXTENDED RELEASE ORAL 2 TIMES DAILY
Status: DISCONTINUED | OUTPATIENT
Start: 2024-06-24 | End: 2024-06-27 | Stop reason: HOSPADM

## 2024-06-24 RX ADMIN — BARIUM SULFATE 1 TABLET: 700 TABLET ORAL at 13:01

## 2024-06-24 RX ADMIN — BARIUM SULFATE 140 ML: 980 POWDER, FOR SUSPENSION ORAL at 13:00

## 2024-06-24 RX ADMIN — IPRATROPIUM BROMIDE AND ALBUTEROL SULFATE 1 DOSE: 2.5; .5 SOLUTION RESPIRATORY (INHALATION) at 16:35

## 2024-06-24 RX ADMIN — CARVEDILOL 3.12 MG: 3.12 TABLET, FILM COATED ORAL at 08:53

## 2024-06-24 RX ADMIN — IPRATROPIUM BROMIDE AND ALBUTEROL SULFATE 1 DOSE: 2.5; .5 SOLUTION RESPIRATORY (INHALATION) at 19:24

## 2024-06-24 RX ADMIN — BARIUM SULFATE 176 G: 960 POWDER, FOR SUSPENSION ORAL at 13:01

## 2024-06-24 RX ADMIN — IPRATROPIUM BROMIDE AND ALBUTEROL SULFATE 1 DOSE: 2.5; .5 SOLUTION RESPIRATORY (INHALATION) at 08:44

## 2024-06-24 RX ADMIN — PANTOPRAZOLE SODIUM 40 MG: 40 INJECTION, POWDER, FOR SOLUTION INTRAVENOUS at 08:53

## 2024-06-24 RX ADMIN — PANTOPRAZOLE SODIUM 40 MG: 40 INJECTION, POWDER, FOR SOLUTION INTRAVENOUS at 20:33

## 2024-06-24 RX ADMIN — METOPROLOL SUCCINATE 25 MG: 25 TABLET, EXTENDED RELEASE ORAL at 20:33

## 2024-06-24 RX ADMIN — ANTACID/ANTIFLATULENT 1 EACH: 380; 550; 10; 10 GRANULE, EFFERVESCENT ORAL at 13:00

## 2024-06-24 RX ADMIN — ROSUVASTATIN CALCIUM 5 MG: 5 TABLET, FILM COATED ORAL at 20:33

## 2024-06-24 RX ADMIN — SODIUM CHLORIDE, POTASSIUM CHLORIDE, SODIUM LACTATE AND CALCIUM CHLORIDE: 600; 310; 30; 20 INJECTION, SOLUTION INTRAVENOUS at 17:50

## 2024-06-24 NOTE — PLAN OF CARE
Problem: Discharge Planning  Goal: Discharge to home or other facility with appropriate resources  6/23/2024 2149 by Lizz Staples, RN  Outcome: Progressing  6/23/2024 1859 by Benjamin Rowley, RN  Outcome: Progressing     Problem: Chronic Conditions and Co-morbidities  Goal: Patient's chronic conditions and co-morbidity symptoms are monitored and maintained or improved  Outcome: Progressing

## 2024-06-24 NOTE — PROGRESS NOTES
Speech Language Pathology  NAME:  Jayne Weaver  :  1940  DATE: 2024  ROOM:  [address] [place] [address]-A    Pt unavailable at 1209 for Clinical Swallow Evaluation    REASON:  Off unit for testing/ procedure; pt off floor at esophagram.     Will re-attempt as appropriate.       Thank You    Serenity Ellis M.S. CCC-SLP/L  Speech Language Pathologist  [phone]

## 2024-06-24 NOTE — PROGRESS NOTES
[facility] Hospitalist Progress Note    Admitting Date and Time: 6/22/2024 10:54 AM  Admit Dx: Dysphagia [R13.10]  Dysphagia, unspecified type [R13.10]    Subjective:  Patient is being followed for Dysphagia [R13.10]  Dysphagia, unspecified type [R13.10]   Pt feels better, NPO for procedure.  Per RN: No major concerns.    ROS: denies fever, chills, cp, sob, n/v, HA unless stated above.      metoprolol succinate  25 mg Oral BID    apixaban  5 mg Oral BID    rosuvastatin  5 mg Oral Daily    sodium chloride flush  5-40 mL IntraVENous 2 times per day    ipratropium 0.5 mg-albuterol 2.5 mg  1 Dose Inhalation Q4H WA RT    pantoprazole  40 mg IntraVENous BID     perflutren lipid microspheres, 1.5 mL, ONCE PRN  docusate sodium, 100 mg, Daily PRN  sodium chloride flush, 5-40 mL, PRN  sodium chloride, , PRN  potassium chloride, 40 mEq, PRN   Or  potassium alternative oral replacement, 40 mEq, PRN   Or  potassium chloride, 10 mEq, PRN  magnesium sulfate, 2,000 mg, PRN  ondansetron, 4 mg, Q8H PRN   Or  ondansetron, 4 mg, Q6H PRN  polyethylene glycol, 17 g, Daily PRN  acetaminophen, 650 mg, Q6H PRN   Or  acetaminophen, 650 mg, Q6H PRN         Objective:    /69   Pulse 84   Temp 97.7 °F (36.5 °C) (Oral)   Resp 17   Ht 1.549 m (5' 1\")   Wt 96.2 kg (212 lb)   SpO2 93%   BMI 40.06 kg/m²     General Appearance: alert and oriented to person, place and time and in no acute distress  Skin: warm and dry  Head: normocephalic and atraumatic  Eyes: pupils equal, round, and reactive to light, extraocular eye movements intact, conjunctivae normal  Neck: neck supple and non tender without mass   Pulmonary/Chest: clear to auscultation bilaterally- no wheezes, rales or rhonchi, normal air movement, no respiratory distress  Cardiovascular: Irregularly irregular, normal S1 and S2 and no carotid bruits  Abdomen: soft, non-tender, non-distended, normal bowel sounds, no masses or organomegaly  Extremities: no cyanosis, no  clubbing and no edema  Neurologic: no cranial nerve deficit and speech normal        Recent Labs     06/22/24  1115 06/23/24  0330 06/24/24  0230    138 138   K 5.0 4.1 4.3    102 102   CO2 28 27 28   BUN 15 15 13   CREATININE 0.6 0.6 0.6   GLUCOSE 99 91 99   CALCIUM 9.3 8.9 8.9       Recent Labs     06/22/24  1115 06/23/24  0330 06/24/24  0230   WBC 10.9 10.4 12.8*   RBC 4.80 4.25 4.46   HGB 15.0 13.4 14.0   HCT 47.0 41.9 43.8   MCV 97.9 98.6 98.2   MCH 31.3 31.5 31.4   MCHC 31.9* 32.0 32.0   RDW 12.6 12.7 12.5    213 249   MPV 10.7 10.8 10.6       Micro:  No components found for: \"BC)\"    Radiology:   XR CHEST (2 VW)    Result Date: 6/22/2024  EXAMINATION: TWO XRAY VIEWS OF THE CHEST 6/22/2024 12:38 pm COMPARISON: 04/13/2024 HISTORY: ORDERING SYSTEM PROVIDED HISTORY: Dysphagia TECHNOLOGIST PROVIDED HISTORY: Reason for exam:->Dysphagia FINDINGS: The lungs are without acute focal process.  There is no effusion or pneumothorax. The cardiomediastinal silhouette is without acute process. The osseous structures are without acute process.     No acute process.     CT SOFT TISSUE NECK W CONTRAST    Result Date: 6/22/2024  EXAMINATION: CT OF THE NECK SOFT TISSUE WITH CONTRAST  6/22/2024 TECHNIQUE: CT of the neck was performed with the administration of intravenous contrast. Multiplanar reformatted images are provided for review. Automated exposure control, iterative reconstruction, and/or weight based adjustment of the mA/kV was utilized to reduce the radiation dose to as low as reasonably achievable. COMPARISON: None. HISTORY: ORDERING SYSTEM PROVIDED HISTORY: dysphagia x 1 year TECHNOLOGIST PROVIDED HISTORY: Reason for exam:->dysphagia x 1 year Additional Contrast?->1 FINDINGS: PHARYNX/LARYNX:  The tonsillar pillars are normal in appearance.  The tongue is normal in appearance.  The valleculae, epiglottis, aryepiglottic folds and pyriform sinuses appear unremarkable.  The true and false vocal cords are

## 2024-06-24 NOTE — PROGRESS NOTES
PROGRESS NOTE        Patient Presents with/Seen in Consultation For      Reason for Consult: Chronic dysphagia   CHIEF COMPLAINT: Dysphagia   Subjective:     Patient seen sitting on edge of bed in no apparent distress.  No complaints of abdominal pain or nausea.  She had a bout of emesis noted by myself clear with foam and what appears to be chicken.  States she had chicken on Friday afternoon.  Plan of care discussed with patient.    Review of Systems  Aside from what was mentioned in the PMH and HPI, essentially unremarkable, all others negative.    Objective:     /69   Pulse 84   Temp 97.7 °F (36.5 °C) (Oral)   Resp 17   Ht 1.549 m (5' 1\")   Wt 96.2 kg (212 lb)   SpO2 93%   BMI 40.06 kg/m²     General appearance: alert, awake, laying in bed, and cooperative  Eyes: conjunctiva pale, sclera anicteric. PERRL.  Lungs: clear to auscultation bilaterally  Heart: regular rate and rhythm, no murmur, 2+ pulses; no edema  Abdomen: soft, non-tender; bowel sounds normal; no masses,  no organomegaly  Extremities: extremities without edema  Pulses: 2+ and symmetric  Skin: Skin color, texture, turgor normal.   Neurologic: Grossly normal    docusate sodium (COLACE) capsule 100 mg, Daily PRN  apixaban (ELIQUIS) tablet 5 mg, BID  carvedilol (COREG) tablet 3.125 mg, BID WC  rosuvastatin (CRESTOR) tablet 5 mg, Daily  sodium chloride flush 0.9 % injection 5-40 mL, 2 times per day  sodium chloride flush 0.9 % injection 5-40 mL, PRN  0.9 % sodium chloride infusion, PRN  potassium chloride (KLOR-CON M) extended release tablet 40 mEq, PRN   Or  potassium bicarb-citric acid (EFFER-K) effervescent tablet 40 mEq, PRN   Or  potassium chloride 10 mEq/100 mL IVPB (Peripheral Line), PRN  magnesium sulfate 2000 mg in 50 mL IVPB premix, PRN  ondansetron (ZOFRAN-ODT) disintegrating tablet 4 mg, Q8H PRN   Or  ondansetron (ZOFRAN) injection 4 mg, Q6H PRN  polyethylene glycol (GLYCOLAX) packet 17 g, Daily PRN  acetaminophen (TYLENOL)  Young-defer  A-fib on Eliquis-diagnosed over the last 6 months per patient-defer    Plan:   Esophagram ordered  EGD TBD pending esophagram results and cardiology input  NPO for now  Medical management per primary care team  Colace for bowel regimen   Continue pantoprazole 40 mg IV twice daily  Trend labs  Supportive care  Will follow      Note: This report was completed utilizing computer voice recognition software. Every effort has been made to ensure accuracy, however; inadvertent computerized transcription errors may be present.     Discussed with Dr. Yee  Plan per Dr. Joselito Quezada APRN-NP-C 6/24/2024  8:26 AM For Dr. Yee

## 2024-06-24 NOTE — CONSULTS
INPATIENT CARDIOLOGY CONSULT     Reason for Consult:  Clearance for EGD / Non rheumatic Aortic valve stenosis, AF on Eliquis     Cardiologist: Eloy Shrestha     Requesting Physician:  Dr. Cheung    Date of Consultation: 6/24/2024    HISTORY OF PRESENT ILLNESS:   Ms. Weaver is a 84 year old female who is known to Dr. Norman; most recently seen in OP on 4/17/2024.  See PMH below.    Patient stated that she lives alone in an apartment in assisted living.  She stated that she is very active participating in multiple events at assisted living.  She is able to walk up to 100 feet but feels chronic shortness of breath due to her \"COPD.\"  She stated that she frequently feels fatigued but is still able to do her normal activities.  She denies any exertional chest pain, palpitations or feeling of her heart racing.  She denies any lower extremity edema, abdominal bloating, early satiety, orthopnea but does report PND.    Patient stated that over the past year she has had difficulty swallowing rice and corn meal.  She states that over the past 6 months she has not had more difficulty swallowing food, beverages and even her pills.  Over the past 2 weeks she has had multiple episodes of difficulty swallowing her pills and food causing her to vomit.  Over the past 2 days she has been unable to eat or drink anything due to \"everything gets stuck in my throat.\"  She denies having chest pain or shortness of breath.  She stated otherwise she has felt mildly fatigued but has been \"more motivated recently.\"  Due to difficulty swallowing she presented to [facility] on 6/23/2024.     Upon arrival to the ED: Blood pressure 147/90, heart rate 95, afebrile, 94% on room air.  Labs: Sodium 139, potassium 5.0, BUN 15, creatinine 0.6, WBC 10.9, H/H stable, platelet count 258. High-sensitivity troponin 9 (no repeat). Chest x-ray: No acute process. CT soft tissue neck: 6/22/2024: No acute abnormality of the soft tissue structures of the neck,  Mai SANDS MD    0.9 % sodium chloride infusion, , IntraVENous, PRN, Mai Valdes MD    potassium chloride (KLOR-CON M) extended release tablet 40 mEq, 40 mEq, Oral, PRN **OR** potassium bicarb-citric acid (EFFER-K) effervescent tablet 40 mEq, 40 mEq, Oral, PRN **OR** potassium chloride 10 mEq/100 mL IVPB (Peripheral Line), 10 mEq, IntraVENous, PRN, Mai Valdes MD    magnesium sulfate 2000 mg in 50 mL IVPB premix, 2,000 mg, IntraVENous, PRN, Mai Valdes MD    ondansetron (ZOFRAN-ODT) disintegrating tablet 4 mg, 4 mg, Oral, Q8H PRN, 4 mg at 06/23/24 0909 **OR** ondansetron (ZOFRAN) injection 4 mg, 4 mg, IntraVENous, Q6H PRN, Mai Valdes MD, 4 mg at 06/23/24 2105    polyethylene glycol (GLYCOLAX) packet 17 g, 17 g, Oral, Daily PRN, Mai Valdes MD    acetaminophen (TYLENOL) tablet 650 mg, 650 mg, Oral, Q6H PRN **OR** acetaminophen (TYLENOL) suppository 650 mg, 650 mg, Rectal, Q6H PRN, Mai Valdes MD    ipratropium 0.5 mg-albuterol 2.5 mg (DUONEB) nebulizer solution 1 Dose, 1 Dose, Inhalation, Q4H WA RT, Mai Valdes MD, 1 Dose at 06/23/24 2121    pantoprazole (PROTONIX) injection 40 mg, 40 mg, IntraVENous, BID, Mai Valdes MD, 40 mg at 06/23/24 2105    lactated ringers IV soln infusion, , IntraVENous, Continuous, Mai Valdes MD, Last Rate: 100 mL/hr at 06/22/24 2101, Rate Verify at 06/22/24 2101    ALLERGIES:  Patient has no known allergies.    SOCIAL HISTORY:    Denies alcohol and illicit drug use  Denies tobacco use.  Patient was born in [place] and has lived in [place] since 1978.  Retired: Worked as a nurses aide and also a  in [place]    FAMILY HISTORY:   Non-contributory at this time due to patient's advanced age.     REVIEW OF SYSTEMS:     Negative except as noted above in HPI.    PHYSICAL EXAM:   /69   Pulse 84   Temp 97.7 °F (36.5 °C) (Oral)   Resp 17   Ht 1.549 m (5' 1\")   Wt 96.2 kg (212 lb)   SpO2 93%   BMI 40.06 kg/m²   CONST:  Well  and possible MICHAEL  HTN: controlled.   HLD, on statin therapy  Probable MICHAEL  COPD  Morbid Obesity: BMI 40.06 kg/m2  Hx of right lumpectomy   Hx of Cholecystectomy   Vitamin D deficiency   Colonoscopy 2/20/2017 (indicated for bleeding): Hemorrhoids and some mild diverticular changes but essentially normal colonoscopy with ileocecal valve.  Mild Leukocytosis       RECOMMENDATIONS:  Check ECHO to reassess LV function and AS  Change Coreg to Toprol-XL 25 mg BID  Okay to hold Eliquis if needed for EGD. Recommend resuming after procedure when okay with GI. GI service updated.   Monitor telemetry   Will follow     Above as per Dr. Shrestha -- A+P discussed and made in collaboration with him.  Further recommendations to follow      NOTE: This report was transcribed using voice recognition software. Every effort was made to ensure accuracy; however, inadvertent computerized transcription errors may be present.      Lizz Rudnicki, APRN-CNP  [facility]    -----------------------------------------------------------------------------------------------------------------------------------------------  CARDIOLOGY ATTENDING ATTESTATION (DR. SHRESTHA)  I spent > 51% of the total time involved with completing the encounter. The total time included the following:  Independently interviewing the patient (HPI, ROS, PMH, PSH, FMH, SH, allergies, and medications)  Independently performing a medically appropriate examination  Reviewing the above documentation completed by the L[facility]  Ordering medications, tests, and/or procedures  Formulating the assessment/plan and reviewing the rationale for the above recommendations  Reviewing available records, results of all previously ordered testing/procedures, and current problem list  Counseling/educating the patient  Coordinating care with other healthcare professionals  Communicating results to the patient's family/caregiver  Documenting clinical information in the patient's electronic health

## 2024-06-24 NOTE — PROGRESS NOTES
SPEECH/LANGUAGE PATHOLOGY  CLINICAL ASSESSMENT OF SWALLOWING FUNCTION   and PLAN OF CARE    PATIENT NAME:  Jayne Weaver  (female)     MRN:  [id number]    :  1940  (84 y.o.)  STATUS:  Inpatient: Room 0736/0736-A    TODAY'S DATE:  2024  REFERRING PROVIDER:   Dr. Valdes  SPECIFIC PROVIDER ORDER: SLP swallowing-dysphagia evaluation and treatment Date of order:  24  REASON FOR REFERRAL: Assess swallow function   EVALUATING THERAPIST: LUCI Khalil                 RESULTS:    DYSPHAGIA DIAGNOSIS:   Clinical indicators of functional oropharyngeal swallow for age/premorbid functioning at this time. However, patient reported multiple episodes of solid food sticking in throat followed by the inability to swallow liquids.    Due to ongoing varying symptoms, SLP recommend MBSS to assess oropharyngeal tolerance of solid consistencies.       DIET RECOMMENDATIONS:  Regular consistency solids (IDDSI level 7) with  thin liquids (IDDSI level 0) IF CLEARED BY PHYSICIAN     FEEDING RECOMMENDATIONS:     Assistance level:  No assistance needed      Compensatory strategies recommended: Thorough oral care to prevent colonization of oral bacteria   Upright in bed/ chair as tolerated  Fully alert for all PO      Discussed recommendations with:  patient nurse in person    SPEECH THERAPY  PLAN OF CARE   The dysphagia POC is established based on physician order, dysphagia diagnosis and results of clinical assessment     Meal time assessment for 1-2 sessions to provide diet modification and compensatory strategy implementation due to inconsistent episodes of clinical indicators of dysphagia during intake    Conditions Requiring Skilled Therapeutic Intervention for dysphagia:    Patient is performing below functional baseline d/t  current acute condition, respiratory compromise, multiple medications, and/or increased dependency upon caregivers.  Sensation of food sticking in throat     Specific dysphagia interventions to

## 2024-06-24 NOTE — PATIENT CARE CONFERENCE
[facility] Quality Flow/Interdisciplinary Rounds Progress Note        Quality Flow Rounds held on June 24, 2024    Disciplines Attending:  Bedside Nurse, , , and Nursing Unit Leadership    Jayne Weaver was admitted on 6/22/2024 10:54 AM    Anticipated Discharge Date:       Disposition:    Franco Score:  Franco Scale Score: 22    Readmission Risk              Risk of Unplanned Readmission:  10           Discussed patient goal for the day, patient clinical progression, and barriers to discharge.  The following Goal(s) of the Day/Commitment(s) have been identified:  for esophagram today, possible egd tomorrow. Await gi input. Discharge plan is home when medically stable      Yoselin Corona RN  June 24, 2024

## 2024-06-24 NOTE — PROGRESS NOTES
SPIRITUAL HEALTH SERVICES - [facility]  PROGRESS NOTE    Name: Jayne Weaver                Restorationist: Non-Restorationism   Anointed (Last Rites): NA    Referral: Spiritual Care Consult    Assessment:  Upon entering the room  observes The patient is sitting up on the edge of the bed, is receptive and easily engaged in conversation. The patient did not express any complaints of pain or discomfort at this time.      Intervention:  Attentive listening, words of support, encouragement, and reassurance as well as prayer mindful of the patient's needs and concerns for family and self.    Outcome:  Patient expressed a feeling of comfort and gratitude for the visit.    Plan:  Chaplains will remain available to offer spiritual and emotional support as needed.      Electronically signed by Chaplain Carroll, on 6/24/2024 at 11:49 AM.  Spiritual Care Department  [facility]  [phone]

## 2024-06-25 ENCOUNTER — ANESTHESIA EVENT (OUTPATIENT)
Dept: ENDOSCOPY | Age: 84
End: 2024-06-25
Payer: MEDICARE

## 2024-06-25 ENCOUNTER — APPOINTMENT (OUTPATIENT)
Age: 84
DRG: 392 | End: 2024-06-25
Payer: MEDICARE

## 2024-06-25 LAB
ANION GAP SERPL CALCULATED.3IONS-SCNC: 7 MMOL/L (ref 7–16)
BUN SERPL-MCNC: 15 MG/DL (ref 6–23)
CALCIUM SERPL-MCNC: 8.8 MG/DL (ref 8.6–10.2)
CHLORIDE SERPL-SCNC: 99 MMOL/L (ref 98–107)
CO2 SERPL-SCNC: 30 MMOL/L (ref 22–29)
CREAT SERPL-MCNC: 0.7 MG/DL (ref 0.5–1)
ECHO AO ASC DIAM: 2.7 CM
ECHO AO ASCENDING AORTA INDEX: 1.39 CM/M2
ECHO AO ROOT DIAM: 2.9 CM
ECHO AO ROOT INDEX: 1.49 CM/M2
ECHO AO SINUS VALSALVA DIAM: 2.9 CM
ECHO AO SINUS VALSALVA INDEX: 1.49 CM/M2
ECHO AV AREA PEAK VELOCITY: 0.8 CM2
ECHO AV AREA VTI: 0.8 CM2
ECHO AV AREA/BSA PEAK VELOCITY: 0.4 CM2/M2
ECHO AV AREA/BSA VTI: 0.4 CM2/M2
ECHO AV CUSP MM: 0.7 CM
ECHO AV MEAN GRADIENT: 35 MMHG
ECHO AV MEAN VELOCITY: 2.9 M/S
ECHO AV PEAK GRADIENT: 59 MMHG
ECHO AV PEAK VELOCITY: 3.9 M/S
ECHO AV VELOCITY RATIO: 0.26
ECHO AV VTI: 99.8 CM
ECHO BSA: 2.03 M2
ECHO EST RA PRESSURE: 8 MMHG
ECHO LA DIAMETER INDEX: 1.86 CM/M2
ECHO LA DIAMETER: 3.6 CM
ECHO LA TO AORTIC ROOT RATIO: 1.24
ECHO LA VOL A-L A2C: 71 ML (ref 22–52)
ECHO LA VOL A-L A4C: 105 ML (ref 22–52)
ECHO LA VOL MOD A2C: 68 ML (ref 22–52)
ECHO LA VOL MOD A4C: 101 ML (ref 22–52)
ECHO LA VOLUME AREA LENGTH: 87 ML
ECHO LA VOLUME INDEX A-L A2C: 37 ML/M2 (ref 16–34)
ECHO LA VOLUME INDEX A-L A4C: 54 ML/M2 (ref 16–34)
ECHO LA VOLUME INDEX AREA LENGTH: 45 ML/M2 (ref 16–34)
ECHO LA VOLUME INDEX MOD A2C: 35 ML/M2 (ref 16–34)
ECHO LA VOLUME INDEX MOD A4C: 52 ML/M2 (ref 16–34)
ECHO LV E' LATERAL VELOCITY: 8 CM/S
ECHO LV E' SEPTAL VELOCITY: 7 CM/S
ECHO LV EF PHYSICIAN: 60 %
ECHO LV FRACTIONAL SHORTENING: 29 % (ref 28–44)
ECHO LV INTERNAL DIMENSION DIASTOLE INDEX: 1.96 CM/M2
ECHO LV INTERNAL DIMENSION DIASTOLIC: 3.8 CM (ref 3.9–5.3)
ECHO LV INTERNAL DIMENSION SYSTOLIC INDEX: 1.39 CM/M2
ECHO LV INTERNAL DIMENSION SYSTOLIC: 2.7 CM
ECHO LV IVSD: 1.5 CM (ref 0.6–0.9)
ECHO LV IVSS: 1.5 CM
ECHO LV MASS 2D: 205.2 G (ref 67–162)
ECHO LV MASS INDEX 2D: 105.8 G/M2 (ref 43–95)
ECHO LV POSTERIOR WALL DIASTOLIC: 1.4 CM (ref 0.6–0.9)
ECHO LV POSTERIOR WALL SYSTOLIC: 1.5 CM
ECHO LV RELATIVE WALL THICKNESS RATIO: 0.74
ECHO LVOT AREA: 3.1 CM2
ECHO LVOT AV VTI INDEX: 0.25
ECHO LVOT DIAM: 2 CM
ECHO LVOT MEAN GRADIENT: 2 MMHG
ECHO LVOT PEAK GRADIENT: 4 MMHG
ECHO LVOT PEAK VELOCITY: 1 M/S
ECHO LVOT STROKE VOLUME INDEX: 41.1 ML/M2
ECHO LVOT SV: 79.8 ML
ECHO LVOT VTI: 25.4 CM
ECHO MV AREA PHT: 4.2 CM2
ECHO MV AREA VTI: 3.4 CM2
ECHO MV E DECELERATION TIME (DT): 202.2 MS
ECHO MV LVOT VTI INDEX: 0.93
ECHO MV MAX VELOCITY: 1.4 M/S
ECHO MV MEAN GRADIENT: 4 MMHG
ECHO MV MEAN VELOCITY: 0.9 M/S
ECHO MV PEAK GRADIENT: 8 MMHG
ECHO MV PRESSURE HALF TIME (PHT): 52.3 MS
ECHO MV VTI: 23.5 CM
ECHO PV MAX VELOCITY: 0.8 M/S
ECHO PV MEAN GRADIENT: 1 MMHG
ECHO PV MEAN VELOCITY: 0.6 M/S
ECHO PV PEAK GRADIENT: 3 MMHG
ECHO PV VTI: 18 CM
ECHO RIGHT VENTRICULAR SYSTOLIC PRESSURE (RVSP): 44 MMHG
ECHO RV INTERNAL DIMENSION: 4.1 CM
ECHO RV TAPSE: 1.7 CM (ref 1.7–?)
ECHO TV REGURGITANT MAX VELOCITY: 3.02 M/S
ECHO TV REGURGITANT PEAK GRADIENT: 36 MMHG
ERYTHROCYTE [DISTWIDTH] IN BLOOD BY AUTOMATED COUNT: 12.4 % (ref 11.5–15)
GFR, ESTIMATED: 86 ML/MIN/1.73M2
GLUCOSE SERPL-MCNC: 90 MG/DL (ref 74–99)
HCT VFR BLD AUTO: 39.5 % (ref 34–48)
HGB BLD-MCNC: 12.4 G/DL (ref 11.5–15.5)
MCH RBC QN AUTO: 31.1 PG (ref 26–35)
MCHC RBC AUTO-ENTMCNC: 31.4 G/DL (ref 32–34.5)
MCV RBC AUTO: 99 FL (ref 80–99.9)
PLATELET # BLD AUTO: 222 K/UL (ref 130–450)
PMV BLD AUTO: 11.2 FL (ref 7–12)
POTASSIUM SERPL-SCNC: 4.1 MMOL/L (ref 3.5–5)
RBC # BLD AUTO: 3.99 M/UL (ref 3.5–5.5)
SODIUM SERPL-SCNC: 136 MMOL/L (ref 132–146)
WBC OTHER # BLD: 10.5 K/UL (ref 4.5–11.5)

## 2024-06-25 PROCEDURE — 85027 COMPLETE CBC AUTOMATED: CPT

## 2024-06-25 PROCEDURE — 2580000003 HC RX 258: Performed by: INTERNAL MEDICINE

## 2024-06-25 PROCEDURE — 94640 AIRWAY INHALATION TREATMENT: CPT

## 2024-06-25 PROCEDURE — 80048 BASIC METABOLIC PNL TOTAL CA: CPT

## 2024-06-25 PROCEDURE — 6370000000 HC RX 637 (ALT 250 FOR IP): Performed by: INTERNAL MEDICINE

## 2024-06-25 PROCEDURE — C9113 INJ PANTOPRAZOLE SODIUM, VIA: HCPCS | Performed by: INTERNAL MEDICINE

## 2024-06-25 PROCEDURE — 6360000002 HC RX W HCPCS: Performed by: INTERNAL MEDICINE

## 2024-06-25 PROCEDURE — 92526 ORAL FUNCTION THERAPY: CPT

## 2024-06-25 PROCEDURE — 99232 SBSQ HOSP IP/OBS MODERATE 35: CPT | Performed by: STUDENT IN AN ORGANIZED HEALTH CARE EDUCATION/TRAINING PROGRAM

## 2024-06-25 PROCEDURE — 93306 TTE W/DOPPLER COMPLETE: CPT

## 2024-06-25 PROCEDURE — 6370000000 HC RX 637 (ALT 250 FOR IP): Performed by: NURSE PRACTITIONER

## 2024-06-25 PROCEDURE — 1200000000 HC SEMI PRIVATE

## 2024-06-25 PROCEDURE — 93306 TTE W/DOPPLER COMPLETE: CPT | Performed by: INTERNAL MEDICINE

## 2024-06-25 PROCEDURE — 99233 SBSQ HOSP IP/OBS HIGH 50: CPT | Performed by: INTERNAL MEDICINE

## 2024-06-25 RX ADMIN — METOPROLOL SUCCINATE 25 MG: 25 TABLET, EXTENDED RELEASE ORAL at 20:43

## 2024-06-25 RX ADMIN — SODIUM CHLORIDE, PRESERVATIVE FREE 10 ML: 5 INJECTION INTRAVENOUS at 20:44

## 2024-06-25 RX ADMIN — SODIUM CHLORIDE, PRESERVATIVE FREE 10 ML: 5 INJECTION INTRAVENOUS at 09:35

## 2024-06-25 RX ADMIN — IPRATROPIUM BROMIDE AND ALBUTEROL SULFATE 1 DOSE: 2.5; .5 SOLUTION RESPIRATORY (INHALATION) at 19:12

## 2024-06-25 RX ADMIN — IPRATROPIUM BROMIDE AND ALBUTEROL SULFATE 1 DOSE: 2.5; .5 SOLUTION RESPIRATORY (INHALATION) at 15:51

## 2024-06-25 RX ADMIN — ROSUVASTATIN CALCIUM 5 MG: 5 TABLET, FILM COATED ORAL at 20:43

## 2024-06-25 RX ADMIN — PANTOPRAZOLE SODIUM 40 MG: 40 INJECTION, POWDER, FOR SOLUTION INTRAVENOUS at 20:43

## 2024-06-25 RX ADMIN — PANTOPRAZOLE SODIUM 40 MG: 40 INJECTION, POWDER, FOR SOLUTION INTRAVENOUS at 09:35

## 2024-06-25 RX ADMIN — IPRATROPIUM BROMIDE AND ALBUTEROL SULFATE 1 DOSE: 2.5; .5 SOLUTION RESPIRATORY (INHALATION) at 12:57

## 2024-06-25 RX ADMIN — SODIUM CHLORIDE, POTASSIUM CHLORIDE, SODIUM LACTATE AND CALCIUM CHLORIDE: 600; 310; 30; 20 INJECTION, SOLUTION INTRAVENOUS at 03:56

## 2024-06-25 RX ADMIN — METOPROLOL SUCCINATE 25 MG: 25 TABLET, EXTENDED RELEASE ORAL at 09:35

## 2024-06-25 ASSESSMENT — ENCOUNTER SYMPTOMS
DYSPNEA ACTIVITY LEVEL: NO INTERVAL CHANGE
SHORTNESS OF BREATH: 1

## 2024-06-25 ASSESSMENT — LIFESTYLE VARIABLES: SMOKING_STATUS: 0

## 2024-06-25 ASSESSMENT — COPD QUESTIONNAIRES: CAT_SEVERITY: MODERATE

## 2024-06-25 NOTE — PATIENT CARE CONFERENCE
[facility] Quality Flow/Interdisciplinary Rounds Progress Note        Quality Flow Rounds held on June 25, 2024    Disciplines Attending:  Bedside Nurse, , , and Nursing Unit Leadership    Jayne Weaver was admitted on 6/22/2024 10:54 AM    Anticipated Discharge Date:       Disposition:    Franco Score:  Franco Scale Score: 22    Readmission Risk              Risk of Unplanned Readmission:  10           Discussed patient goal for the day, patient clinical progression, and barriers to discharge.  The following Goal(s) of the Day/Commitment(s) have been identified:  for egd tomorrow. Continue iv protonix.for echo per cardio. Discharge planning      Yoselin Corona RN  June 25, 2024

## 2024-06-25 NOTE — CARE COORDINATION
Met with patient about diagnosis and discharge plan of care. Pt admit for dysphagia. Esophogram done, swallow eval done. Plan for EGD with possible dilatation tomorrow, 6/26 per GI. Pt lives in 1 floor senior apt. Uses no DME. Pt still drives, independent. PCP is Dr Rankin. Declining needs for home. Will follow-mjo

## 2024-06-25 NOTE — ACP (ADVANCE CARE PLANNING)
Advance Care Planning   Healthcare Decision Maker:    Primary Decision Maker: Fermin Bailon - [phone]    Click here to complete Healthcare Decision Makers including selection of the Healthcare Decision Maker Relationship (ie \"Primary\").

## 2024-06-25 NOTE — PROGRESS NOTES
Physician Progress Note      PATIENT:               EDWINA COELHO  CSN #:                  [id number]  :                       1940  ADMIT DATE:       2024 10:54 AM  DISCH DATE:  RESPONDING  PROVIDER #:        Deepti Bauer MD          QUERY TEXT:    Patient admitted with Dysphagia, noted to have paroxysmal atrial fibrillation   and is maintained on Eliquis. If possible, please document in progress notes   and discharge summary if you are evaluating and/or treating any of the   following:?  ?  The medical record reflects the following:  Risk Factors: Age 84, Female, HTN  Clinical Indicators: H&P  Paroxysmal A-fib.  Currently rate controlled.  Treatment: Coreg and Eliquis    Thank you,  Dorita Broussard, [facility], [facility].  Options provided:  -- Secondary hypercoagulable state in a patient with atrial fibrillation  -- Other - I will add my own diagnosis  -- Disagree - Not applicable / Not valid  -- Disagree - Clinically unable to determine / Unknown  -- Refer to Clinical Documentation Reviewer    PROVIDER RESPONSE TEXT:    This patient has secondary hypercoagulable state in a patient with atrial   fibrillation.    Query created by: Dorita Broussard on 2024 2:01 AM      Electronically signed by:  Deepti Bauer MD 2024 12:23 PM

## 2024-06-25 NOTE — PROGRESS NOTES
SPEECH LANGUAGE PATHOLOGY  DAILY PROGRESS NOTE      PATIENT NAME:  Jayne Weaver      :  1940          TODAY'S DATE:  2024 ROOM:  [address] [place], [place] [address]A    Current Diet: ADULT DIET; Regular  Diet NPO Exceptions are: Sips of Water with Meds  Diet NPO Exceptions are: Sips of Water with Meds    Patient seen for ongoing dysphagia tx. Patient reported good toleration of meals following CSE previous date. Patient reported to SLP that she often eats while laying down at night. SLP discussed importance of proper positioning during all oral intake. SLP also educated patient on reflux precautions and safe swallowing strategies. Patient for EGD next date. Will continue to assess diet tolerance following EGD.     Recommendation: cont current diet       CPT code(s) 34351  dysphagia tx  Total minutes :  15 minutes    Serenity Ellis M.S. CCC-SLP/L  Speech Language Pathologist  [phone]

## 2024-06-25 NOTE — PROGRESS NOTES
PROGRESS NOTE        Patient Presents with/Seen in Consultation For      Reason for Consult: Chronic dysphagia   CHIEF COMPLAINT: Dysphagia   Subjective:     Patient seen sitting on edge of bed in no apparent distress.  Esophagram reviewed with patient.  No complaints of food stuck at this time.  Tolerated diet yesterday.  No abdominal pain, nausea or vomiting.  Plan of care discussed with patient.    Review of Systems  Aside from what was mentioned in the PMH and HPI, essentially unremarkable, all others negative.    Objective:     /69   Pulse (!) 102   Temp 97.5 °F (36.4 °C) (Oral)   Resp 16   Ht 1.549 m (5' 1\")   Wt 96.2 kg (212 lb)   SpO2 97%   BMI 40.06 kg/m²     General appearance: alert, awake, sitting on edge of bed, and cooperative  Eyes: conjunctiva pale, sclera anicteric. PERRL.  Lungs: clear to auscultation bilaterally  Heart: regular rate and rhythm, no murmur, 2+ pulses; no edema  Abdomen: soft, non-tender; bowel sounds normal; no masses,  no organomegaly  Extremities: extremities without edema  Pulses: 2+ and symmetric  Skin: Skin color, texture, turgor normal.   Neurologic: Grossly normal    perflutren lipid microspheres (DEFINITY) injection 1.5 mL, ONCE PRN  metoprolol succinate (TOPROL XL) extended release tablet 25 mg, BID  docusate sodium (COLACE) capsule 100 mg, Daily PRN  [Held by provider] apixaban (ELIQUIS) tablet 5 mg, BID  rosuvastatin (CRESTOR) tablet 5 mg, Daily  sodium chloride flush 0.9 % injection 5-40 mL, 2 times per day  sodium chloride flush 0.9 % injection 5-40 mL, PRN  0.9 % sodium chloride infusion, PRN  potassium chloride (KLOR-CON M) extended release tablet 40 mEq, PRN   Or  potassium bicarb-citric acid (EFFER-K) effervescent tablet 40 mEq, PRN   Or  potassium chloride 10 mEq/100 mL IVPB (Peripheral Line), PRN  magnesium sulfate 2000 mg in 50 mL IVPB premix, PRN  ondansetron (ZOFRAN-ODT) disintegrating tablet 4 mg, Q8H PRN   Or  ondansetron (ZOFRAN) injection 4 mg,  Q6H PRN  polyethylene glycol (GLYCOLAX) packet 17 g, Daily PRN  acetaminophen (TYLENOL) tablet 650 mg, Q6H PRN   Or  acetaminophen (TYLENOL) suppository 650 mg, Q6H PRN  ipratropium 0.5 mg-albuterol 2.5 mg (DUONEB) nebulizer solution 1 Dose, Q4H WA RT  pantoprazole (PROTONIX) injection 40 mg, BID  lactated ringers IV soln infusion, Continuous         Data Review  CBC:   Lab Results   Component Value Date/Time    WBC 10.5 06/25/2024 04:14 AM    RBC 3.99 06/25/2024 04:14 AM    HGB 12.4 06/25/2024 04:14 AM    HCT 39.5 06/25/2024 04:14 AM    MCV 99.0 06/25/2024 04:14 AM    MCH 31.1 06/25/2024 04:14 AM    MCHC 31.4 06/25/2024 04:14 AM    RDW 12.4 06/25/2024 04:14 AM     06/25/2024 04:14 AM    MPV 11.2 06/25/2024 04:14 AM     CMP:    Lab Results   Component Value Date/Time     06/25/2024 04:14 AM    K 4.1 06/25/2024 04:14 AM    CL 99 06/25/2024 04:14 AM    CO2 30 06/25/2024 04:14 AM    BUN 15 06/25/2024 04:14 AM    CREATININE 0.7 06/25/2024 04:14 AM    GFRAA >60 08/18/2022 01:19 PM    LABGLOM 86 06/25/2024 04:14 AM    LABGLOM >60 03/04/2024 09:48 AM    GLUCOSE 90 06/25/2024 04:14 AM    GLUCOSE 89 05/20/2011 01:43 PM    CALCIUM 8.8 06/25/2024 04:14 AM    BILITOT 0.7 06/22/2024 11:15 AM    ALKPHOS 68 06/22/2024 11:15 AM    AST 21 06/22/2024 11:15 AM    ALT 11 06/22/2024 11:15 AM     Hepatic Function Panel:    Lab Results   Component Value Date/Time    ALKPHOS 68 06/22/2024 11:15 AM    ALT 11 06/22/2024 11:15 AM    AST 21 06/22/2024 11:15 AM    BILITOT 0.7 06/22/2024 11:15 AM    BILIDIR <0.2 02/27/2019 12:00 PM    IBILI see below 02/27/2019 12:00 PM     No components found for: \"CHLPL\"    Lab Results   Component Value Date    TRIG 152 (H) 03/04/2024    TRIG 167 (H) 08/08/2023    TRIG 248 (H) 02/27/2023       Lab Results   Component Value Date    HDL 33 (L) 03/04/2024    HDL 35 (L) 08/08/2023    HDL 35 02/27/2023         Assessment:     Dysphagia  Reflux per CT scan  History of COPD-uses inhalers-defer  History

## 2024-06-25 NOTE — ANESTHESIA PRE PROCEDURE
Department of Anesthesiology  Preprocedure Note       Name:  Jayne Weaver   Age:  84 y.o.  :  1940                                          MRN:  [id number]         Date:  2024      Surgeon: Surgeon(s):  Ras Perez DO    Procedure: Procedure(s):  ESOPHAGOGASTRODUODENOSCOPY DILATATION    Medications prior to admission:   Prior to Admission medications    Medication Sig Start Date End Date Taking? Authorizing Provider   Budeson-Glycopyrrol-Formoterol (BREZTRI AEROSPHERE) 160-9-4.8 MCG/ACT AERO Inhale 2 puffs into the lungs in the morning and at bedtime 24   Brandin Lee,    carvedilol (COREG) 3.125 MG tablet Take 1 tablet by mouth 2 times daily (with meals) with meals 24   Navdeep Rankin DO   rosuvastatin (CRESTOR) 5 MG tablet Take 1 tablet by mouth daily 24   Moisés Kyle DO   apixaban (ELIQUIS) 5 MG TABS tablet Take 1 tablet by mouth 2 times daily 3/6/24   Vishal Shrestha MD   Multiple Vitamins-Minerals (CENTRUM PO) Take by mouth    Provider, MD Duc       Current medications:    Current Facility-Administered Medications   Medication Dose Route Frequency Provider Last Rate Last Admin    perflutren lipid microspheres (DEFINITY) injection 1.5 mL  1.5 mL IntraVENous ONCE PRN Lizz Chambers APRN - CNP        metoprolol succinate (TOPROL XL) extended release tablet 25 mg  25 mg Oral BID Lizz Chambers APRN - CNP   25 mg at 24 0935    docusate sodium (COLACE) capsule 100 mg  100 mg Oral Daily PRN Merlene Ratliff APRN - CNP   100 mg at 24 1518    [Held by provider] apixaban (ELIQUIS) tablet 5 mg  5 mg Oral BID Mai Valdes MD   5 mg at 24    rosuvastatin (CRESTOR) tablet 5 mg  5 mg Oral Daily Mai Valdes MD   5 mg at 24    sodium chloride flush 0.9 % injection 5-40 mL  5-40 mL IntraVENous 2 times per day Mai Valdes MD   10 mL at 24 0935    sodium chloride flush 0.9 % injection 5-40 mL  5-40 mL

## 2024-06-25 NOTE — PROGRESS NOTES
[facility] Hospitalist Progress Note    Admitting Date and Time: 6/22/2024 10:54 AM  Admit Dx: Dysphagia [R13.10]  Dysphagia, unspecified type [R13.10]    Subjective:  Patient is being followed for Dysphagia [R13.10]  Dysphagia, unspecified type [R13.10]   Pt feels better.  Per RN: No major concerns.    ROS: denies fever, chills, cp, sob, n/v, HA unless stated above.      metoprolol succinate  25 mg Oral BID    [Held by provider] apixaban  5 mg Oral BID    rosuvastatin  5 mg Oral Daily    sodium chloride flush  5-40 mL IntraVENous 2 times per day    ipratropium 0.5 mg-albuterol 2.5 mg  1 Dose Inhalation Q4H WA RT    pantoprazole  40 mg IntraVENous BID     perflutren lipid microspheres, 1.5 mL, ONCE PRN  docusate sodium, 100 mg, Daily PRN  sodium chloride flush, 5-40 mL, PRN  sodium chloride, , PRN  potassium chloride, 40 mEq, PRN   Or  potassium alternative oral replacement, 40 mEq, PRN   Or  potassium chloride, 10 mEq, PRN  magnesium sulfate, 2,000 mg, PRN  ondansetron, 4 mg, Q8H PRN   Or  ondansetron, 4 mg, Q6H PRN  polyethylene glycol, 17 g, Daily PRN  acetaminophen, 650 mg, Q6H PRN   Or  acetaminophen, 650 mg, Q6H PRN         Objective:    /65   Pulse (!) 102   Temp 97.9 °F (36.6 °C) (Oral)   Resp 16   Ht 1.549 m (5' 1\")   Wt 96.2 kg (212 lb)   SpO2 97%   BMI 40.06 kg/m²     General Appearance: alert and oriented to person, place and time and in no acute distress  Skin: warm and dry  Head: normocephalic and atraumatic  Eyes: pupils equal, round, and reactive to light, extraocular eye movements intact, conjunctivae normal  Neck: neck supple and non tender without mass   Pulmonary/Chest: clear to auscultation bilaterally- no wheezes, rales or rhonchi, normal air movement, no respiratory distress  Cardiovascular: Irregularly irregular, normal S1 and S2 and no carotid bruits  Abdomen: soft, non-tender, non-distended, normal bowel sounds, no masses or organomegaly  Extremities: no cyanosis, no  clubbing and no edema  Neurologic: no cranial nerve deficit and speech normal        Recent Labs     06/23/24  0330 06/24/24  0230 06/25/24  0414    138 136   K 4.1 4.3 4.1    102 99   CO2 27 28 30*   BUN 15 13 15   CREATININE 0.6 0.6 0.7   GLUCOSE 91 99 90   CALCIUM 8.9 8.9 8.8       Recent Labs     06/23/24  0330 06/24/24  0230 06/25/24  0414   WBC 10.4 12.8* 10.5   RBC 4.25 4.46 3.99   HGB 13.4 14.0 12.4   HCT 41.9 43.8 39.5   MCV 98.6 98.2 99.0   MCH 31.5 31.4 31.1   MCHC 32.0 32.0 31.4*   RDW 12.7 12.5 12.4    249 222   MPV 10.8 10.6 11.2       Micro:  No components found for: \"BC)\"    Radiology:   XR CHEST (2 VW)    Result Date: 6/22/2024  EXAMINATION: TWO XRAY VIEWS OF THE CHEST 6/22/2024 12:38 pm COMPARISON: 04/13/2024 HISTORY: ORDERING SYSTEM PROVIDED HISTORY: Dysphagia TECHNOLOGIST PROVIDED HISTORY: Reason for exam:->Dysphagia FINDINGS: The lungs are without acute focal process.  There is no effusion or pneumothorax. The cardiomediastinal silhouette is without acute process. The osseous structures are without acute process.     No acute process.     CT SOFT TISSUE NECK W CONTRAST    Result Date: 6/22/2024  EXAMINATION: CT OF THE NECK SOFT TISSUE WITH CONTRAST  6/22/2024 TECHNIQUE: CT of the neck was performed with the administration of intravenous contrast. Multiplanar reformatted images are provided for review. Automated exposure control, iterative reconstruction, and/or weight based adjustment of the mA/kV was utilized to reduce the radiation dose to as low as reasonably achievable. COMPARISON: None. HISTORY: ORDERING SYSTEM PROVIDED HISTORY: dysphagia x 1 year TECHNOLOGIST PROVIDED HISTORY: Reason for exam:->dysphagia x 1 year Additional Contrast?->1 FINDINGS: PHARYNX/LARYNX:  The tonsillar pillars are normal in appearance.  The tongue is normal in appearance.  The valleculae, epiglottis, aryepiglottic folds and pyriform sinuses appear unremarkable.  The true and false vocal cords are  ensure accuracy; however, inadvertent computerized transcription errors may be present.  Electronically signed by Deepti Cheung MD on 6/25/2024 at 5:51 PM

## 2024-06-25 NOTE — PROGRESS NOTES
INPATIENT CARDIOLOGY FOLLOW-UP    Name: Jayne Weaver    Age: 84 y.o.    Date of Admission: 6/22/2024 10:54 AM    Date of Service: 6/25/2024    Chief Complaint: Follow-up for preoperative cardiac evaluation, VHD, AF    Interim History:  Admitted for further evaluation of dysphagia. Currently with no chest pain, respiratory distress, or palpitations. AF with episodes of RVR on telemetry on admission -- currently SR, rate 80's. No new overnight cardiac complaints.    Review of Systems:   Cardiac: As per HPI  General: No fever, chills  Pulmonary: As per HPI  HEENT: No visual disturbances, difficult swallowing  GI: No nausea, vomiting  : No dysuria, hematuria  Endocrine: No thyroid disease or DM  Musculoskeletal: ACOSTA x 4, no focal motor deficits  Skin: Intact, no rashes  Neuro: No headache, seizures  Psych: Currently with no depression, anxiety    Problem List:  Patient Active Problem List   Diagnosis    Primary hypertension    Mixed hyperlipidemia    Fatigue    Chronic obstructive pulmonary disease, unspecified COPD type (HCC)    Seasonal allergies    Vitamin D deficiency    Valvular heart disease    Dysphagia    Nonrheumatic aortic valve stenosis    RAMOS (dyspnea on exertion)    Persistent atrial fibrillation (HCC)    Chronic anticoagulation       Allergies:  No Known Allergies    Current Medications:  Current Facility-Administered Medications   Medication Dose Route Frequency Provider Last Rate Last Admin    perflutren lipid microspheres (DEFINITY) injection 1.5 mL  1.5 mL IntraVENous ONCE PRN Lizz Chambers APRN - CNP        metoprolol succinate (TOPROL XL) extended release tablet 25 mg  25 mg Oral BID Lizz Chambers APRN - CNP   25 mg at 06/25/24 0935    docusate sodium (COLACE) capsule 100 mg  100 mg Oral Daily PRN Merlene Ratliff APRN - CNP   100 mg at 06/23/24 1518    [Held by provider] apixaban (ELIQUIS) tablet 5 mg  5 mg Oral BID Mai Valdes MD   5 mg at 06/23/24 2105    rosuvastatin  from a cardiology standpoint  Okay to hold Eliquis as needed for EGD -- resume once safe from a GI standpoint  Monitor labs  Aggressive risk factor modifications  Telemetry reviewed (AF --> SR)    Greater than 50 minutes was spent counseling the patient, reviewing the rationale for the above recommendations and reviewing the patient's current medication list, problem list and results of all previously ordered testing.    Vishal Shrestha MD  [facility] Cardiology

## 2024-06-26 ENCOUNTER — ANESTHESIA (OUTPATIENT)
Dept: ENDOSCOPY | Age: 84
End: 2024-06-26
Payer: MEDICARE

## 2024-06-26 LAB
ANION GAP SERPL CALCULATED.3IONS-SCNC: 9 MMOL/L (ref 7–16)
BUN SERPL-MCNC: 12 MG/DL (ref 6–23)
CALCIUM SERPL-MCNC: 9 MG/DL (ref 8.6–10.2)
CHLORIDE SERPL-SCNC: 101 MMOL/L (ref 98–107)
CO2 SERPL-SCNC: 27 MMOL/L (ref 22–29)
CREAT SERPL-MCNC: 0.7 MG/DL (ref 0.5–1)
ERYTHROCYTE [DISTWIDTH] IN BLOOD BY AUTOMATED COUNT: 12.5 % (ref 11.5–15)
GFR, ESTIMATED: 87 ML/MIN/1.73M2
GLUCOSE SERPL-MCNC: 100 MG/DL (ref 74–99)
HCT VFR BLD AUTO: 41.2 % (ref 34–48)
HGB BLD-MCNC: 12.9 G/DL (ref 11.5–15.5)
MCH RBC QN AUTO: 31.5 PG (ref 26–35)
MCHC RBC AUTO-ENTMCNC: 31.3 G/DL (ref 32–34.5)
MCV RBC AUTO: 100.5 FL (ref 80–99.9)
PLATELET # BLD AUTO: 186 K/UL (ref 130–450)
PMV BLD AUTO: 11.1 FL (ref 7–12)
POTASSIUM SERPL-SCNC: 4.5 MMOL/L (ref 3.5–5)
RBC # BLD AUTO: 4.1 M/UL (ref 3.5–5.5)
SODIUM SERPL-SCNC: 137 MMOL/L (ref 132–146)
WBC OTHER # BLD: 8.2 K/UL (ref 4.5–11.5)

## 2024-06-26 PROCEDURE — 2580000003 HC RX 258: Performed by: NURSE ANESTHETIST, CERTIFIED REGISTERED

## 2024-06-26 PROCEDURE — 2709999900 HC NON-CHARGEABLE SUPPLY: Performed by: INTERNAL MEDICINE

## 2024-06-26 PROCEDURE — 6370000000 HC RX 637 (ALT 250 FOR IP): Performed by: NURSE PRACTITIONER

## 2024-06-26 PROCEDURE — 0DB48ZX EXCISION OF ESOPHAGOGASTRIC JUNCTION, VIA NATURAL OR ARTIFICIAL OPENING ENDOSCOPIC, DIAGNOSTIC: ICD-10-PCS | Performed by: INTERNAL MEDICINE

## 2024-06-26 PROCEDURE — 3609017700 HC EGD DILATION GASTRIC/DUODENAL STRICTURE: Performed by: INTERNAL MEDICINE

## 2024-06-26 PROCEDURE — 1200000000 HC SEMI PRIVATE

## 2024-06-26 PROCEDURE — 85027 COMPLETE CBC AUTOMATED: CPT

## 2024-06-26 PROCEDURE — 6360000002 HC RX W HCPCS: Performed by: NURSE ANESTHETIST, CERTIFIED REGISTERED

## 2024-06-26 PROCEDURE — 99232 SBSQ HOSP IP/OBS MODERATE 35: CPT | Performed by: STUDENT IN AN ORGANIZED HEALTH CARE EDUCATION/TRAINING PROGRAM

## 2024-06-26 PROCEDURE — 99233 SBSQ HOSP IP/OBS HIGH 50: CPT | Performed by: INTERNAL MEDICINE

## 2024-06-26 PROCEDURE — 7100000011 HC PHASE II RECOVERY - ADDTL 15 MIN: Performed by: INTERNAL MEDICINE

## 2024-06-26 PROCEDURE — 6370000000 HC RX 637 (ALT 250 FOR IP): Performed by: INTERNAL MEDICINE

## 2024-06-26 PROCEDURE — 88312 SPECIAL STAINS GROUP 1: CPT

## 2024-06-26 PROCEDURE — 0DB78ZX EXCISION OF STOMACH, PYLORUS, VIA NATURAL OR ARTIFICIAL OPENING ENDOSCOPIC, DIAGNOSTIC: ICD-10-PCS | Performed by: INTERNAL MEDICINE

## 2024-06-26 PROCEDURE — 3700000001 HC ADD 15 MINUTES (ANESTHESIA): Performed by: INTERNAL MEDICINE

## 2024-06-26 PROCEDURE — 2580000003 HC RX 258: Performed by: INTERNAL MEDICINE

## 2024-06-26 PROCEDURE — C1726 CATH, BAL DIL, NON-VASCULAR: HCPCS | Performed by: INTERNAL MEDICINE

## 2024-06-26 PROCEDURE — 7100000010 HC PHASE II RECOVERY - FIRST 15 MIN: Performed by: INTERNAL MEDICINE

## 2024-06-26 PROCEDURE — 80048 BASIC METABOLIC PNL TOTAL CA: CPT

## 2024-06-26 PROCEDURE — C9113 INJ PANTOPRAZOLE SODIUM, VIA: HCPCS | Performed by: INTERNAL MEDICINE

## 2024-06-26 PROCEDURE — 88305 TISSUE EXAM BY PATHOLOGIST: CPT

## 2024-06-26 PROCEDURE — 3700000000 HC ANESTHESIA ATTENDED CARE: Performed by: INTERNAL MEDICINE

## 2024-06-26 PROCEDURE — 6360000002 HC RX W HCPCS: Performed by: INTERNAL MEDICINE

## 2024-06-26 PROCEDURE — 94640 AIRWAY INHALATION TREATMENT: CPT

## 2024-06-26 PROCEDURE — 0D758DZ DILATION OF ESOPHAGUS WITH INTRALUMINAL DEVICE, VIA NATURAL OR ARTIFICIAL OPENING ENDOSCOPIC: ICD-10-PCS | Performed by: INTERNAL MEDICINE

## 2024-06-26 RX ORDER — SODIUM CHLORIDE 0.9 % (FLUSH) 0.9 %
5-40 SYRINGE (ML) INJECTION PRN
Status: DISCONTINUED | OUTPATIENT
Start: 2024-06-26 | End: 2024-06-26 | Stop reason: HOSPADM

## 2024-06-26 RX ORDER — SODIUM CHLORIDE 9 MG/ML
INJECTION, SOLUTION INTRAVENOUS PRN
Status: DISCONTINUED | OUTPATIENT
Start: 2024-06-26 | End: 2024-06-26 | Stop reason: HOSPADM

## 2024-06-26 RX ORDER — NALOXONE HYDROCHLORIDE 0.4 MG/ML
INJECTION, SOLUTION INTRAMUSCULAR; INTRAVENOUS; SUBCUTANEOUS PRN
Status: DISCONTINUED | OUTPATIENT
Start: 2024-06-26 | End: 2024-06-26 | Stop reason: HOSPADM

## 2024-06-26 RX ORDER — PROCHLORPERAZINE EDISYLATE 5 MG/ML
5 INJECTION INTRAMUSCULAR; INTRAVENOUS
Status: DISCONTINUED | OUTPATIENT
Start: 2024-06-26 | End: 2024-06-26 | Stop reason: HOSPADM

## 2024-06-26 RX ORDER — DIPHENHYDRAMINE HYDROCHLORIDE 50 MG/ML
12.5 INJECTION INTRAMUSCULAR; INTRAVENOUS
Status: DISCONTINUED | OUTPATIENT
Start: 2024-06-26 | End: 2024-06-26 | Stop reason: HOSPADM

## 2024-06-26 RX ORDER — FENTANYL CITRATE 50 UG/ML
25 INJECTION, SOLUTION INTRAMUSCULAR; INTRAVENOUS EVERY 5 MIN PRN
Status: DISCONTINUED | OUTPATIENT
Start: 2024-06-26 | End: 2024-06-26 | Stop reason: HOSPADM

## 2024-06-26 RX ORDER — PROPOFOL 10 MG/ML
INJECTION, EMULSION INTRAVENOUS PRN
Status: DISCONTINUED | OUTPATIENT
Start: 2024-06-26 | End: 2024-06-26 | Stop reason: SDUPTHER

## 2024-06-26 RX ORDER — MEPERIDINE HYDROCHLORIDE 25 MG/ML
12.5 INJECTION INTRAMUSCULAR; INTRAVENOUS; SUBCUTANEOUS ONCE
Status: DISCONTINUED | OUTPATIENT
Start: 2024-06-26 | End: 2024-06-26 | Stop reason: HOSPADM

## 2024-06-26 RX ORDER — LABETALOL HYDROCHLORIDE 5 MG/ML
5 INJECTION, SOLUTION INTRAVENOUS
Status: DISCONTINUED | OUTPATIENT
Start: 2024-06-26 | End: 2024-06-26 | Stop reason: HOSPADM

## 2024-06-26 RX ORDER — SODIUM CHLORIDE 0.9 % (FLUSH) 0.9 %
5-40 SYRINGE (ML) INJECTION EVERY 12 HOURS SCHEDULED
Status: DISCONTINUED | OUTPATIENT
Start: 2024-06-26 | End: 2024-06-26 | Stop reason: HOSPADM

## 2024-06-26 RX ORDER — HYDRALAZINE HYDROCHLORIDE 20 MG/ML
5 INJECTION INTRAMUSCULAR; INTRAVENOUS
Status: DISCONTINUED | OUTPATIENT
Start: 2024-06-26 | End: 2024-06-26 | Stop reason: HOSPADM

## 2024-06-26 RX ORDER — SODIUM CHLORIDE 9 MG/ML
INJECTION, SOLUTION INTRAVENOUS CONTINUOUS PRN
Status: DISCONTINUED | OUTPATIENT
Start: 2024-06-26 | End: 2024-06-26 | Stop reason: SDUPTHER

## 2024-06-26 RX ADMIN — SODIUM CHLORIDE, PRESERVATIVE FREE 10 ML: 5 INJECTION INTRAVENOUS at 09:56

## 2024-06-26 RX ADMIN — PANTOPRAZOLE SODIUM 40 MG: 40 INJECTION, POWDER, FOR SOLUTION INTRAVENOUS at 08:15

## 2024-06-26 RX ADMIN — PANTOPRAZOLE SODIUM 40 MG: 40 INJECTION, POWDER, FOR SOLUTION INTRAVENOUS at 20:30

## 2024-06-26 RX ADMIN — DOCUSATE SODIUM 100 MG: 100 CAPSULE, LIQUID FILLED ORAL at 20:37

## 2024-06-26 RX ADMIN — METOPROLOL SUCCINATE 25 MG: 25 TABLET, EXTENDED RELEASE ORAL at 20:28

## 2024-06-26 RX ADMIN — PROPOFOL 220 MG: 10 INJECTION, EMULSION INTRAVENOUS at 13:44

## 2024-06-26 RX ADMIN — IPRATROPIUM BROMIDE AND ALBUTEROL SULFATE 1 DOSE: 2.5; .5 SOLUTION RESPIRATORY (INHALATION) at 09:27

## 2024-06-26 RX ADMIN — IPRATROPIUM BROMIDE AND ALBUTEROL SULFATE 1 DOSE: 2.5; .5 SOLUTION RESPIRATORY (INHALATION) at 16:15

## 2024-06-26 RX ADMIN — IPRATROPIUM BROMIDE AND ALBUTEROL SULFATE 1 DOSE: 2.5; .5 SOLUTION RESPIRATORY (INHALATION) at 19:33

## 2024-06-26 RX ADMIN — METOPROLOL SUCCINATE 25 MG: 25 TABLET, EXTENDED RELEASE ORAL at 08:15

## 2024-06-26 RX ADMIN — SODIUM CHLORIDE: 9 INJECTION, SOLUTION INTRAVENOUS at 13:44

## 2024-06-26 RX ADMIN — SODIUM CHLORIDE, PRESERVATIVE FREE 10 ML: 5 INJECTION INTRAVENOUS at 20:33

## 2024-06-26 RX ADMIN — ROSUVASTATIN CALCIUM 5 MG: 5 TABLET, FILM COATED ORAL at 20:28

## 2024-06-26 ASSESSMENT — PAIN - FUNCTIONAL ASSESSMENT: PAIN_FUNCTIONAL_ASSESSMENT: 0-10

## 2024-06-26 NOTE — PROCEDURES
Procedure:    Esophagogastroduodenoscopy    Indication:    dysphagia    Consent:   Informed consent was obtained from the patient including and not limited to risk of perforation, aspiration of gastric contents or teeth, bleeding, infection, dental breakage, ileus, need for surgery, or worst case death.    Sedation  Endoscope was advanced easily through mouth to second portion of duodenum    Oropharynx:    views are limited but grossly normal.    Esophagus:   Mild tortuosity to the esophagus  3 small ulcerations just proximal to the GEJ, appears stasis in nature  Peptic stricture noted just above GEJ, bx performed  Pan Esophageal dilation with TTS balloon dilator up to 18mm, starting at 15mm.       Stomach:   Antrum is normal, biopsies of antrum obtained to r/o H.Pylori    Gastric body is normal.    Retroflexed views showed normal fundus and cardia with exception of a HH.     Duodenum: Bulb is normal.     Second portion of duodenum is normal.  No fresh of old blood.      EBL - none    IMPRESSION AND PLAN:   1. Mildly tortuous esophagus  2. Hiatal hernia  3. 3 small stasis ulcerations with no high risk stigmata of bleeding, bx and dilation was performed    I did try to call next of kin to discuss findings, I will attempt again tomorrow. BID PPI for 8wks then ok for Qd. She is ok for ADAT and resuming of medications including anti-coagulation/antiplatelets. GI will follow in the morning. Please call with any questions or concerns.     Ras Perez, DO  6/27/2024  7:32 AM          HH  Distal esophageal stasis ulcer  Dilation, and bx

## 2024-06-26 NOTE — PROGRESS NOTES
Patient report called to 7th floor RN; family waiting room notified of patient transfer. Transport requested. ALDAIR Fall.

## 2024-06-26 NOTE — CARE COORDINATION
Updated plan of care. NPO for EGD today. Iv fluids. Plan remains home with no needs. Will follow-mjo

## 2024-06-26 NOTE — PROGRESS NOTES
INPATIENT CARDIOLOGY FOLLOW-UP    Name: Jayne Weaver    Age: 84 y.o.    Date of Admission: 6/22/2024 10:54 AM    Date of Service: 6/26/2024    Chief Complaint: Follow-up for preoperative cardiac evaluation, VHD, AF    Interim History:  Admitted for further evaluation of dysphagia --> EGD pending. Currently with no chest pain, respiratory distress, or palpitations. AF with episodes of RVR on telemetry on admission -- currently SR, rate 80's. No new overnight cardiac complaints.    Review of Systems:   Cardiac: As per HPI  General: No fever, chills  Pulmonary: As per HPI  HEENT: No visual disturbances, difficult swallowing  GI: No nausea, vomiting  : No dysuria, hematuria  Endocrine: No thyroid disease or DM  Musculoskeletal: ACOSTA x 4, no focal motor deficits  Skin: Intact, no rashes  Neuro: No headache, seizures  Psych: Currently with no depression, anxiety    Problem List:  Patient Active Problem List   Diagnosis    Primary hypertension    Mixed hyperlipidemia    Fatigue    Chronic obstructive pulmonary disease, unspecified COPD type (HCC)    Seasonal allergies    Vitamin D deficiency    Valvular heart disease    Dysphagia    Nonrheumatic aortic valve stenosis    RAMOS (dyspnea on exertion)    Persistent atrial fibrillation (HCC)    Chronic anticoagulation       Allergies:  No Known Allergies    Current Medications:  Current Facility-Administered Medications   Medication Dose Route Frequency Provider Last Rate Last Admin    perflutren lipid microspheres (DEFINITY) injection 1.5 mL  1.5 mL IntraVENous ONCE PRN Lizz Chambers APRN - CNP        metoprolol succinate (TOPROL XL) extended release tablet 25 mg  25 mg Oral BID Lizz Chambers APRN - CNP   25 mg at 06/25/24 2043    docusate sodium (COLACE) capsule 100 mg  100 mg Oral Daily PRN Merlene Ratliff APRN - CNP   100 mg at 06/23/24 1518    [Held by provider] apixaban (ELIQUIS) tablet 5 mg  5 mg Oral BID Mai Valdes MD   5 mg at 06/23/24 2105     mL/m2.    ASSESSMENT / PLAN:  Dysphagia / consulted for cardiac risk assessment prior to EGD for dysphagia  Esophagram (6/24/24): No evidence for stenosis. Laryngeal penetration of thick liquid barium without evidence of aspiration.  EGD pending  Moderate-severe AS on TTE 9/2023 --> severe AS on TTE 6/25/24  Mild TR / PHTN  Persistent atrial fibrillation: (diagnosed in 2/2024): in and out of AF this admission  Chronic anticoagulation with eliquis  Chronic RAMOS -- multifactorial  HTN: controlled.   HLD, on statin therapy  Probable MICHAEL  COPD  Obesity: BMI 40.06 kg/m2  Hx of right lumpectomy   Hx of cholecystectomy   Vitamin D deficiency   Colonoscopy 2/20/2017 (indicated for bleeding): hemorrhoids and some mild diverticular changes but essentially normal colonoscopy with ileocecal valve    Results of 6/25/24 echocardiogram reviewed with the patient today -- outpatient valve clinic evaluation  Switched from coreg 3.125 mg BID to Toprol XL 25 mg BID on 6/24/24 -- continue  She may proceed with EGD from a cardiology standpoint  Okay to hold Eliquis as needed for EGD -- resume once safe from a GI standpoint  Monitor labs  Aggressive risk factor modifications  Telemetry reviewed (AF --> SR)    Greater than 50 minutes was spent counseling the patient, reviewing the rationale for the above recommendations and reviewing the patient's current medication list, problem list and results of all previously ordered testing.    Vishal Shrestha MD  [facility] Cardiology

## 2024-06-26 NOTE — PLAN OF CARE
Problem: Discharge Planning  Goal: Discharge to home or other facility with appropriate resources  6/26/2024 1137 by Margo Parker, RN  Outcome: Progressing  6/25/2024 2326 by Zee Trevino, RN  Outcome: Progressing     Problem: Chronic Conditions and Co-morbidities  Goal: Patient's chronic conditions and co-morbidity symptoms are monitored and maintained or improved  6/26/2024 1137 by Margo Parker RN  Outcome: Progressing  6/25/2024 2326 by Zee Trevino, RN  Outcome: Progressing

## 2024-06-26 NOTE — ANESTHESIA POSTPROCEDURE EVALUATION
Department of Anesthesiology  Postprocedure Note    Patient: Jayne Weaver  MRN: [id number]  YOB: 1940  Date of evaluation: 6/26/2024    Procedure Summary       Date: 06/26/24 Room / Location: [place] [id number] / [facility]    Anesthesia Start: 1344 Anesthesia Stop: 1358    Procedures:       ESOPHAGOGASTRODUODENOSCOPY DILATATION      ESOPHAGOGASTRODUODENOSCOPY BIOPSY Diagnosis:       Dysphagia, unspecified type      (Dysphagia, unspecified type [R13.10])    Surgeons: Ras Perez DO Responsible Provider: Bobby Newby DO    Anesthesia Type: MAC ASA Status: 4            Anesthesia Type: No value filed.    Raimundo Phase I: Raimundo Score: 10    Raimundo Phase II:      Anesthesia Post Evaluation    Patient location during evaluation: bedside  Patient participation: complete - patient participated  Level of consciousness: awake and alert  Pain score: 0  Airway patency: patent  Nausea & Vomiting: no nausea and no vomiting  Cardiovascular status: blood pressure returned to baseline  Respiratory status: acceptable  Hydration status: euvolemic  Pain management: adequate        No notable events documented.

## 2024-06-26 NOTE — PROGRESS NOTES
[facility] Hospitalist Progress Note    Admitting Date and Time: 6/22/2024 10:54 AM  Admit Dx: Dysphagia [R13.10]  Dysphagia, unspecified type [R13.10]    Subjective:  Patient is being followed for Dysphagia [R13.10]  Dysphagia, unspecified type [R13.10]   Pt feels better. Npo for procedure.  Per RN: No major concerns.    ROS: denies fever, chills, cp, sob, n/v, HA unless stated above.      metoprolol succinate  25 mg Oral BID    [Held by provider] apixaban  5 mg Oral BID    rosuvastatin  5 mg Oral Daily    sodium chloride flush  5-40 mL IntraVENous 2 times per day    ipratropium 0.5 mg-albuterol 2.5 mg  1 Dose Inhalation Q4H WA RT    pantoprazole  40 mg IntraVENous BID     perflutren lipid microspheres, 1.5 mL, ONCE PRN  docusate sodium, 100 mg, Daily PRN  sodium chloride flush, 5-40 mL, PRN  sodium chloride, , PRN  potassium chloride, 40 mEq, PRN   Or  potassium alternative oral replacement, 40 mEq, PRN   Or  potassium chloride, 10 mEq, PRN  magnesium sulfate, 2,000 mg, PRN  ondansetron, 4 mg, Q8H PRN   Or  ondansetron, 4 mg, Q6H PRN  polyethylene glycol, 17 g, Daily PRN  acetaminophen, 650 mg, Q6H PRN   Or  acetaminophen, 650 mg, Q6H PRN         Objective:    BP (!) 158/65   Pulse 68   Temp 97.4 °F (36.3 °C) (Temporal)   Resp 16   Ht 1.549 m (5' 1\")   Wt 96.2 kg (212 lb)   SpO2 93%   BMI 40.06 kg/m²     General Appearance: alert and oriented to person, place and time and in no acute distress  Skin: warm and dry  Head: normocephalic and atraumatic  Eyes: pupils equal, round, and reactive to light, extraocular eye movements intact, conjunctivae normal  Neck: neck supple and non tender without mass   Pulmonary/Chest: clear to auscultation bilaterally- no wheezes, rales or rhonchi, normal air movement, no respiratory distress  Cardiovascular: Irregularly irregular, normal S1 and S2 and no carotid bruits  Abdomen: soft, non-tender, non-distended, normal bowel sounds, no masses or  organomegaly  Extremities: no cyanosis, no clubbing and no edema  Neurologic: no cranial nerve deficit and speech normal        Recent Labs     06/24/24  0230 06/25/24  0414 06/26/24  0507    136 137   K 4.3 4.1 4.5    99 101   CO2 28 30* 27   BUN 13 15 12   CREATININE 0.6 0.7 0.7   GLUCOSE 99 90 100*   CALCIUM 8.9 8.8 9.0       Recent Labs     06/24/24  0230 06/25/24  0414 06/26/24  0507   WBC 12.8* 10.5 8.2   RBC 4.46 3.99 4.10   HGB 14.0 12.4 12.9   HCT 43.8 39.5 41.2   MCV 98.2 99.0 100.5*   MCH 31.4 31.1 31.5   MCHC 32.0 31.4* 31.3*   RDW 12.5 12.4 12.5    222 186   MPV 10.6 11.2 11.1       Micro:  No components found for: \"BC)\"    Radiology:   XR CHEST (2 VW)    Result Date: 6/22/2024  EXAMINATION: TWO XRAY VIEWS OF THE CHEST 6/22/2024 12:38 pm COMPARISON: 04/13/2024 HISTORY: ORDERING SYSTEM PROVIDED HISTORY: Dysphagia TECHNOLOGIST PROVIDED HISTORY: Reason for exam:->Dysphagia FINDINGS: The lungs are without acute focal process.  There is no effusion or pneumothorax. The cardiomediastinal silhouette is without acute process. The osseous structures are without acute process.     No acute process.     CT SOFT TISSUE NECK W CONTRAST    Result Date: 6/22/2024  EXAMINATION: CT OF THE NECK SOFT TISSUE WITH CONTRAST  6/22/2024 TECHNIQUE: CT of the neck was performed with the administration of intravenous contrast. Multiplanar reformatted images are provided for review. Automated exposure control, iterative reconstruction, and/or weight based adjustment of the mA/kV was utilized to reduce the radiation dose to as low as reasonably achievable. COMPARISON: None. HISTORY: ORDERING SYSTEM PROVIDED HISTORY: dysphagia x 1 year TECHNOLOGIST PROVIDED HISTORY: Reason for exam:->dysphagia x 1 year Additional Contrast?->1 FINDINGS: PHARYNX/LARYNX:  The tonsillar pillars are normal in appearance.  The tongue is normal in appearance.  The valleculae, epiglottis, aryepiglottic folds and pyriform sinuses appear

## 2024-06-26 NOTE — PROGRESS NOTES
[facility] Quality Flow/Interdisciplinary Rounds Progress Note        Quality Flow Rounds held on June 26, 2024    Disciplines Attending:  Bedside Nurse, , , Nursing Unit Leadership, and      Jayne Weaver was admitted on 6/22/2024 10:54 AM    Anticipated Discharge Date:   6/26/2024    Disposition: Home     Franco Score:  Franco Scale Score: 20    Readmission Risk              Risk of Unplanned Readmission:  11           Discussed patient goal for the day, patient clinical progression, and barriers to discharge.  The following Goal(s) of the Day/Commitment(s) have been identified:  Code Status - Clarify Status  Patient to remain as a full code.       Brinda Davies RN  June 26, 2024

## 2024-06-26 NOTE — PROGRESS NOTES
Patient for EGD today for dysphagia with Dr. Perez.  Additional questions and concerns addressed.  Labs and chart reviewed.  Consents have been signed.  Okay to proceed.    BRANDON Singh CNP 6/26/2024 11:02 AM

## 2024-06-26 NOTE — PROGRESS NOTES
Dr. Juan with cardiology notified about patient being in and out of Afib for shift and recent tele strip called by CMR for 1st degree heart block.

## 2024-06-27 VITALS
WEIGHT: 212 LBS | SYSTOLIC BLOOD PRESSURE: 125 MMHG | DIASTOLIC BLOOD PRESSURE: 75 MMHG | HEART RATE: 78 BPM | TEMPERATURE: 97.9 F | RESPIRATION RATE: 18 BRPM | HEIGHT: 61 IN | OXYGEN SATURATION: 99 % | BODY MASS INDEX: 40.02 KG/M2

## 2024-06-27 LAB
ANION GAP SERPL CALCULATED.3IONS-SCNC: 10 MMOL/L (ref 7–16)
BUN SERPL-MCNC: 11 MG/DL (ref 6–23)
CALCIUM SERPL-MCNC: 8.7 MG/DL (ref 8.6–10.2)
CHLORIDE SERPL-SCNC: 102 MMOL/L (ref 98–107)
CO2 SERPL-SCNC: 25 MMOL/L (ref 22–29)
CREAT SERPL-MCNC: 0.7 MG/DL (ref 0.5–1)
ERYTHROCYTE [DISTWIDTH] IN BLOOD BY AUTOMATED COUNT: 12.5 % (ref 11.5–15)
GFR, ESTIMATED: 86 ML/MIN/1.73M2
GLUCOSE SERPL-MCNC: 96 MG/DL (ref 74–99)
HCT VFR BLD AUTO: 38.3 % (ref 34–48)
HGB BLD-MCNC: 12.4 G/DL (ref 11.5–15.5)
MCH RBC QN AUTO: 31.1 PG (ref 26–35)
MCHC RBC AUTO-ENTMCNC: 32.4 G/DL (ref 32–34.5)
MCV RBC AUTO: 96 FL (ref 80–99.9)
PLATELET # BLD AUTO: 209 K/UL (ref 130–450)
PMV BLD AUTO: 10.4 FL (ref 7–12)
POTASSIUM SERPL-SCNC: 4.5 MMOL/L (ref 3.5–5)
RBC # BLD AUTO: 3.99 M/UL (ref 3.5–5.5)
SODIUM SERPL-SCNC: 137 MMOL/L (ref 132–146)
WBC OTHER # BLD: 10.1 K/UL (ref 4.5–11.5)

## 2024-06-27 PROCEDURE — 99239 HOSP IP/OBS DSCHRG MGMT >30: CPT | Performed by: STUDENT IN AN ORGANIZED HEALTH CARE EDUCATION/TRAINING PROGRAM

## 2024-06-27 PROCEDURE — 36415 COLL VENOUS BLD VENIPUNCTURE: CPT

## 2024-06-27 PROCEDURE — C9113 INJ PANTOPRAZOLE SODIUM, VIA: HCPCS | Performed by: INTERNAL MEDICINE

## 2024-06-27 PROCEDURE — 94640 AIRWAY INHALATION TREATMENT: CPT

## 2024-06-27 PROCEDURE — 6360000002 HC RX W HCPCS: Performed by: INTERNAL MEDICINE

## 2024-06-27 PROCEDURE — 6370000000 HC RX 637 (ALT 250 FOR IP): Performed by: INTERNAL MEDICINE

## 2024-06-27 PROCEDURE — 85027 COMPLETE CBC AUTOMATED: CPT

## 2024-06-27 PROCEDURE — 99232 SBSQ HOSP IP/OBS MODERATE 35: CPT | Performed by: INTERNAL MEDICINE

## 2024-06-27 PROCEDURE — 80048 BASIC METABOLIC PNL TOTAL CA: CPT

## 2024-06-27 PROCEDURE — 6370000000 HC RX 637 (ALT 250 FOR IP): Performed by: NURSE PRACTITIONER

## 2024-06-27 RX ORDER — PANTOPRAZOLE SODIUM 40 MG/1
40 TABLET, DELAYED RELEASE ORAL
Qty: 60 TABLET | Refills: 0 | Status: SHIPPED | OUTPATIENT
Start: 2024-06-27

## 2024-06-27 RX ORDER — METOPROLOL SUCCINATE 25 MG/1
25 TABLET, EXTENDED RELEASE ORAL 2 TIMES DAILY
Qty: 30 TABLET | Refills: 3 | Status: SHIPPED | OUTPATIENT
Start: 2024-06-27 | End: 2024-07-12 | Stop reason: SDUPTHER

## 2024-06-27 RX ADMIN — PANTOPRAZOLE SODIUM 40 MG: 40 INJECTION, POWDER, FOR SOLUTION INTRAVENOUS at 09:19

## 2024-06-27 RX ADMIN — IPRATROPIUM BROMIDE AND ALBUTEROL SULFATE 1 DOSE: 2.5; .5 SOLUTION RESPIRATORY (INHALATION) at 08:56

## 2024-06-27 RX ADMIN — DOCUSATE SODIUM 100 MG: 100 CAPSULE, LIQUID FILLED ORAL at 09:19

## 2024-06-27 RX ADMIN — METOPROLOL SUCCINATE 25 MG: 25 TABLET, EXTENDED RELEASE ORAL at 09:19

## 2024-06-27 NOTE — PROGRESS NOTES
PROGRESS NOTE        Patient Presents with/Seen in Consultation For      Reason for Consult: Chronic dysphagia   CHIEF COMPLAINT: Dysphagia   Subjective:     Patient seen sitting on the side of the bed.  NAD.  EGD and POC discussed with patient.  Currently tolerating breakfast with no complaints of dysphagia, nausea, vomiting.  Patient denies any abdominal pain.     Review of Systems  Aside from what was mentioned in the PMH and HPI, essentially unremarkable, all others negative.    Objective:     /75   Pulse 72   Temp 97.9 °F (36.6 °C) (Oral)   Resp 19   Ht 1.549 m (5' 1\")   Wt 96.2 kg (212 lb)   SpO2 96%   BMI 40.06 kg/m²     General appearance: alert, awake, sitting on edge of bed, and cooperative  Eyes: conjunctiva pale, sclera anicteric. PERRL.  Lungs: clear to auscultation bilaterally  Heart: regular rate and rhythm, no murmur, 2+ pulses; no edema  Abdomen: soft, non-tender; bowel sounds normal; no masses,  no organomegaly  Extremities: extremities without edema  Pulses: 2+ and symmetric  Skin: Skin color, texture, turgor normal.   Neurologic: Grossly normal    perflutren lipid microspheres (DEFINITY) injection 1.5 mL, ONCE PRN  metoprolol succinate (TOPROL XL) extended release tablet 25 mg, BID  docusate sodium (COLACE) capsule 100 mg, Daily PRN  [Held by provider] apixaban (ELIQUIS) tablet 5 mg, BID  rosuvastatin (CRESTOR) tablet 5 mg, Daily  sodium chloride flush 0.9 % injection 5-40 mL, 2 times per day  sodium chloride flush 0.9 % injection 5-40 mL, PRN  0.9 % sodium chloride infusion, PRN  potassium chloride (KLOR-CON M) extended release tablet 40 mEq, PRN   Or  potassium bicarb-citric acid (EFFER-K) effervescent tablet 40 mEq, PRN   Or  potassium chloride 10 mEq/100 mL IVPB (Peripheral Line), PRN  magnesium sulfate 2000 mg in 50 mL IVPB premix, PRN  ondansetron (ZOFRAN-ODT) disintegrating tablet 4 mg, Q8H PRN   Or  ondansetron (ZOFRAN) injection 4 mg, Q6H PRN  polyethylene glycol (GLYCOLAX)

## 2024-06-27 NOTE — CARE COORDINATION
Updated plan of care. EGD done on 6/26. Anticoags resumed. Plan discharge today. Declining needs-jessica

## 2024-06-27 NOTE — PROGRESS NOTES
INPATIENT CARDIOLOGY FOLLOW-UP    Name: Jayne Weaver    Age: 84 y.o.    Date of Admission: 6/22/2024 10:54 AM    Date of Service: 6/27/2024    Chief Complaint: Follow-up for preoperative cardiac evaluation, VHD, AF    Interim History:  Admitted for further evaluation of dysphagia --> s/p EGD on 6/26/24. Currently with no chest pain, respiratory distress, or palpitations. AF with episodes of RVR on telemetry on admission -- currently SR, rate 80's. No new overnight cardiac complaints.    Review of Systems:   Cardiac: As per HPI  General: No fever, chills  Pulmonary: As per HPI  HEENT: No visual disturbances, difficult swallowing  GI: No nausea, vomiting  : No dysuria, hematuria  Endocrine: No thyroid disease or DM  Musculoskeletal: ACOSTA x 4, no focal motor deficits  Skin: Intact, no rashes  Neuro: No headache, seizures  Psych: Currently with no depression, anxiety    Problem List:  Patient Active Problem List   Diagnosis    Primary hypertension    Mixed hyperlipidemia    Fatigue    Chronic obstructive pulmonary disease, unspecified COPD type (HCC)    Seasonal allergies    Vitamin D deficiency    Valvular heart disease    Dysphagia    Nonrheumatic aortic valve stenosis    RAMOS (dyspnea on exertion)    Persistent atrial fibrillation (HCC)    Chronic anticoagulation       Allergies:  No Known Allergies    Current Medications:  Current Facility-Administered Medications   Medication Dose Route Frequency Provider Last Rate Last Admin    perflutren lipid microspheres (DEFINITY) injection 1.5 mL  1.5 mL IntraVENous ONCE PRN Lizz Chambers APRN - CNP        metoprolol succinate (TOPROL XL) extended release tablet 25 mg  25 mg Oral BID Lizz Chambers APRN - CNP   25 mg at 06/27/24 0919    docusate sodium (COLACE) capsule 100 mg  100 mg Oral Daily PRN Merlene Ratliff APRN - CNP   100 mg at 06/27/24 0919    apixaban (ELIQUIS) tablet 5 mg  5 mg Oral BID Mai Valdes MD   5 mg at 06/23/24 2105    rosuvastatin  peak velocity is 0.8 cm2. AV Stroke Volume index is 41 mL/m2.    ASSESSMENT / PLAN:  Dysphagia / consulted for cardiac risk assessment prior to EGD for dysphagia  Esophagram (6/24/24): No evidence for stenosis. Laryngeal penetration of thick liquid barium without evidence of aspiration.  EGD (6/26/24):  Mildly tortuous esophagus  Hiatal hernia  3 small stasis ulcerations with no high risk stigmata of bleeding, bx and dilation was performed  Moderate-severe AS on TTE 9/2023 --> severe AS on TTE 6/25/24  Mild TR / PHTN  Persistent atrial fibrillation: (diagnosed in 2/2024): in and out of AF this admission  Chronic anticoagulation with eliquis  Chronic RAMOS -- multifactorial  HTN: controlled.   HLD, on statin therapy  Probable MICHAEL  COPD  Obesity: BMI 40.06 kg/m2  Hx of right lumpectomy   Hx of cholecystectomy   Vitamin D deficiency   Colonoscopy 2/20/2017 (indicated for bleeding): hemorrhoids and some mild diverticular changes but essentially normal colonoscopy with ileocecal valve    Results of 6/25/24 echocardiogram reviewed with the patient -- outpatient valve clinic evaluation  Switched from coreg 3.125 mg BID to Toprol XL 25 mg BID on 6/24/24 -- continue  EGD results noted  Okay to resume Eliquis per GI  Monitor labs  Aggressive risk factor modifications  Telemetry reviewed (AF --> SR)  Outpatient cardiology follow-up / will follow STEPHANIE Shrestha MD  [facility] Cardiology

## 2024-06-27 NOTE — PLAN OF CARE
Problem: Discharge Planning  Goal: Discharge to home or other facility with appropriate resources  Outcome: Completed     Problem: Chronic Conditions and Co-morbidities  Goal: Patient's chronic conditions and co-morbidity symptoms are monitored and maintained or improved  Outcome: Completed     Problem: Pain  Goal: Verbalizes/displays adequate comfort level or baseline comfort level  Outcome: Completed

## 2024-06-27 NOTE — FLOWSHEET NOTE
06/27/24 1307   AVS Reviewed   AVS & discharge instructions reviewed with patient and/or representative? Yes   Reviewed instructions with Patient   Level of Understanding Questions answered;Verbalized understanding     Discharge instructions given to patient. Medication administration, follow up appointments and special instructions discussed. Patient thankful for the care received.

## 2024-06-27 NOTE — PLAN OF CARE
Problem: Discharge Planning  Goal: Discharge to home or other facility with appropriate resources  6/26/2024 2226 by Zee Trevino RN  Outcome: Progressing     Problem: Chronic Conditions and Co-morbidities  Goal: Patient's chronic conditions and co-morbidity symptoms are monitored and maintained or improved  6/26/2024 2226 by Zee Trevino, RN  Outcome: Progressing     Problem: Pain  Goal: Verbalizes/displays adequate comfort level or baseline comfort level  Outcome: Progressing

## 2024-06-27 NOTE — PROGRESS NOTES
[facility] Quality Flow/Interdisciplinary Rounds Progress Note        Quality Flow Rounds held on June 27, 2024    Disciplines Attending:  Bedside Nurse, , , Nursing Unit Leadership, and      Jayne Owen was admitted on 6/22/2024 10:54 AM    Anticipated Discharge Date:   6/27/2024    Disposition: Home    Franco Score:  Franco Scale Score: 20    Readmission Risk              Risk of Unplanned Readmission:  11           Discussed patient goal for the day, patient clinical progression, and barriers to discharge.  The following Goal(s) of the Day/Commitment(s) have been identified:  Discharge - Obtain Order      Brinda Davies RN  June 27, 2024

## 2024-06-27 NOTE — DISCHARGE SUMMARY
[facility] Hospitalist Physician Discharge Summary       Ras Perez, DO  [address]  [address]  [place] [address]  [phone]    Schedule an appointment as soon as possible for a visit  Continue pantoprazole twice daily for 8 weeks then decrease to pantoprazole once daily      Activity level: As tolerated     Dispo: Home      Condition on discharge: Stable     Patient ID:  Jayne Weaver  [id number]  84 y.o.  1940    Admit date: 6/22/2024    Discharge date and time:  6/27/2024  10:43 AM    Admission Diagnoses: Principal Problem:    Dysphagia  Active Problems:    Primary hypertension    Nonrheumatic aortic valve stenosis    RAMOS (dyspnea on exertion)    Persistent atrial fibrillation (HCC)    Chronic anticoagulation  Resolved Problems:    * No resolved hospital problems. *      Discharge Diagnoses: Principal Problem:    Dysphagia  Active Problems:    Primary hypertension    Nonrheumatic aortic valve stenosis    RAMOS (dyspnea on exertion)    Persistent atrial fibrillation (HCC)    Chronic anticoagulation  Resolved Problems:    * No resolved hospital problems. *      Consults:  IP CONSULT TO GI  IP CONSULT TO SPIRITUAL SERVICES  IP CONSULT TO CARDIOLOGY    Hospital Course:   Patient Jayne Weaver is a 84 y.o. presented with Dysphagia [R13.10]  Dysphagia, unspecified type [R13.10]  Patient was admitted and managed for Dysphagia. Noted concern for reflux disease on CT neck. Continue IV Protonix twice daily. GI was following, esophagram noted, restarted on regular diet. S/p  EGD - with Dr. Perez 6/26/2024: Impression: Mildly tortuous esophagus. Hiatal hernia. 3 small stasis ulcerations with no high risk stigmata of bleeding, bx and dilation was performed. , obtained cardiology clearance. Paroxysmal A-fib. Remained rate controlled. On Coreg and Eliquis, coreg changed to Toprol - XL. Moderate to severe aortic valve stenosis. Noted ECHO - EF is 60%. Left ventricle size is normal. OP f/u with  tablet  Commonly known as: TOPROL XL  Take 1 tablet by mouth in the morning and at bedtime     pantoprazole 40 MG tablet  Commonly known as: PROTONIX  Take 1 tablet by mouth 2 times daily (before meals)            CONTINUE taking these medications      apixaban 5 MG Tabs tablet  Commonly known as: Eliquis  Take 1 tablet by mouth 2 times daily     Breztri Aerosphere 160-9-4.8 MCG/ACT Aero  Generic drug: Budeson-Glycopyrrol-Formoterol  Inhale 2 puffs into the lungs in the morning and at bedtime     CENTRUM PO     rosuvastatin 5 MG tablet  Commonly known as: CRESTOR  Take 1 tablet by mouth daily            STOP taking these medications      carvedilol 3.125 MG tablet  Commonly known as: COREG               Where to Get Your Medications        These medications were sent to [address] -  [phone] - F [phone]  [address] [place] [id number]      Phone: [phone]   metoprolol succinate 25 MG extended release tablet  pantoprazole 40 MG tablet           Note that more than 30 minutes was spent in preparing discharge papers, discussing discharge with patient, medication review, etc.    Signed:  Electronically signed by Deepti Cheung MD on 6/27/2024 at 10:43 AM

## 2024-06-28 ENCOUNTER — CARE COORDINATION (OUTPATIENT)
Dept: CARE COORDINATION | Age: 84
End: 2024-06-28

## 2024-06-28 DIAGNOSIS — R13.10 DYSPHAGIA, UNSPECIFIED TYPE: Primary | ICD-10-CM

## 2024-06-28 PROCEDURE — 1111F DSCHRG MED/CURRENT MED MERGE: CPT | Performed by: FAMILY MEDICINE

## 2024-06-28 NOTE — CARE COORDINATION
Care Transitions Note    Initial Call - Call within 2 business days of discharge: Yes    Patient Current Location:  Home: [address]  [place] [address]    Care Transition Nurse contacted the patient by telephone to perform post hospital discharge assessment, verified name and  as identifiers. Provided introduction to self, and explanation of the Care Transition Nurse role.     Patient: Jayne Weaver    Patient : 1940   MRN: [id number]    Reason for Admission: Dysphagia  Discharge Date: 24  RURS: Readmission Risk Score: 7.2      Last Discharge Facility       Date Complaint Diagnosis Description Type Department Provider    24 Dysphagia Dysphagia, unspecified type ... ED to Hosp-Admission (Discharged) (ADMITTED) Deepti Cason MD; Coreen, ...          Was this an external facility discharge? No    Additional needs identified to be addressed with provider   No needs identified             Method of communication with provider: none.    Patients top risk factors for readmission: medical condition-HTN, COPD, AFib on ACT    Interventions to address risk factors:   Education: Discussed COPD zone tool and knowing when to call doctor or seek medical attention.  Review of patient management of conditions/medications: Discussed bleeding precautions associated with blood thinner therapy (Eliquis/AFib) and knowing when to call doctor or seek immediate medical attention.    Care Summary Note: Spoke with patient today 24 for initial JAVI/hospital discharge (low risk) follow up for dysphagia.     Patient states she is feeling better since discharge and has no difficulty swallowing. Patient states she was able to have oatmeal, yogurt, toast and coffee for her breakfast today which she tolerated well. Noted patient is s/p EGD while IP which showed :     Impression: Mildly tortuous esophagus. Hiatal hernia. 3 small stasis ulcerations with no high risk stigmata of bleeding, bx and

## 2024-07-01 ENCOUNTER — OFFICE VISIT (OUTPATIENT)
Dept: PRIMARY CARE CLINIC | Age: 84
End: 2024-07-01

## 2024-07-01 VITALS
DIASTOLIC BLOOD PRESSURE: 80 MMHG | OXYGEN SATURATION: 96 % | HEIGHT: 61 IN | HEART RATE: 95 BPM | SYSTOLIC BLOOD PRESSURE: 128 MMHG | TEMPERATURE: 97.5 F | WEIGHT: 212 LBS | RESPIRATION RATE: 18 BRPM | BODY MASS INDEX: 40.02 KG/M2

## 2024-07-01 DIAGNOSIS — I38 VALVULAR HEART DISEASE: ICD-10-CM

## 2024-07-01 DIAGNOSIS — K22.2 ESOPHAGEAL STRICTURE: ICD-10-CM

## 2024-07-01 DIAGNOSIS — E55.9 VITAMIN D DEFICIENCY: ICD-10-CM

## 2024-07-01 DIAGNOSIS — K59.00 CONSTIPATION, UNSPECIFIED CONSTIPATION TYPE: ICD-10-CM

## 2024-07-01 DIAGNOSIS — Z09 HOSPITAL DISCHARGE FOLLOW-UP: Primary | ICD-10-CM

## 2024-07-01 DIAGNOSIS — R13.10 DYSPHAGIA, UNSPECIFIED TYPE: ICD-10-CM

## 2024-07-01 DIAGNOSIS — I10 ESSENTIAL HYPERTENSION: ICD-10-CM

## 2024-07-01 DIAGNOSIS — I48.19 PERSISTENT ATRIAL FIBRILLATION (HCC): ICD-10-CM

## 2024-07-01 DIAGNOSIS — E78.2 MIXED HYPERLIPIDEMIA: ICD-10-CM

## 2024-07-01 DIAGNOSIS — J44.9 CHRONIC OBSTRUCTIVE PULMONARY DISEASE, UNSPECIFIED COPD TYPE (HCC): ICD-10-CM

## 2024-07-01 PROBLEM — R06.09 DOE (DYSPNEA ON EXERTION): Status: RESOLVED | Noted: 2024-06-24 | Resolved: 2024-07-01

## 2024-07-01 NOTE — PROGRESS NOTES
disease, unspecified COPD type (HCC)    Seasonal allergies    Vitamin D deficiency    Valvular heart disease    Dysphagia    Nonrheumatic aortic valve stenosis    Persistent atrial fibrillation (HCC)    Chronic anticoagulation       Medications listed as ordered at the time of discharge from hospital     Medication List            Accurate as of July 1, 2024 11:53 AM. If you have any questions, ask your nurse or doctor.                CONTINUE taking these medications      apixaban 5 MG Tabs tablet  Commonly known as: Eliquis  Take 1 tablet by mouth 2 times daily     Breztri Aerosphere 160-9-4.8 MCG/ACT Aero  Generic drug: Budeson-Glycopyrrol-Formoterol  Inhale 2 puffs into the lungs in the morning and at bedtime     CENTRUM PO     metoprolol succinate 25 MG extended release tablet  Commonly known as: TOPROL XL  Take 1 tablet by mouth in the morning and at bedtime     pantoprazole 40 MG tablet  Commonly known as: PROTONIX  Take 1 tablet by mouth 2 times daily (before meals)     rosuvastatin 5 MG tablet  Commonly known as: CRESTOR  Take 1 tablet by mouth daily                Medications marked \"taking\" at this time  Outpatient Medications Marked as Taking for the 7/1/24 encounter (Office Visit) with Navdeep Rankin, DO   Medication Sig Dispense Refill    metoprolol succinate (TOPROL XL) 25 MG extended release tablet Take 1 tablet by mouth in the morning and at bedtime 30 tablet 3    pantoprazole (PROTONIX) 40 MG tablet Take 1 tablet by mouth 2 times daily (before meals) 60 tablet 0    Budeson-Glycopyrrol-Formoterol (BREZTRI AEROSPHERE) 160-9-4.8 MCG/ACT AERO Inhale 2 puffs into the lungs in the morning and at bedtime 10.7 g 4    rosuvastatin (CRESTOR) 5 MG tablet Take 1 tablet by mouth daily 90 tablet 1    apixaban (ELIQUIS) 5 MG TABS tablet Take 1 tablet by mouth 2 times daily 60 tablet 5    Multiple Vitamins-Minerals (CENTRUM PO) Take by mouth daily          Medications patient taking as of now reconciled against

## 2024-07-02 NOTE — PROGRESS NOTES
Physician Progress Note      PATIENT:               EDWINA COELHO  [facility] #:                  [id number]  :                       1940  ADMIT DATE:       2024 10:54 AM  DISCH DATE:        2024 1:10 PM  RESPONDING  PROVIDER #:        Deepti Bauer MD          QUERY TEXT:    Patient admitted with Dysphagia, noted to have tortuous esophagus, Hiatal   hernia, stasis ulcerations, peptic ulcer. If possible, please document in   progress notes and discharge summary the cause of the Dysphagia:  The medical record reflects the following:  Risk Factors: hernia, ulcer  Clinical Indicators: H&P  Dysphagia. EGD  Mildly tortuous esophagus,   Hiatal hernia, small stasis ulcerations, Peptic stricture noted just above   GEJ. DS  Patient was admitted and managed for Dysphagia. Noted concern   for reflux disease on CT neck.  Treatment: Biopsies, dilation    Thank you,  Dorita Broussard, [facility], S.  Options provided:  -- Dysphagia due to tortuosity to the esophagus  -- Dysphagia due to ulcerations  -- Dysphagia due to peptic stricture  -- Dysphagia due to hiatal hernia  -- Other - I will add my own diagnosis  -- Disagree - Not applicable / Not valid  -- Disagree - Clinically unable to determine / Unknown  -- Refer to Clinical Documentation Reviewer    PROVIDER RESPONSE TEXT:    This patient has Dysphagia due to ortuosity to the esophagus.    Query created by: Dorita Broussard on 2024 12:36 AM      Electronically signed by:  Deepti Bauer MD 2024 1:48 PM

## 2024-07-09 LAB — SURGICAL PATHOLOGY REPORT: NORMAL

## 2024-07-12 ENCOUNTER — CARE COORDINATION (OUTPATIENT)
Dept: CARE COORDINATION | Age: 84
End: 2024-07-12

## 2024-07-12 RX ORDER — METOPROLOL SUCCINATE 25 MG/1
25 TABLET, EXTENDED RELEASE ORAL 2 TIMES DAILY
Qty: 30 TABLET | Refills: 3 | Status: SHIPPED | OUTPATIENT
Start: 2024-07-12

## 2024-07-12 NOTE — CARE COORDINATION
Care Transitions Note    Follow Up Call     Attempted to reach patient for transitions of care follow up.  Unable to reach patient.      Outreach Attempts:   HIPAA compliant voicemail left for patient.     Care Summary Note: Attempted to contact patient today 7/12/24 for JAVI/hospital discharge (low risk) follow up sub call. Left message on home/mobile number requesting a return call back to CTN and provided contact information.     Noted patient completed HFU appt with Dr. Rankin (PCP) on 7/1/24. CTN will continue to follow for Care Transition.     Follow Up Appointment:   Future Appointments         Provider Specialty Dept Phone    7/17/2024 1:20 PM Chema Norman MD Cardiology [phone]    9/9/2024 1:15 PM Navdeep Rankin DO Primary Care [phone]            Rosi Hicks, APRN

## 2024-07-17 ENCOUNTER — OFFICE VISIT (OUTPATIENT)
Dept: CARDIOLOGY CLINIC | Age: 84
End: 2024-07-17
Payer: MEDICARE

## 2024-07-17 VITALS
RESPIRATION RATE: 18 BRPM | SYSTOLIC BLOOD PRESSURE: 118 MMHG | HEIGHT: 61 IN | DIASTOLIC BLOOD PRESSURE: 64 MMHG | WEIGHT: 212 LBS | HEART RATE: 92 BPM | BODY MASS INDEX: 40.02 KG/M2

## 2024-07-17 DIAGNOSIS — I48.19 PERSISTENT ATRIAL FIBRILLATION (HCC): ICD-10-CM

## 2024-07-17 DIAGNOSIS — Z79.01 CHRONIC ANTICOAGULATION: ICD-10-CM

## 2024-07-17 DIAGNOSIS — I10 PRIMARY HYPERTENSION: ICD-10-CM

## 2024-07-17 DIAGNOSIS — I35.0 NONRHEUMATIC AORTIC VALVE STENOSIS: Primary | ICD-10-CM

## 2024-07-17 DIAGNOSIS — R06.02 SHORTNESS OF BREATH: ICD-10-CM

## 2024-07-17 LAB — PRO-BNP: 361 PG/ML (ref 0–450)

## 2024-07-17 PROCEDURE — 93000 ELECTROCARDIOGRAM COMPLETE: CPT | Performed by: INTERNAL MEDICINE

## 2024-07-17 PROCEDURE — 3074F SYST BP LT 130 MM HG: CPT | Performed by: INTERNAL MEDICINE

## 2024-07-17 PROCEDURE — 1123F ACP DISCUSS/DSCN MKR DOCD: CPT | Performed by: INTERNAL MEDICINE

## 2024-07-17 PROCEDURE — 3078F DIAST BP <80 MM HG: CPT | Performed by: INTERNAL MEDICINE

## 2024-07-17 PROCEDURE — 99214 OFFICE O/P EST MOD 30 MIN: CPT | Performed by: INTERNAL MEDICINE

## 2024-07-17 RX ORDER — CARVEDILOL 3.12 MG/1
3.12 TABLET ORAL 2 TIMES DAILY WITH MEALS
COMMUNITY
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
## 2024-07-18 ENCOUNTER — TELEPHONE (OUTPATIENT)
Dept: CARDIOLOGY CLINIC | Age: 84
End: 2024-07-18

## 2024-07-18 NOTE — TELEPHONE ENCOUNTER
----- Message from Chema Norman MD sent at 7/18/2024  7:46 AM EDT -----  Please let patient know the blood test was okay.  Continue same medications with office follow-up in 6 months.

## 2024-07-25 ENCOUNTER — CARE COORDINATION (OUTPATIENT)
Dept: CARE COORDINATION | Age: 84
End: 2024-07-25

## 2024-07-25 NOTE — CARE COORDINATION
Care Transitions Note    Final Call     Patient Current Location:  Home: [address]  [address]  [place] [address]    Care Transition Nurse contacted the patient by telephone. Verified name and  as identifiers.    Patient graduated from the Care Transitions program on 24.  Patient/family verbalizes confidence in the ability to self-manage at this time..      Advance Care Planning:   Does patient have an Advance Directive: reviewed and current.    Handoff:   Patient was not referred to the ACM team due to no additional needs identified.       Care Summary Note: Spoke with patient today 24 for final JAVI/hospital discharge (low risk) follow up for dysphagia and s/p EGD with dilatation. .     Patient states she continues doing well and offers no complaints. Denies any dysphagia. Denies any worsening shortness of breath (COPD) from baseline. Patient does not wear home oxygen. Denies any chest pain, chest discomfort or palpitations.     Confirmed with patient she completed f/u with Dr. Norman (Cardio) on 24 and had EKG verbalizing she was told \"everything is fine\".     Confirmed with patient she completed HFU appt with PCP on 24 and scheduled to follow up with GI on 24.     Patient denies any complaints or needs and in agreement to final call. CTN signing off.     Assessments:  Care Transitions Subsequent and Final Call    Subsequent and Final Calls  Do you have any ongoing symptoms?: No  Have your medications changed?: No  Do you have any questions related to your medications?: No  Do you currently have any active services?: No  Do you have any needs or concerns that I can assist you with?: No  Identified Barriers: None  Care Transitions Interventions  No Identified Needs  Other Interventions:              Upcoming Appointments:    Future Appointments         Provider Specialty Dept Phone    2024 1:15 PM Navdeep Rankin DO Primary Care [phone]    2025 1:00 PM Chema Norman MD

## 2024-07-29 RX ORDER — PANTOPRAZOLE SODIUM 40 MG/1
40 TABLET, DELAYED RELEASE ORAL
Qty: 60 TABLET | Refills: 0 | Status: SHIPPED | OUTPATIENT
Start: 2024-07-29

## 2024-09-03 DIAGNOSIS — E55.9 VITAMIN D DEFICIENCY: ICD-10-CM

## 2024-09-03 DIAGNOSIS — I10 ESSENTIAL HYPERTENSION: ICD-10-CM

## 2024-09-03 DIAGNOSIS — E78.2 MIXED HYPERLIPIDEMIA: ICD-10-CM

## 2024-09-03 LAB
ALBUMIN: 3.9 G/DL (ref 3.5–5.2)
ALP BLD-CCNC: 67 U/L (ref 35–104)
ALT SERPL-CCNC: 10 U/L (ref 0–32)
ANION GAP SERPL CALCULATED.3IONS-SCNC: 18 MMOL/L (ref 7–16)
AST SERPL-CCNC: 17 U/L (ref 0–31)
BILIRUB SERPL-MCNC: 0.6 MG/DL (ref 0–1.2)
BUN BLDV-MCNC: 18 MG/DL (ref 6–23)
CALCIUM SERPL-MCNC: 9.7 MG/DL (ref 8.6–10.2)
CHLORIDE BLD-SCNC: 101 MMOL/L (ref 98–107)
CHOLESTEROL, TOTAL: 151 MG/DL
CO2: 24 MMOL/L (ref 22–29)
CREAT SERPL-MCNC: 0.8 MG/DL (ref 0.5–1)
GFR, ESTIMATED: 71 ML/MIN/1.73M2
GLUCOSE BLD-MCNC: 86 MG/DL (ref 74–99)
HCT VFR BLD CALC: 46.9 % (ref 34–48)
HDLC SERPL-MCNC: 33 MG/DL
HEMOGLOBIN: 14.6 G/DL (ref 11.5–15.5)
LDL CHOLESTEROL: 91 MG/DL
MCH RBC QN AUTO: 32.4 PG (ref 26–35)
MCHC RBC AUTO-ENTMCNC: 31.1 G/DL (ref 32–34.5)
MCV RBC AUTO: 104.2 FL (ref 80–99.9)
PDW BLD-RTO: 13.3 % (ref 11.5–15)
PLATELET # BLD: 263 K/UL (ref 130–450)
PMV BLD AUTO: 11.3 FL (ref 7–12)
POTASSIUM SERPL-SCNC: 4.7 MMOL/L (ref 3.5–5)
RBC # BLD: 4.5 M/UL (ref 3.5–5.5)
SODIUM BLD-SCNC: 143 MMOL/L (ref 132–146)
TOTAL PROTEIN: 7.2 G/DL (ref 6.4–8.3)
TRIGL SERPL-MCNC: 136 MG/DL
TSH SERPL DL<=0.05 MIU/L-ACNC: 2.58 UIU/ML (ref 0.27–4.2)
VITAMIN D 25-HYDROXY: 35.4 NG/ML (ref 30–100)
VLDLC SERPL CALC-MCNC: 27 MG/DL
WBC # BLD: 10.3 K/UL (ref 4.5–11.5)

## 2024-09-03 RX ORDER — PANTOPRAZOLE SODIUM 40 MG/1
TABLET, DELAYED RELEASE ORAL
Qty: 60 TABLET | Refills: 0 | Status: SHIPPED | OUTPATIENT
Start: 2024-09-03

## 2024-09-16 ENCOUNTER — OFFICE VISIT (OUTPATIENT)
Dept: PRIMARY CARE CLINIC | Age: 84
End: 2024-09-16
Payer: MEDICARE

## 2024-09-16 VITALS
SYSTOLIC BLOOD PRESSURE: 124 MMHG | HEART RATE: 80 BPM | HEIGHT: 61 IN | BODY MASS INDEX: 40.59 KG/M2 | OXYGEN SATURATION: 96 % | DIASTOLIC BLOOD PRESSURE: 78 MMHG | TEMPERATURE: 97.9 F | RESPIRATION RATE: 19 BRPM | WEIGHT: 215 LBS

## 2024-09-16 DIAGNOSIS — I48.19 PERSISTENT ATRIAL FIBRILLATION (HCC): ICD-10-CM

## 2024-09-16 DIAGNOSIS — E78.2 MIXED HYPERLIPIDEMIA: ICD-10-CM

## 2024-09-16 DIAGNOSIS — I10 PRIMARY HYPERTENSION: Primary | ICD-10-CM

## 2024-09-16 DIAGNOSIS — J30.2 SEASONAL ALLERGIES: ICD-10-CM

## 2024-09-16 DIAGNOSIS — I38 VALVULAR HEART DISEASE: ICD-10-CM

## 2024-09-16 DIAGNOSIS — J44.9 CHRONIC OBSTRUCTIVE PULMONARY DISEASE, UNSPECIFIED COPD TYPE (HCC): ICD-10-CM

## 2024-09-16 DIAGNOSIS — E55.9 VITAMIN D DEFICIENCY: ICD-10-CM

## 2024-09-16 DIAGNOSIS — R53.83 FATIGUE, UNSPECIFIED TYPE: ICD-10-CM

## 2024-09-16 PROCEDURE — 3078F DIAST BP <80 MM HG: CPT | Performed by: FAMILY MEDICINE

## 2024-09-16 PROCEDURE — 1123F ACP DISCUSS/DSCN MKR DOCD: CPT | Performed by: FAMILY MEDICINE

## 2024-09-16 PROCEDURE — 99214 OFFICE O/P EST MOD 30 MIN: CPT | Performed by: FAMILY MEDICINE

## 2024-09-16 PROCEDURE — 3074F SYST BP LT 130 MM HG: CPT | Performed by: FAMILY MEDICINE

## 2024-09-16 ASSESSMENT — ENCOUNTER SYMPTOMS
BLOOD IN STOOL: 0
DIFFICULTY BREATHING: 0
SINUS PRESSURE: 0
CHEST TIGHTNESS: 0
RHINORRHEA: 0
EYE PAIN: 0
SORE THROAT: 0
COLOR CHANGE: 0
DIARRHEA: 0
NAUSEA: 0
SHORTNESS OF BREATH: 0
APNEA: 0
BACK PAIN: 0
EYE REDNESS: 0
EYE ITCHING: 0
WHEEZING: 0
VOMITING: 0
ABDOMINAL PAIN: 0
COUGH: 0
CONSTIPATION: 0
SPUTUM PRODUCTION: 0

## 2024-09-16 ASSESSMENT — COPD QUESTIONNAIRES: COPD: 1

## 2024-09-30 ENCOUNTER — TELEPHONE (OUTPATIENT)
Dept: CARDIOLOGY CLINIC | Age: 84
End: 2024-09-30

## 2024-09-30 ENCOUNTER — OFFICE VISIT (OUTPATIENT)
Dept: PRIMARY CARE CLINIC | Age: 84
End: 2024-09-30

## 2024-09-30 VITALS
TEMPERATURE: 98.7 F | OXYGEN SATURATION: 93 % | DIASTOLIC BLOOD PRESSURE: 80 MMHG | HEIGHT: 61 IN | SYSTOLIC BLOOD PRESSURE: 124 MMHG | HEART RATE: 81 BPM | BODY MASS INDEX: 39.46 KG/M2 | RESPIRATION RATE: 18 BRPM | WEIGHT: 209 LBS

## 2024-09-30 DIAGNOSIS — I38 VALVULAR HEART DISEASE: ICD-10-CM

## 2024-09-30 DIAGNOSIS — I10 PRIMARY HYPERTENSION: ICD-10-CM

## 2024-09-30 DIAGNOSIS — I35.0 NONRHEUMATIC AORTIC VALVE STENOSIS: Primary | ICD-10-CM

## 2024-09-30 DIAGNOSIS — S16.1XXA STRAIN OF NECK MUSCLE, INITIAL ENCOUNTER: Primary | ICD-10-CM

## 2024-09-30 DIAGNOSIS — I35.0 NONRHEUMATIC AORTIC VALVE STENOSIS: ICD-10-CM

## 2024-09-30 DIAGNOSIS — J44.9 CHRONIC OBSTRUCTIVE PULMONARY DISEASE, UNSPECIFIED COPD TYPE (HCC): ICD-10-CM

## 2024-09-30 RX ORDER — PREDNISONE 20 MG/1
20 TABLET ORAL DAILY
Qty: 5 TABLET | Refills: 0 | Status: SHIPPED | OUTPATIENT
Start: 2024-09-30 | End: 2024-10-05

## 2024-09-30 ASSESSMENT — ENCOUNTER SYMPTOMS
CONSTIPATION: 0
APNEA: 0
SPUTUM PRODUCTION: 0
BLOOD IN STOOL: 0
SHORTNESS OF BREATH: 1
NAUSEA: 0
EYE PAIN: 0
DIFFICULTY BREATHING: 0
EYE ITCHING: 0
DIARRHEA: 0
SORE THROAT: 0
ABDOMINAL PAIN: 0
SINUS PRESSURE: 0
VOMITING: 0
CHEST TIGHTNESS: 0
COUGH: 0
RHINORRHEA: 0
COLOR CHANGE: 0
EYE REDNESS: 0
BACK PAIN: 0
WHEEZING: 0

## 2024-09-30 ASSESSMENT — COPD QUESTIONNAIRES: COPD: 1

## 2024-09-30 NOTE — TELEPHONE ENCOUNTER
Patient called and stated she was at PCP today and they suggested she see you,.  She has been getting very sob with minimal activity. She stated she was not in a fib there today.  He is concerned it is her heart valve

## 2024-09-30 NOTE — PROGRESS NOTES
Chief Complaint:     Chief Complaint   Patient presents with    Shortness of Breath     Started 1 week ago. Tylenol little relief.     Neck Pain    COPD    Hypertension    Heart Problem         Shortness of Breath  Associated symptoms include neck pain. Pertinent negatives include no abdominal pain, chest pain, ear pain, fever, headaches, leg swelling, rash, rhinorrhea, sore throat, sputum production, vomiting or wheezing. Nothing aggravates the symptoms. She has tried nothing for the symptoms. The treatment provided no relief. Her past medical history is significant for COPD. There is no history of a heart failure.   Neck Pain   This is a new problem. The current episode started 1 to 4 weeks ago. The problem occurs daily. The pain is associated with nothing. The pain is present in the left side. The quality of the pain is described as aching and shooting. The pain is moderate. The symptoms are aggravated by bending, position and twisting. The pain is Same all the time. Stiffness is present All day. Pertinent negatives include no chest pain, fever, headaches, numbness or weakness.   COPD  She complains of shortness of breath. There is no cough, difficulty breathing, sputum production or wheezing. This is a new problem. The current episode started more than 1 month ago. The problem occurs daily. The problem has been gradually improving. Associated symptoms include nasal congestion and postnasal drip. Pertinent negatives include no appetite change, chest pain, ear pain, fever, headaches, myalgias, rhinorrhea or sore throat. Her symptoms are aggravated by nothing. Her symptoms are alleviated by beta-agonist, steroid inhaler and ipratropium. She reports significant improvement on treatment. Risk factors for lung disease include occupational exposure and smoking/tobacco exposure. Her past medical history is significant for COPD.   Hypertension  This is a chronic problem. The current episode started more than 1 year ago.

## 2024-10-09 NOTE — PROGRESS NOTES
OFFICE VISIT    NAME: Jayne Weaver     YOB: 1940   AGE: 84 y.o.   MEDICAL RECORD NUMBER: [id number]       CONSULTATION INFORMATION:   DATE OF CLINIC CONSULTATION: 10/10/2024   PRINCIPLE DIAGNOSIS / reason for visit: AS    CARE TEAM MEMBERS    PCP : Navdeep Rankin DO     PRIMARY CARDIOLOGIST: Dr. Norman   REFERRING PROVIDER: Chema Norman MD     HISTORY OF PRESENT ILLNESS:   Jayne Weaver is a 84 y.o. female referred by Dr. Norman for AS. Past medical history of HTN, HLD, AFib, COPD, AS    She was last seen by Dr. Norman in July 2024 where she reported RAMOS. Echo was obtained to follow her AS which is now severe.          Presents to clinic today with her son.  She reports that she has been having symptoms of significant dyspnea with any exertion that she does over the last month.  Denies having chest pain or lower extremity edema.  No shortness of breath at rest.      PAST HISTORY:   Past Medical History:   Diagnosis Date    COPD (chronic obstructive pulmonary disease) (HCC)     Hyperlipidemia     Hypertension     Paroxysmal A-fib (HCC)        Past Surgical History:   Procedure Laterality Date    BREAST SURGERY Right     lumpectomy    CHOLECYSTECTOMY      COLONOSCOPY  02/20/2017    TUBAL LIGATION      UPPER GASTROINTESTINAL ENDOSCOPY N/A 6/26/2024    ESOPHAGOGASTRODUODENOSCOPY DILATATION performed by Ras Perez DO at [facility] ENDOSCOPY      Family History   Problem Relation Age of Onset    Heart Attack Father 76        massive      Social History     Socioeconomic History    Marital status:      Spouse name: Not on file    Number of children: Not on file    Years of education: Not on file    Highest education level: Not on file   Occupational History    Occupation: retired- [employer]   Tobacco Use    Smoking status: Never    Smokeless tobacco: Never   Vaping Use    Vaping status: Never Used   Substance and Sexual Activity    Alcohol use: No    Drug

## 2024-10-09 NOTE — PROGRESS NOTES
Inactive (3/7/2024)    Exercise Vital Sign     Days of Exercise per Week: 0 days     Minutes of Exercise per Session: 0 min   Stress: Not on file   Social Connections: Not on file   Intimate Partner Violence: Not on file   Housing Stability: Low Risk  (6/22/2024)    Housing Stability Vital Sign     Unable to Pay for Housing in the Last Year: No     Number of Places Lived in the Last Year: 1     Unstable Housing in the Last Year: No       Family History:  Family History   Problem Relation Age of Onset    Heart Attack Father 76        massive       Objective:  There were no vitals filed for this visit.  General Appearance: Pleasant 84 y.o. year old female who appears stated age.  Communicates well, no acute distress.    HEENT: Head is normocephalic, atraumatic.  EOMs intact, PERRL.  Trachea midline.   Lungs: Normal respiratory rate and normal effort. She is not in respiratory distress. Breath sounds clear to auscultation. No wheezes.   Heart: Normal rate. Regular rhythm. S1 normal and S2 normal. Positive for murmur.   Chest: Symmetric chest wall expansion.   Extremities: Normal range of motion.     Neurological: Patient is alert and oriented to person, place and time.   Skin: Warm and dry.   Abdomen: Abdomen is soft and non-distended. Bowel sounds are normal.   Pulses: Distal pulses are intact.  Skin: Warm and dry without lesions.        Assessment:   85 yo female with severe symptomatic AS        Plan:   Given her advanced age and frailty she is high risk for surgery and would be best served with TAVR

## 2024-10-10 ENCOUNTER — CLINICAL DOCUMENTATION (OUTPATIENT)
Dept: CARDIOLOGY | Age: 84
End: 2024-10-10

## 2024-10-10 ENCOUNTER — OFFICE VISIT (OUTPATIENT)
Dept: CARDIOTHORACIC SURGERY | Age: 84
End: 2024-10-10
Payer: MEDICARE

## 2024-10-10 ENCOUNTER — OFFICE VISIT (OUTPATIENT)
Dept: CARDIOLOGY CLINIC | Age: 84
End: 2024-10-10
Payer: MEDICARE

## 2024-10-10 VITALS
HEART RATE: 82 BPM | HEIGHT: 61 IN | SYSTOLIC BLOOD PRESSURE: 161 MMHG | DIASTOLIC BLOOD PRESSURE: 87 MMHG | RESPIRATION RATE: 24 BRPM | WEIGHT: 208 LBS | BODY MASS INDEX: 39.27 KG/M2

## 2024-10-10 DIAGNOSIS — I35.0 NONRHEUMATIC AORTIC VALVE STENOSIS: Primary | ICD-10-CM

## 2024-10-10 PROCEDURE — 99205 OFFICE O/P NEW HI 60 MIN: CPT | Performed by: INTERNAL MEDICINE

## 2024-10-10 PROCEDURE — 3079F DIAST BP 80-89 MM HG: CPT | Performed by: INTERNAL MEDICINE

## 2024-10-10 PROCEDURE — 3077F SYST BP >= 140 MM HG: CPT | Performed by: INTERNAL MEDICINE

## 2024-10-10 PROCEDURE — 1123F ACP DISCUSS/DSCN MKR DOCD: CPT | Performed by: THORACIC SURGERY (CARDIOTHORACIC VASCULAR SURGERY)

## 2024-10-10 PROCEDURE — 1123F ACP DISCUSS/DSCN MKR DOCD: CPT | Performed by: INTERNAL MEDICINE

## 2024-10-10 PROCEDURE — 99204 OFFICE O/P NEW MOD 45 MIN: CPT | Performed by: THORACIC SURGERY (CARDIOTHORACIC VASCULAR SURGERY)

## 2024-10-10 ASSESSMENT — KANSAS CITY CARDIOMYOPATHY QUESTIONNAIRE (KCCQ12)
3. OVER THE PAST 2 WEEKS, ON AVERAGE, HOW MANY TIMES HAS FATIGUE LIMITED YOUR ABILITY TO DO WHAT YOU WANTED: ALL OF THE TIME
1A. OVER THE PAST 2 WEEKS, HOW MUCH WERE YOU LIMITED BY HEART FAILURE SYMPTOMS (SHORTNESS OF BREATH OR FATIGUE) WHEN SHOWERING OR BATHING: NOT AT ALL LIMITED
1B. OVER THE PAST 2 WEEKS, HOW MUCH WERE YOU LIMITED BY HEART FAILURE SYMPTOMS (SHORTNESS OF BREATH OR FATIGUE) WHEN WALKING 1 BLOCK ON LEVEL GROUND: EXTREMELY LIMITED
8C. OVER THE PAST 2 WEEKS, ON AVERAGE, HOW HAS HEART FAILURE LIMITED YOU ABILITY TO VISIT FAMILY AND FRIENDS OUR OF YOUR HOME: MODERATELY LIMITED
2. OVER THE PAST 2 WEEKS, HOW MANY TIMES DID YOU HAVE SWELLING IN YOUR FEET, ANKLES OR LEGS WHEN YOU WOKE UP IN THE MORNING: NEVER OVER THE PAST 2 WEEKS
4. OVER THE PAST 2 WEEKS, ON AVERAGE, HOW MANY TIMES HAS SHORTNESS OF BREATH LIMITED YOUR ABILITY TO DO WHAT YOU WANTED: ALL OF THE TIME
7. IF YOU HAD TO SPEND THE REST OF YOUR LIFE WITH YOUR HEART FAILURE THE WAY IT IS RIGHT NOW, HOW WOULD YOU FEEL ABOUT THIS?: NOT AT ALL SATISFIED
1C. OVER THE PAST 2 WEEKS, HOW MUCH WERE YOU LIMITED BY HEART FAILURE SYMPTOMS (SHORTNESS OF BREATH OR FATIGUE) WHEN HURRYING OR JOGGING (AS IF TO CATCH A BUS): EXTREMELY LIMITED
5. OVER THE PAST 2 WEEKS, ON AVERAGE, HOW MANY TIMES HAVE YOU BEEN FORCED TO SLEEP SITTING UP IN A CHAIR OR WITH AT LEAST 3 PILLOWS TO PROP YOU UP BECAUSE OF SHORTNESS OF BREATH?: NEVER OVER THE PAST 2 WEEKS
8B. OVER THE PAST 2 WEEKS, ON AVERAGE, HOW HAS HEART FAILURE LIMITED YOU ABILITY TO WORK OR DO HOUSEHOLD CHORES: LIMITED QUITE A BIT
6. OVER THE PAST 2 WEEKS, HOW MUCH HAS YOUR HEART FAILURE LIMITED YOUR ENJOYMENT OF LIFE: IT HAS LIMITED MY ENJOYMENT OF LIFE QUITE A BIT
8A. OVER THE PAST 2 WEEKS, ON AVERAGE, HOW HAS HEART FAILURE LIMITED YOU ABILITY TO DO HOBBIES OR RECREATIONAL ACTIVITIES: LIMITED QUITE A BIT

## 2024-10-10 ASSESSMENT — ENCOUNTER SYMPTOMS
ABDOMINAL PAIN: 0
NAUSEA: 0
SHORTNESS OF BREATH: 1
VOMITING: 0
BLOOD IN STOOL: 0
CHEST TIGHTNESS: 0
COUGH: 0
BACK PAIN: 0

## 2024-10-10 NOTE — PROGRESS NOTES
Preop TAVR kccq-12 and 5 meter walk:        10/10/2024     1:00 PM   AMB KCCQ-12   Walking 1 block on level ground 1   Showering/Bathing 5   Hurrying or jogging (as if to catch a bus) 1   Feet/ankle/leg swelling 5   Limited by fatigue 1   Limited by SOB 1   Sleeping in a chair/propped on 3+ pillows 5   Enjoyment of life 2   Rest of your life 1   Hobbies/recreational activities 2   Working/household chores 2   Visiting family & friends out of your home 3     Five Meter Walk Test  Repeat 3 times, allowing sufficient time for recuperation between trails.  Walk 1: 8 Seconds  Walk 2: 8 Seconds  Walk 3: 8 Seconds  Total: 24 Seconds  Average: 8

## 2024-10-14 DIAGNOSIS — I35.0 NONRHEUMATIC AORTIC VALVE STENOSIS: Primary | ICD-10-CM

## 2024-10-14 DIAGNOSIS — Z01.810 PREOP CARDIOVASCULAR EXAM: ICD-10-CM

## 2024-10-15 ENCOUNTER — TELEPHONE (OUTPATIENT)
Dept: CARDIOLOGY CLINIC | Age: 84
End: 2024-10-15

## 2024-10-15 NOTE — TELEPHONE ENCOUNTER
HEART CATH INSTRUCTIONS    I called Jayne & went over her heart cath instructions  Place: [facility]  Date & Time: 10/21/24  11:00 am  Arrival Time: 9:00 am  Preop Labs: Get your preop lab work a few days before the cath at a [facility]   NPO post midnight the night before cath  Take the following meds am of procedure: Carvedilol & inhaler  Last dose of Eliquis: 10/18/21  Take an  mg 1 tab or ASA 81 mg 4 tabs prior to coming to the hosp  Bring insurance card &photo ID  Must have a ride home after the procedure  Pt comprehended instructions as evidenced by \"read back\"  Instructions will be scanned into media

## 2024-10-17 DIAGNOSIS — I35.0 NONRHEUMATIC AORTIC VALVE STENOSIS: ICD-10-CM

## 2024-10-17 DIAGNOSIS — Z01.810 PREOP CARDIOVASCULAR EXAM: ICD-10-CM

## 2024-10-17 LAB
ANION GAP SERPL CALCULATED.3IONS-SCNC: 14 MMOL/L (ref 7–16)
BASOPHILS ABSOLUTE: 0.04 K/UL (ref 0–0.2)
BASOPHILS RELATIVE PERCENT: 0 % (ref 0–2)
BUN BLDV-MCNC: 14 MG/DL (ref 6–23)
CALCIUM SERPL-MCNC: 9.6 MG/DL (ref 8.6–10.2)
CHLORIDE BLD-SCNC: 102 MMOL/L (ref 98–107)
CO2: 27 MMOL/L (ref 22–29)
CREAT SERPL-MCNC: 0.7 MG/DL (ref 0.5–1)
EOSINOPHILS ABSOLUTE: 0.12 K/UL (ref 0.05–0.5)
EOSINOPHILS RELATIVE PERCENT: 1 % (ref 0–6)
GFR, ESTIMATED: 83 ML/MIN/1.73M2
GLUCOSE BLD-MCNC: 106 MG/DL (ref 74–99)
HCT VFR BLD CALC: 45.7 % (ref 34–48)
HEMOGLOBIN: 14.4 G/DL (ref 11.5–15.5)
IMMATURE GRANULOCYTES %: 0 % (ref 0–5)
IMMATURE GRANULOCYTES ABSOLUTE: 0.05 K/UL (ref 0–0.58)
INR BLD: 1.2
LYMPHOCYTES ABSOLUTE: 2.47 K/UL (ref 1.5–4)
LYMPHOCYTES RELATIVE PERCENT: 22 % (ref 20–42)
MCH RBC QN AUTO: 31.5 PG (ref 26–35)
MCHC RBC AUTO-ENTMCNC: 31.5 G/DL (ref 32–34.5)
MCV RBC AUTO: 100 FL (ref 80–99.9)
MONOCYTES ABSOLUTE: 0.99 K/UL (ref 0.1–0.95)
MONOCYTES RELATIVE PERCENT: 9 % (ref 2–12)
NEUTROPHILS ABSOLUTE: 7.59 K/UL (ref 1.8–7.3)
NEUTROPHILS RELATIVE PERCENT: 67 % (ref 43–80)
PDW BLD-RTO: 13.1 % (ref 11.5–15)
PLATELET # BLD: 274 K/UL (ref 130–450)
PMV BLD AUTO: 11.3 FL (ref 7–12)
POTASSIUM SERPL-SCNC: 5.4 MMOL/L (ref 3.5–5)
PROTHROMBIN TIME: 13.5 SEC (ref 9.3–12.4)
RBC # BLD: 4.57 M/UL (ref 3.5–5.5)
SODIUM BLD-SCNC: 143 MMOL/L (ref 132–146)
WBC # BLD: 11.3 K/UL (ref 4.5–11.5)

## 2024-10-21 ENCOUNTER — HOSPITAL ENCOUNTER (OUTPATIENT)
Age: 84
Setting detail: OUTPATIENT SURGERY
Discharge: HOME | End: 2024-10-21
Attending: INTERNAL MEDICINE | Admitting: INTERNAL MEDICINE
Payer: MEDICARE

## 2024-10-21 VITALS
SYSTOLIC BLOOD PRESSURE: 137 MMHG | TEMPERATURE: 97.5 F | BODY MASS INDEX: 38.89 KG/M2 | OXYGEN SATURATION: 94 % | HEART RATE: 68 BPM | HEIGHT: 61 IN | RESPIRATION RATE: 20 BRPM | DIASTOLIC BLOOD PRESSURE: 63 MMHG | WEIGHT: 206 LBS

## 2024-10-21 DIAGNOSIS — I35.0 AORTIC VALVE STENOSIS, ETIOLOGY OF CARDIAC VALVE DISEASE UNSPECIFIED: ICD-10-CM

## 2024-10-21 LAB
ABO + RH BLD: NORMAL
ARM BAND NUMBER: NORMAL
BLOOD BANK SAMPLE EXPIRATION: NORMAL
BLOOD GROUP ANTIBODIES SERPL: NEGATIVE
ECHO BSA: 2.01 M2

## 2024-10-21 PROCEDURE — 7100000010 HC PHASE II RECOVERY - FIRST 15 MIN: Performed by: INTERNAL MEDICINE

## 2024-10-21 PROCEDURE — C1769 GUIDE WIRE: HCPCS | Performed by: INTERNAL MEDICINE

## 2024-10-21 PROCEDURE — 2500000003 HC RX 250 WO HCPCS: Performed by: INTERNAL MEDICINE

## 2024-10-21 PROCEDURE — 6360000004 HC RX CONTRAST MEDICATION: Performed by: INTERNAL MEDICINE

## 2024-10-21 PROCEDURE — 7100000011 HC PHASE II RECOVERY - ADDTL 15 MIN: Performed by: INTERNAL MEDICINE

## 2024-10-21 PROCEDURE — C1751 CATH, INF, PER/CENT/MIDLINE: HCPCS | Performed by: INTERNAL MEDICINE

## 2024-10-21 PROCEDURE — 86900 BLOOD TYPING SEROLOGIC ABO: CPT

## 2024-10-21 PROCEDURE — 6360000002 HC RX W HCPCS: Performed by: INTERNAL MEDICINE

## 2024-10-21 PROCEDURE — 93460 R&L HRT ART/VENTRICLE ANGIO: CPT | Performed by: INTERNAL MEDICINE

## 2024-10-21 PROCEDURE — 86901 BLOOD TYPING SEROLOGIC RH(D): CPT

## 2024-10-21 PROCEDURE — C1894 INTRO/SHEATH, NON-LASER: HCPCS | Performed by: INTERNAL MEDICINE

## 2024-10-21 PROCEDURE — 86850 RBC ANTIBODY SCREEN: CPT

## 2024-10-21 PROCEDURE — 2709999900 HC NON-CHARGEABLE SUPPLY: Performed by: INTERNAL MEDICINE

## 2024-10-21 RX ORDER — VERAPAMIL HYDROCHLORIDE 2.5 MG/ML
INJECTION, SOLUTION INTRAVENOUS PRN
Status: DISCONTINUED | OUTPATIENT
Start: 2024-10-21 | End: 2024-10-21 | Stop reason: HOSPADM

## 2024-10-21 RX ORDER — MIDAZOLAM HYDROCHLORIDE 1 MG/ML
INJECTION, SOLUTION INTRAMUSCULAR; INTRAVENOUS PRN
Status: DISCONTINUED | OUTPATIENT
Start: 2024-10-21 | End: 2024-10-21 | Stop reason: HOSPADM

## 2024-10-21 RX ORDER — HEPARIN SODIUM 1000 [USP'U]/ML
INJECTION, SOLUTION INTRAVENOUS; SUBCUTANEOUS PRN
Status: DISCONTINUED | OUTPATIENT
Start: 2024-10-21 | End: 2024-10-21 | Stop reason: HOSPADM

## 2024-10-21 RX ORDER — SODIUM CHLORIDE 0.9 % (FLUSH) 0.9 %
5-40 SYRINGE (ML) INJECTION PRN
Status: DISCONTINUED | OUTPATIENT
Start: 2024-10-21 | End: 2024-10-21 | Stop reason: HOSPADM

## 2024-10-21 RX ORDER — IOPAMIDOL 755 MG/ML
INJECTION, SOLUTION INTRAVASCULAR PRN
Status: DISCONTINUED | OUTPATIENT
Start: 2024-10-21 | End: 2024-10-21 | Stop reason: HOSPADM

## 2024-10-21 RX ORDER — SODIUM CHLORIDE 0.9 % (FLUSH) 0.9 %
5-40 SYRINGE (ML) INJECTION EVERY 12 HOURS SCHEDULED
Status: DISCONTINUED | OUTPATIENT
Start: 2024-10-21 | End: 2024-10-21 | Stop reason: HOSPADM

## 2024-10-21 RX ORDER — SODIUM CHLORIDE 9 MG/ML
INJECTION, SOLUTION INTRAVENOUS CONTINUOUS
Status: DISCONTINUED | OUTPATIENT
Start: 2024-10-28 | End: 2024-10-21 | Stop reason: HOSPADM

## 2024-10-21 RX ORDER — ACETAMINOPHEN 325 MG/1
650 TABLET ORAL EVERY 4 HOURS PRN
Status: DISCONTINUED | OUTPATIENT
Start: 2024-10-21 | End: 2024-10-21 | Stop reason: HOSPADM

## 2024-10-21 RX ORDER — SODIUM CHLORIDE 9 MG/ML
INJECTION, SOLUTION INTRAVENOUS PRN
Status: DISCONTINUED | OUTPATIENT
Start: 2024-10-21 | End: 2024-10-21 | Stop reason: HOSPADM

## 2024-10-21 RX ORDER — NITROGLYCERIN 20 MG/100ML
INJECTION INTRAVENOUS CONTINUOUS PRN
Status: COMPLETED | OUTPATIENT
Start: 2024-10-21 | End: 2024-10-21

## 2024-10-21 RX ORDER — FENTANYL CITRATE 50 UG/ML
INJECTION, SOLUTION INTRAMUSCULAR; INTRAVENOUS PRN
Status: DISCONTINUED | OUTPATIENT
Start: 2024-10-21 | End: 2024-10-21 | Stop reason: HOSPADM

## 2024-10-21 NOTE — H&P
History & Physical     CHIEF COMPLAINT: AS      DATE OF SERVICE: 10/21/2024     HISTORY OF PRESENT ILLNESS:    84 y.o. female referred by Dr. Norman for AS. Past medical history of HTN, HLD, AFib, COPD, AS               Past Medical History:  Past Medical History:   Diagnosis Date    COPD (chronic obstructive pulmonary disease) (HCC)     Hyperlipidemia     Hypertension     Paroxysmal A-fib (HCC)        Past Surgical History:   Past Surgical History:   Procedure Laterality Date    BREAST SURGERY Right     lumpectomy    CHOLECYSTECTOMY      COLONOSCOPY  02/20/2017    TUBAL LIGATION      UPPER GASTROINTESTINAL ENDOSCOPY N/A 6/26/2024    ESOPHAGOGASTRODUODENOSCOPY DILATATION performed by Ras Perez DO at [facility] ENDOSCOPY        Home Medications:    Prior to Admission medications    Medication Sig Start Date End Date Taking? Authorizing Provider   pantoprazole (PROTONIX) 40 MG tablet TAKE ONE TABLET BY MOUTH TWICE A DAY BEFORE MEALS 9/3/24  Yes Navdeep Rankin DO   carvedilol (COREG) 3.125 MG tablet Take 1 tablet by mouth 2 times daily (with meals)   Yes Provider, MD Duc   Budeson-Glycopyrrol-Formoterol (BREZTRI AEROSPHERE) 160-9-4.8 MCG/ACT AERO Inhale 2 puffs into the lungs in the morning and at bedtime 5/23/24  Yes Brandin Lee DO   rosuvastatin (CRESTOR) 5 MG tablet Take 1 tablet by mouth daily 4/24/24  Yes Moisés Kyle DO   Multiple Vitamins-Minerals (CENTRUM PO) Take by mouth daily   Yes Provider, MD Duc   tiZANidine (ZANAFLEX) 4 MG tablet Take 1 tablet by mouth nightly as needed (pain/spasms)  Patient not taking: Reported on 10/21/2024 9/30/24   Navdeep Rankin DO   apixaban (ELIQUIS) 5 MG TABS tablet Take 1 tablet by mouth 2 times daily 9/16/24   Navdeep Rankin DO       Hospital Medications:    Current Facility-Administered Medications:     [START ON 10/28/2024] 0.9 % sodium chloride infusion, , IntraVENous, Continuous, Charles Morton MD    fentaNYL (SUBLIMAZE) injection, ,

## 2024-10-21 NOTE — PROGRESS NOTES
Patient to CVL recovery area post heart catheterization. Patient A&O x 3. VSS. Right radial site soft, without oozing or hematoma. Vasc band intact. Right brachial site soft, without oozing or hematoma. Dressing clean and dry. Family at bedside. Patient taking PO well.

## 2024-10-24 DIAGNOSIS — I10 PRIMARY HYPERTENSION: ICD-10-CM

## 2024-10-24 DIAGNOSIS — R09.89 SYMPTOMS INVOLVING CARDIOVASCULAR SYSTEM: ICD-10-CM

## 2024-10-24 DIAGNOSIS — I35.0 NONRHEUMATIC AORTIC VALVE STENOSIS: Primary | ICD-10-CM

## 2024-10-25 ENCOUNTER — TELEPHONE (OUTPATIENT)
Dept: CARDIOLOGY | Age: 84
End: 2024-10-25

## 2024-10-25 RX ORDER — ROSUVASTATIN CALCIUM 5 MG/1
5 TABLET, COATED ORAL DAILY
Qty: 90 TABLET | Refills: 1 | Status: SHIPPED | OUTPATIENT
Start: 2024-10-25

## 2024-11-06 ENCOUNTER — TELEPHONE (OUTPATIENT)
Dept: ADMINISTRATIVE | Age: 84
End: 2024-11-06

## 2024-11-06 NOTE — TELEPHONE ENCOUNTER
Pt has cath. She is waiting for call to get additional test. She is still SOB  Please call pt asap.  Test has been approved. [phone]

## 2024-11-11 DIAGNOSIS — J44.9 MODERATE COPD (CHRONIC OBSTRUCTIVE PULMONARY DISEASE) (HCC): ICD-10-CM

## 2024-11-11 RX ORDER — BUDESONIDE, GLYCOPYRROLATE, AND FORMOTEROL FUMARATE 160; 9; 4.8 UG/1; UG/1; UG/1
2 AEROSOL, METERED RESPIRATORY (INHALATION) 2 TIMES DAILY
Qty: 10.7 G | Refills: 0 | Status: SHIPPED | OUTPATIENT
Start: 2024-11-11

## 2024-11-11 NOTE — TELEPHONE ENCOUNTER
Patient asking if you can call in the [facility] for her. Prescribed by pulmonary. Didn't realize she was out over the weekend and pulmonologist has not called it in yet. Has been using her rescue inhaler a lot over the weekend.

## 2024-11-15 ENCOUNTER — TELEPHONE (OUTPATIENT)
Dept: CARDIOLOGY | Age: 84
End: 2024-11-15

## 2024-11-15 DIAGNOSIS — I10 PRIMARY HYPERTENSION: Primary | ICD-10-CM

## 2024-11-15 NOTE — TELEPHONE ENCOUNTER
Incoming call from SON Rocha,   regarding patient.   Patient apparently went to [place]  lab this a.m. to get her labs drawn but was unable to do so, due to a problem with BW order.    Lab notified, and stated BW ordered on 10/24/24  was released by PA, and therefore caused an issue in the system.  Lab stated they could see the reorder from today for the patient.     Pt notified that labwork was in the system and she could get her labs    drawn.  Pt verbalized understanding and states will go next week.

## 2024-11-18 LAB
ANION GAP SERPL CALCULATED.3IONS-SCNC: 13 MMOL/L (ref 7–16)
BUN BLDV-MCNC: 15 MG/DL (ref 6–23)
CALCIUM SERPL-MCNC: 10.1 MG/DL (ref 8.6–10.2)
CHLORIDE BLD-SCNC: 99 MMOL/L (ref 98–107)
CO2: 28 MMOL/L (ref 22–29)
CREAT SERPL-MCNC: 0.8 MG/DL (ref 0.5–1)
GFR, ESTIMATED: 74 ML/MIN/1.73M2
GLUCOSE BLD-MCNC: 107 MG/DL (ref 74–99)
POTASSIUM SERPL-SCNC: 5.9 MMOL/L (ref 3.5–5)
SODIUM BLD-SCNC: 140 MMOL/L (ref 132–146)

## 2024-11-20 ENCOUNTER — HOSPITAL ENCOUNTER (OUTPATIENT)
Dept: CT IMAGING | Age: 84
Discharge: HOME | End: 2024-11-22
Attending: INTERNAL MEDICINE
Payer: MEDICARE

## 2024-11-20 ENCOUNTER — HOSPITAL ENCOUNTER (OUTPATIENT)
Dept: ULTRASOUND IMAGING | Age: 84
Discharge: HOME | End: 2024-11-22
Attending: INTERNAL MEDICINE
Payer: MEDICARE

## 2024-11-20 DIAGNOSIS — I35.0 NONRHEUMATIC AORTIC VALVE STENOSIS: ICD-10-CM

## 2024-11-20 DIAGNOSIS — R09.89 SYMPTOMS INVOLVING CARDIOVASCULAR SYSTEM: ICD-10-CM

## 2024-11-20 PROCEDURE — 6360000004 HC RX CONTRAST MEDICATION: Performed by: RADIOLOGY

## 2024-11-20 PROCEDURE — 93880 EXTRACRANIAL BILAT STUDY: CPT

## 2024-11-20 PROCEDURE — 71275 CT ANGIOGRAPHY CHEST: CPT

## 2024-11-20 PROCEDURE — 75572 CT HRT W/3D IMAGE: CPT

## 2024-11-20 PROCEDURE — 74174 CTA ABD&PLVS W/CONTRAST: CPT

## 2024-11-20 RX ORDER — IOPAMIDOL 755 MG/ML
100 INJECTION, SOLUTION INTRAVASCULAR
Status: COMPLETED | OUTPATIENT
Start: 2024-11-20 | End: 2024-11-20

## 2024-11-20 RX ORDER — SODIUM CHLORIDE 0.9 % (FLUSH) 0.9 %
10 SYRINGE (ML) INJECTION
Status: ACTIVE | OUTPATIENT
Start: 2024-11-20 | End: 2024-11-21

## 2024-11-20 RX ADMIN — IOPAMIDOL 100 ML: 755 INJECTION, SOLUTION INTRAVENOUS at 07:37

## 2024-12-04 RX ORDER — CARVEDILOL 3.12 MG/1
3.12 TABLET ORAL 2 TIMES DAILY WITH MEALS
Qty: 60 TABLET | Refills: 2 | Status: SHIPPED | OUTPATIENT
Start: 2024-12-04

## 2024-12-10 DIAGNOSIS — J44.9 MODERATE COPD (CHRONIC OBSTRUCTIVE PULMONARY DISEASE) (HCC): ICD-10-CM

## 2024-12-10 RX ORDER — BUDESONIDE, GLYCOPYRROLATE, AND FORMOTEROL FUMARATE 160; 9; 4.8 UG/1; UG/1; UG/1
2 AEROSOL, METERED RESPIRATORY (INHALATION) 2 TIMES DAILY
Qty: 10.7 G | Refills: 5 | Status: SHIPPED | OUTPATIENT
Start: 2024-12-10

## 2024-12-10 NOTE — TELEPHONE ENCOUNTER
Name of Medication(s) Requested:  Requested Prescriptions     Pending Prescriptions Disp Refills    Budeson-Glycopyrrol-Formoterol (BREZTRI AEROSPHERE) 160-9-4.8 MCG/ACT AERO 10.7 g 5     Sig: Inhale 2 puffs into the lungs in the morning and at bedtime       Medication is on current medication list Yes    Dosage and directions were verified? Yes    Quantity verified: 30 day supply     Pharmacy Verified?  Yes    Last Appointment:  9/30/2024    Future appts:  Future Appointments   Date Time Provider Department Center   1/21/2025  1:00 PM Chema Norman MD [facility]   3/17/2025  1:00 PM Navdeep Rankin DO [facility] DEP        (If no appt send self scheduling link. .REFILLAPPT)  Scheduling request sent?     [] Yes  [x] No    Does patient need updated?  [] Yes  [x] No

## 2024-12-18 ENCOUNTER — PREP FOR PROCEDURE (OUTPATIENT)
Dept: CARDIOLOGY | Age: 84
End: 2024-12-18

## 2024-12-18 DIAGNOSIS — Z95.2 HISTORY OF TRANSCATHETER AORTIC VALVE REPLACEMENT (TAVR): Primary | ICD-10-CM

## 2024-12-18 DIAGNOSIS — I35.0 NODULAR CALCIFIC AORTIC VALVE STENOSIS: ICD-10-CM

## 2024-12-18 DIAGNOSIS — I35.0 NONRHEUMATIC AORTIC VALVE STENOSIS: ICD-10-CM

## 2024-12-18 RX ORDER — SODIUM CHLORIDE 9 MG/ML
INJECTION, SOLUTION INTRAVENOUS CONTINUOUS
Status: CANCELLED | OUTPATIENT
Start: 2024-12-18

## 2024-12-18 RX ORDER — ASPIRIN 325 MG
325 TABLET, DELAYED RELEASE (ENTERIC COATED) ORAL ONCE
Status: CANCELLED | OUTPATIENT
Start: 2024-12-18

## 2024-12-18 RX ORDER — SODIUM CHLORIDE 0.9 % (FLUSH) 0.9 %
5-40 SYRINGE (ML) INJECTION EVERY 12 HOURS SCHEDULED
Status: CANCELLED | OUTPATIENT
Start: 2024-12-18

## 2024-12-18 RX ORDER — SODIUM CHLORIDE 9 MG/ML
INJECTION, SOLUTION INTRAVENOUS PRN
Status: CANCELLED | OUTPATIENT
Start: 2024-12-18

## 2024-12-18 RX ORDER — SODIUM CHLORIDE 0.9 % (FLUSH) 0.9 %
5-40 SYRINGE (ML) INJECTION PRN
Status: CANCELLED | OUTPATIENT
Start: 2024-12-18

## 2024-12-18 RX ORDER — MUPIROCIN 20 MG/G
OINTMENT TOPICAL ONCE
Status: CANCELLED | OUTPATIENT
Start: 2024-12-18 | End: 2024-12-18

## 2024-12-27 RX ORDER — ALBUTEROL SULFATE 90 UG/1
2 INHALANT RESPIRATORY (INHALATION) EVERY 6 HOURS PRN
COMMUNITY

## 2024-12-27 RX ORDER — ACETAMINOPHEN 500 MG
1000 TABLET ORAL EVERY 6 HOURS PRN
COMMUNITY

## 2024-12-27 NOTE — PROGRESS NOTES
[facility]   PRE-ADMISSION TESTING GENERAL INSTRUCTIONS  PAT Phone Number: [phone]      GENERAL INSTRUCTIONS:    [x] Hibiclens shower the night before AND the morning of surgery.   -Do not use Hibiclens on your face or head.  [x] Do not wear makeup, lotions, powders, deodorant the morning of surgery.  [x] Nothing to eat or drink after midnight. This includes no gum, candy, mints or water.  [x] You may brush your teeth, gargle, but do NOT swallow water.   [x] No tobacco products, illegal drugs, or alcohol within 24 hours of your surgery.  [x] Jewelry or valuables should not be brought to the hospital. All body and/or tongue piercing's must be removed prior to arriving to hospital. No contact lens or hair pins.   [x] Arrange transportation with a responsible adult  to and from the hospital. Arrange for someone to be with you for the remainder of the day and for 24 hours after your procedure due to having had anesthesia.          -Who will be your  for transportation? Ras, son    [x] Bring insurance card and photo ID.  [x] Transfusion (Green) Bracelet: Please bring with you to hospital, day of surgery.     PARKING INSTRUCTIONS:     [x] ARRIVAL DATE & TIME: 1/8 5 am  [x] Times are subject to change. We will contact you the business day before surgery if that were to occur.  [x] Enter into the [facility] Entrance. Two people may accompany you. Masks are not required.  [x] Parking Lot \"I\" is where you will park. It is located on the corner of [address] and [address]. The entrance is on [address].   Only one vehicle - per patient, is permitted in parking lot.   Upon entering the parking lot, a voucher ticket will print.    EDUCATION INSTRUCTIONS:           [x] Pre-admission Testing educational folder given  [x] Incentive Spirometry,coughing & deep breathing exercises reviewed.   [x] Fluoroscopy-Xray used in surgery reviewed with patient. Educational

## 2024-12-27 NOTE — PROGRESS NOTES
Spoke with AZIZA CLINE discussed patient is scheduled for surgery on 1/8 calling regarding Eliquis pre op instructions she states last dose is am of 1/5.    no

## 2025-01-02 ENCOUNTER — ANESTHESIA EVENT (OUTPATIENT)
Dept: OPERATING ROOM | Age: 85
End: 2025-01-02
Payer: MEDICARE

## 2025-01-03 ENCOUNTER — HOSPITAL ENCOUNTER (OUTPATIENT)
Dept: PREADMISSION TESTING | Age: 85
Discharge: HOME | End: 2025-01-03
Payer: MEDICARE

## 2025-01-03 ENCOUNTER — HOSPITAL ENCOUNTER (OUTPATIENT)
Dept: PULMONOLOGY | Age: 85
Discharge: HOME | End: 2025-01-03
Attending: INTERNAL MEDICINE
Payer: MEDICARE

## 2025-01-03 VITALS
TEMPERATURE: 98 F | OXYGEN SATURATION: 95 % | RESPIRATION RATE: 20 BRPM | DIASTOLIC BLOOD PRESSURE: 76 MMHG | HEART RATE: 81 BPM | SYSTOLIC BLOOD PRESSURE: 123 MMHG

## 2025-01-03 DIAGNOSIS — I35.0 NONRHEUMATIC AORTIC VALVE STENOSIS: ICD-10-CM

## 2025-01-03 LAB
ABO + RH BLD: NORMAL
ALBUMIN SERPL-MCNC: 3.8 G/DL (ref 3.5–5.2)
ALP SERPL-CCNC: 74 U/L (ref 35–104)
ALT SERPL-CCNC: 11 U/L (ref 0–32)
ANION GAP SERPL CALCULATED.3IONS-SCNC: 7 MMOL/L (ref 7–16)
ARM BAND NUMBER: NORMAL
AST SERPL-CCNC: 13 U/L (ref 0–31)
BACTERIA URNS QL MICRO: ABNORMAL
BILIRUB SERPL-MCNC: 0.5 MG/DL (ref 0–1.2)
BILIRUB UR QL STRIP: NEGATIVE
BLOOD BANK SAMPLE EXPIRATION: NORMAL
BLOOD GROUP ANTIBODIES SERPL: NEGATIVE
BUN SERPL-MCNC: 16 MG/DL (ref 6–23)
CALCIUM SERPL-MCNC: 9.4 MG/DL (ref 8.6–10.2)
CHLORIDE SERPL-SCNC: 101 MMOL/L (ref 98–107)
CLARITY UR: CLEAR
CO2 SERPL-SCNC: 30 MMOL/L (ref 22–29)
COLOR UR: YELLOW
CREAT SERPL-MCNC: 0.7 MG/DL (ref 0.5–1)
ERYTHROCYTE [DISTWIDTH] IN BLOOD BY AUTOMATED COUNT: 12.6 % (ref 11.5–15)
GFR, ESTIMATED: 83 ML/MIN/1.73M2
GLUCOSE SERPL-MCNC: 99 MG/DL (ref 74–99)
GLUCOSE UR STRIP-MCNC: NEGATIVE MG/DL
HBA1C MFR BLD: 5.4 % (ref 4–5.6)
HCT VFR BLD AUTO: 44.5 % (ref 34–48)
HGB BLD-MCNC: 14.3 G/DL (ref 11.5–15.5)
HGB UR QL STRIP.AUTO: NEGATIVE
INR PPP: 1.2
KETONES UR STRIP-MCNC: NEGATIVE MG/DL
LEUKOCYTE ESTERASE UR QL STRIP: ABNORMAL
MCH RBC QN AUTO: 32.1 PG (ref 26–35)
MCHC RBC AUTO-ENTMCNC: 32.1 G/DL (ref 32–34.5)
MCV RBC AUTO: 99.8 FL (ref 80–99.9)
NITRITE UR QL STRIP: POSITIVE
PARTIAL THROMBOPLASTIN TIME: 37.3 SEC (ref 24.5–35.1)
PH UR STRIP: 6 [PH] (ref 5–9)
PLATELET # BLD AUTO: 246 K/UL (ref 130–450)
PMV BLD AUTO: 10.6 FL (ref 7–12)
POTASSIUM SERPL-SCNC: 4.2 MMOL/L (ref 3.5–5)
PROT SERPL-MCNC: 6.9 G/DL (ref 6.4–8.3)
PROT UR STRIP-MCNC: NEGATIVE MG/DL
PROTHROMBIN TIME: 12.8 SEC (ref 9.3–12.4)
RBC # BLD AUTO: 4.46 M/UL (ref 3.5–5.5)
RBC #/AREA URNS HPF: ABNORMAL /HPF
SODIUM SERPL-SCNC: 138 MMOL/L (ref 132–146)
SP GR UR STRIP: 1.02 (ref 1–1.03)
T4 FREE SERPL-MCNC: 1.1 NG/DL (ref 0.9–1.7)
TSH SERPL DL<=0.05 MIU/L-ACNC: 2.37 UIU/ML (ref 0.27–4.2)
UROBILINOGEN UR STRIP-ACNC: 0.2 EU/DL (ref 0–1)
WBC #/AREA URNS HPF: ABNORMAL /HPF
WBC OTHER # BLD: 11.9 K/UL (ref 4.5–11.5)

## 2025-01-03 PROCEDURE — 94729 DIFFUSING CAPACITY: CPT

## 2025-01-03 PROCEDURE — 87081 CULTURE SCREEN ONLY: CPT

## 2025-01-03 PROCEDURE — 85730 THROMBOPLASTIN TIME PARTIAL: CPT

## 2025-01-03 PROCEDURE — 84439 ASSAY OF FREE THYROXINE: CPT

## 2025-01-03 PROCEDURE — 86850 RBC ANTIBODY SCREEN: CPT

## 2025-01-03 PROCEDURE — 94375 RESPIRATORY FLOW VOLUME LOOP: CPT

## 2025-01-03 PROCEDURE — 87086 URINE CULTURE/COLONY COUNT: CPT

## 2025-01-03 PROCEDURE — 85610 PROTHROMBIN TIME: CPT

## 2025-01-03 PROCEDURE — 86901 BLOOD TYPING SEROLOGIC RH(D): CPT

## 2025-01-03 PROCEDURE — 80053 COMPREHEN METABOLIC PANEL: CPT

## 2025-01-03 PROCEDURE — 84443 ASSAY THYROID STIM HORMONE: CPT

## 2025-01-03 PROCEDURE — 36415 COLL VENOUS BLD VENIPUNCTURE: CPT

## 2025-01-03 PROCEDURE — 81001 URINALYSIS AUTO W/SCOPE: CPT

## 2025-01-03 PROCEDURE — 83036 HEMOGLOBIN GLYCOSYLATED A1C: CPT

## 2025-01-03 PROCEDURE — 87088 URINE BACTERIA CULTURE: CPT

## 2025-01-03 PROCEDURE — 86900 BLOOD TYPING SEROLOGIC ABO: CPT

## 2025-01-03 PROCEDURE — 85027 COMPLETE CBC AUTOMATED: CPT

## 2025-01-04 LAB
MICROORGANISM SPEC CULT: NORMAL
SPECIMEN DESCRIPTION: NORMAL

## 2025-01-04 NOTE — PROCEDURES
[facility]              [address] [place] [address]                           PULMONARY FUNCTION      PATIENT NAME: EDWINA COELHO                : 1940  MED REC NO: [id number]                        ROOM:   ACCOUNT NO: [id number]                       ADMIT DATE: 2025  PROVIDER: Allen Martin MD      DATE OF PROCEDURE: 2025    SURGEON:  Allen Martin MD    REFERRING PHYSICIAN:  SHAYNE LEO    CLINICAL INDICATION:  Dyspnea after exercise.    PROCEDURE:  Pulmonary function test.    PURPOSE:  Diagnostic.    MEDICATIONS:  Breztri, albuterol.    LUNG MECHANICS:  There appears to be a moderate obstructive component of lung disease according to graphic and numeric representation of patient lung volumes.  Pattern of flow volume loop supports this.    FEV1/FVC 0.51.    FEV1 0.91 L, 54% of predicted with a Z-score of -2.35.    FVC 1.80 L, 82% of predicted with a Z-score of -0.93.    MVV 53% of predicted.    Inspiratory phase of flow volume loop shows adequate upper airway laryngeal and pharyngeal function.  Expiratory phase of flow volume loop shows adequate chest wall strength with hyperdynamic collapse in the large and the distal peripheral airways.    LUNG VOLUMES:  Not completed.    LUNG DIFFUSION:  DLCO corrected for alveolar volume is normal at 136%.    CLINICAL IMPRESSION:  Clinically most compatible with moderate obstructive component of lung disease.  Lack of bronchodilator effect and lung volumes limits interpretation of data.  Since the DLCO is within normal limits, this would fall likely in the category of either chronic bronchitis or asthma, but again bronchodilator effect was not completed.    Clinical correlation advised.    Thank you very much for allowing me to participate in this patient's care.          ALLEN MARTIN MD      D:  2025 12:56:25     T:  2025 23:08:34     AMB/AQS  Job #:  [id number]     Doc#:

## 2025-01-05 LAB
MICROORGANISM SPEC CULT: ABNORMAL
SERVICE CMNT-IMP: ABNORMAL
SPECIMEN DESCRIPTION: ABNORMAL

## 2025-01-06 RX ORDER — CIPROFLOXACIN 250 MG/1
250 TABLET, FILM COATED ORAL 2 TIMES DAILY
Qty: 6 TABLET | Refills: 0 | Status: ON HOLD | OUTPATIENT
Start: 2025-01-06 | End: 2025-01-09 | Stop reason: HOSPADM

## 2025-01-06 NOTE — PROGRESS NOTES
Discussed UTI noted on preop UC with patient. She is completely asymptomatic. Advised of cipro 250 mg BID x 3 days - send to her pharmacy. She states understanding and will pick it up now.

## 2025-01-06 NOTE — PROGRESS NOTES
Positive Urine C&S routed to MARIANNA CLINE.  Left voice message for MARIANNA CLINE regarding positive result.

## 2025-01-07 NOTE — H&P
STRUCTURAL CARDIOLOGY INPATIENT HISTORY AND PHYSICAL EXAMINATION  NOTE       PATIENT DEMOGRAPHICS:       NAME: Jayne Weaver   YOB: 1940   AGE: 84 y.o.   MEDICAL RECORD NUMBER: [id number]           ADMISSION INFORMATION:  DATE OF ADMISSION: 1/7/2025     PRINCIPLE DIAGNOSIS: Severe aortic stenosis         CARE TEAM MEMBERS:   PCP : Navdeep Rankin DO     PRIMARY CARDIOLOGIST: Dr. Norman    REFERRING PROVIDER: Dr. Norman           HISTORY OF PRESENT ILLNESS:    Jayne Weaver is a 84 y.o. female referred by Dr. Norman who presents today for planned Transcatheter Aortic Valve Replacement (TAVR). Past medical history of HTN, HLD, AFib, COPD, AS        Last seen in structural clinic on 10/10/24. Per last clinic note:     She was last seen by Dr. Norman in July 2024 where she reported RAMOS. Echo was obtained to follow her AS which is now severe.           Presents to clinic today with her son.  She reports that she has been having symptoms of significant dyspnea with any exertion that she does over the last month.  Denies having chest pain or lower extremity edema.  No shortness of breath at rest.    There are no interval changes in history of present illness. There has been no changes to medications, and no interval admissions to the hospital since last clinical encounter.            PAST HISTORY:   Past Medical History:   Diagnosis Date    COPD (chronic obstructive pulmonary disease) (HCC)     Hyperlipidemia     Hypertension     Paroxysmal A-fib (HCC)        Past Surgical History:   Procedure Laterality Date    BREAST SURGERY Right     lumpectomy    CARDIAC PROCEDURE N/A 10/21/2024    Left and right heart cath / coronary angiography performed by Charles Morton MD [facility]    CHOLECYSTECTOMY      COLONOSCOPY  02/20/2017    TUBAL LIGATION      UPPER GASTROINTESTINAL ENDOSCOPY N/A 6/26/2024    ESOPHAGOGASTRODUODENOSCOPY DILATATION performed by Ras Perez DO at [facility] ENDOSCOPY       Family History   Problem Relation Age of Onset    Heart Attack Father 76        massive      Social History     Socioeconomic History    Marital status:      Spouse name: Not on file    Number of children: Not on file    Years of education: Not on file    Highest education level: Not on file   Occupational History    Occupation: retired- [employer]   Tobacco Use    Smoking status: Never    Smokeless tobacco: Never   Vaping Use    Vaping status: Never Used   Substance and Sexual Activity    Alcohol use: No    Drug use: No    Sexual activity: Not Currently   Other Topics Concern    Not on file   Social History Narrative    Not on file     Social Determinants of Health     Financial Resource Strain: Low Risk  (3/7/2024)    Overall Financial Resource Strain (CARDIA)     Difficulty of Paying Living Expenses: Not hard at all   Food Insecurity: No Food Insecurity (6/22/2024)    Hunger Vital Sign     Worried About Running Out of Food in the Last Year: Never true     Ran Out of Food in the Last Year: Never true   Transportation Needs: No Transportation Needs (6/22/2024)    PRAPARE - Transportation     Lack of Transportation (Medical): No     Lack of Transportation (Non-Medical): No   Physical Activity: Inactive (3/7/2024)    Exercise Vital Sign     Days of Exercise per Week: 0 days     Minutes of Exercise per Session: 0 min   Stress: Not on file   Social Connections: Not on file   Intimate Partner Violence: Not on file   Housing Stability: Low Risk  (6/22/2024)    Housing Stability Vital Sign     Unable to Pay for Housing in the Last Year: No     Number of Places Lived in the Last Year: 1     Unstable Housing in the Last Year: No      [unfilled]   No Known Allergies            REVIEW OF SYSTEMS:   Review of Systems             PHYSICAL EXAMINATION:     VITAL SIGNS: Ht 1.549 m (5' 1\")   BMI 38.92 kg/m²     GENERAL: NAD   HEAD: Normocephalic, atraumatic.   NECK: No JVD. No carotid

## 2025-01-08 ENCOUNTER — APPOINTMENT (OUTPATIENT)
Age: 85
DRG: 267 | End: 2025-01-08
Attending: INTERNAL MEDICINE
Payer: MEDICARE

## 2025-01-08 ENCOUNTER — ANESTHESIA (OUTPATIENT)
Dept: OPERATING ROOM | Age: 85
End: 2025-01-08
Payer: MEDICARE

## 2025-01-08 ENCOUNTER — HOSPITAL ENCOUNTER (INPATIENT)
Age: 85
LOS: 2 days | Discharge: HOME | DRG: 267 | End: 2025-01-10
Attending: INTERNAL MEDICINE | Admitting: INTERNAL MEDICINE
Payer: MEDICARE

## 2025-01-08 DIAGNOSIS — Z95.2 HISTORY OF TRANSCATHETER AORTIC VALVE REPLACEMENT (TAVR): ICD-10-CM

## 2025-01-08 DIAGNOSIS — I35.0 NONRHEUMATIC AORTIC VALVE STENOSIS: ICD-10-CM

## 2025-01-08 DIAGNOSIS — I35.0 NODULAR CALCIFIC AORTIC VALVE STENOSIS: ICD-10-CM

## 2025-01-08 DIAGNOSIS — I44.7 LEFT BUNDLE BRANCH BLOCK: Primary | ICD-10-CM

## 2025-01-08 LAB
ABO/RH: NORMAL
ANION GAP SERPL CALCULATED.3IONS-SCNC: 3 MMOL/L (ref 7–16)
ANTIBODY SCREEN: NEGATIVE
ARM BAND NUMBER: NORMAL
BLOOD BANK DISPENSE STATUS: NORMAL
BLOOD BANK SAMPLE EXPIRATION: NORMAL
BPU ID: NORMAL
BUN SERPL-MCNC: 13 MG/DL (ref 6–23)
CALCIUM SERPL-MCNC: 9.3 MG/DL (ref 8.6–10.2)
CHLORIDE SERPL-SCNC: 103 MMOL/L (ref 98–107)
CO2 SERPL-SCNC: 29 MMOL/L (ref 22–29)
COMPONENT: NORMAL
CREAT SERPL-MCNC: 0.7 MG/DL (ref 0.5–1)
CROSSMATCH RESULT: NORMAL
ECHO AV AREA PEAK VELOCITY: 1.8 CM2
ECHO AV AREA VTI: 2.1 CM2
ECHO AV AREA/BSA PEAK VELOCITY: 0.9 CM2/M2
ECHO AV AREA/BSA VTI: 1.1 CM2/M2
ECHO AV MEAN GRADIENT: 6 MMHG
ECHO AV MEAN VELOCITY: 1.2 M/S
ECHO AV PEAK GRADIENT: 13 MMHG
ECHO AV PEAK VELOCITY: 1.8 M/S
ECHO AV VELOCITY RATIO: 0.5
ECHO AV VTI: 33 CM
ECHO BSA: 2.02 M2
ECHO LV EF PHYSICIAN: 58 %
ECHO LVOT AREA: 3.5 CM2
ECHO LVOT AV VTI INDEX: 0.59
ECHO LVOT DIAM: 2.1 CM
ECHO LVOT MEAN GRADIENT: 2 MMHG
ECHO LVOT PEAK GRADIENT: 3 MMHG
ECHO LVOT PEAK VELOCITY: 0.9 M/S
ECHO LVOT STROKE VOLUME INDEX: 35 ML/M2
ECHO LVOT SV: 67.5 ML
ECHO LVOT VTI: 19.5 CM
EKG ATRIAL RATE: 69 BPM
EKG P AXIS: 30 DEGREES
EKG P-R INTERVAL: 328 MS
EKG Q-T INTERVAL: 480 MS
EKG QRS DURATION: 142 MS
EKG QTC CALCULATION (BAZETT): 514 MS
EKG R AXIS: -22 DEGREES
EKG T AXIS: 47 DEGREES
EKG VENTRICULAR RATE: 69 BPM
ERYTHROCYTE [DISTWIDTH] IN BLOOD BY AUTOMATED COUNT: 12.3 % (ref 11.5–15)
GFR, ESTIMATED: 86 ML/MIN/1.73M2
GLUCOSE BLD-MCNC: 119 MG/DL (ref 74–99)
GLUCOSE BLD-MCNC: 136 MG/DL (ref 74–99)
GLUCOSE BLD-MCNC: 137 MG/DL (ref 74–99)
GLUCOSE BLD-MCNC: 94 MG/DL (ref 74–99)
GLUCOSE SERPL-MCNC: 150 MG/DL (ref 74–99)
HCT VFR BLD AUTO: 39.5 % (ref 34–48)
HGB BLD-MCNC: 13.1 G/DL (ref 11.5–15.5)
INR PPP: 1.2
MAGNESIUM SERPL-MCNC: 2.1 MG/DL (ref 1.6–2.6)
MCH RBC QN AUTO: 32.1 PG (ref 26–35)
MCHC RBC AUTO-ENTMCNC: 33.2 G/DL (ref 32–34.5)
MCV RBC AUTO: 96.8 FL (ref 80–99.9)
PLATELET # BLD AUTO: 207 K/UL (ref 130–450)
PMV BLD AUTO: 10.4 FL (ref 7–12)
POTASSIUM SERPL-SCNC: 4.4 MMOL/L (ref 3.5–5)
POTASSIUM SERPL-SCNC: 4.8 MMOL/L (ref 3.5–5)
PROTHROMBIN TIME: 13.2 SEC (ref 9.3–12.4)
RBC # BLD AUTO: 4.08 M/UL (ref 3.5–5.5)
SODIUM SERPL-SCNC: 135 MMOL/L (ref 132–146)
TRANSFUSION STATUS: NORMAL
UNIT DIVISION: 0
WBC OTHER # BLD: 19.4 K/UL (ref 4.5–11.5)

## 2025-01-08 PROCEDURE — 6360000002 HC RX W HCPCS: Performed by: NURSE ANESTHETIST, CERTIFIED REGISTERED

## 2025-01-08 PROCEDURE — 33361 REPLACE AORTIC VALVE PERQ: CPT | Performed by: STUDENT IN AN ORGANIZED HEALTH CARE EDUCATION/TRAINING PROGRAM

## 2025-01-08 PROCEDURE — C1769 GUIDE WIRE: HCPCS | Performed by: INTERNAL MEDICINE

## 2025-01-08 PROCEDURE — 93308 TTE F-UP OR LMTD: CPT | Performed by: INTERNAL MEDICINE

## 2025-01-08 PROCEDURE — C1889 IMPLANT/INSERT DEVICE, NOC: HCPCS | Performed by: INTERNAL MEDICINE

## 2025-01-08 PROCEDURE — 93005 ELECTROCARDIOGRAM TRACING: CPT | Performed by: INTERNAL MEDICINE

## 2025-01-08 PROCEDURE — 2500000003 HC RX 250 WO HCPCS

## 2025-01-08 PROCEDURE — 80048 BASIC METABOLIC PNL TOTAL CA: CPT

## 2025-01-08 PROCEDURE — 2140000000 HC CCU INTERMEDIATE R&B

## 2025-01-08 PROCEDURE — 85347 COAGULATION TIME ACTIVATED: CPT

## 2025-01-08 PROCEDURE — 84132 ASSAY OF SERUM POTASSIUM: CPT

## 2025-01-08 PROCEDURE — 3600000005 HC SURGERY LEVEL 5 BASE: Performed by: INTERNAL MEDICINE

## 2025-01-08 PROCEDURE — B3101ZZ FLUOROSCOPY OF THORACIC AORTA USING LOW OSMOLAR CONTRAST: ICD-10-PCS | Performed by: STUDENT IN AN ORGANIZED HEALTH CARE EDUCATION/TRAINING PROGRAM

## 2025-01-08 PROCEDURE — 2720000010 HC SURG SUPPLY STERILE: Performed by: INTERNAL MEDICINE

## 2025-01-08 PROCEDURE — 6360000002 HC RX W HCPCS: Performed by: PHYSICIAN ASSISTANT

## 2025-01-08 PROCEDURE — 2500000003 HC RX 250 WO HCPCS: Performed by: INTERNAL MEDICINE

## 2025-01-08 PROCEDURE — 93308 TTE F-UP OR LMTD: CPT

## 2025-01-08 PROCEDURE — 2500000003 HC RX 250 WO HCPCS: Performed by: PHYSICIAN ASSISTANT

## 2025-01-08 PROCEDURE — 33361 REPLACE AORTIC VALVE PERQ: CPT | Performed by: INTERNAL MEDICINE

## 2025-01-08 PROCEDURE — 2709999900 HC NON-CHARGEABLE SUPPLY: Performed by: INTERNAL MEDICINE

## 2025-01-08 PROCEDURE — 7100000001 HC PACU RECOVERY - ADDTL 15 MIN

## 2025-01-08 PROCEDURE — 94640 AIRWAY INHALATION TREATMENT: CPT

## 2025-01-08 PROCEDURE — 02RF38Z REPLACEMENT OF AORTIC VALVE WITH ZOOPLASTIC TISSUE, PERCUTANEOUS APPROACH: ICD-10-PCS | Performed by: STUDENT IN AN ORGANIZED HEALTH CARE EDUCATION/TRAINING PROGRAM

## 2025-01-08 PROCEDURE — 93010 ELECTROCARDIOGRAM REPORT: CPT | Performed by: INTERNAL MEDICINE

## 2025-01-08 PROCEDURE — 6360000004 HC RX CONTRAST MEDICATION: Performed by: INTERNAL MEDICINE

## 2025-01-08 PROCEDURE — 3600000015 HC SURGERY LEVEL 5 ADDTL 15MIN: Performed by: INTERNAL MEDICINE

## 2025-01-08 PROCEDURE — 82962 GLUCOSE BLOOD TEST: CPT

## 2025-01-08 PROCEDURE — 37799 UNLISTED PX VASCULAR SURGERY: CPT

## 2025-01-08 PROCEDURE — 6370000000 HC RX 637 (ALT 250 FOR IP): Performed by: PHYSICIAN ASSISTANT

## 2025-01-08 PROCEDURE — 93325 DOPPLER ECHO COLOR FLOW MAPG: CPT | Performed by: INTERNAL MEDICINE

## 2025-01-08 PROCEDURE — 2580000003 HC RX 258: Performed by: PHYSICIAN ASSISTANT

## 2025-01-08 PROCEDURE — 7100000000 HC PACU RECOVERY - FIRST 15 MIN

## 2025-01-08 PROCEDURE — C1725 CATH, TRANSLUMIN NON-LASER: HCPCS | Performed by: INTERNAL MEDICINE

## 2025-01-08 PROCEDURE — 3700000000 HC ANESTHESIA ATTENDED CARE: Performed by: INTERNAL MEDICINE

## 2025-01-08 PROCEDURE — 6360000002 HC RX W HCPCS: Performed by: INTERNAL MEDICINE

## 2025-01-08 PROCEDURE — 2580000003 HC RX 258

## 2025-01-08 PROCEDURE — 2500000003 HC RX 250 WO HCPCS: Performed by: NURSE ANESTHETIST, CERTIFIED REGISTERED

## 2025-01-08 PROCEDURE — 85027 COMPLETE CBC AUTOMATED: CPT

## 2025-01-08 PROCEDURE — 6360000002 HC RX W HCPCS

## 2025-01-08 PROCEDURE — 5A1223Z PERFORMANCE OF CARDIAC PACING, CONTINUOUS: ICD-10-PCS | Performed by: STUDENT IN AN ORGANIZED HEALTH CARE EDUCATION/TRAINING PROGRAM

## 2025-01-08 PROCEDURE — 3700000001 HC ADD 15 MINUTES (ANESTHESIA): Performed by: INTERNAL MEDICINE

## 2025-01-08 PROCEDURE — C1894 INTRO/SHEATH, NON-LASER: HCPCS | Performed by: INTERNAL MEDICINE

## 2025-01-08 PROCEDURE — 93321 DOPPLER ECHO F-UP/LMTD STD: CPT | Performed by: INTERNAL MEDICINE

## 2025-01-08 PROCEDURE — C1760 CLOSURE DEV, VASC: HCPCS | Performed by: INTERNAL MEDICINE

## 2025-01-08 PROCEDURE — 2700000000 HC OXYGEN THERAPY PER DAY

## 2025-01-08 PROCEDURE — 85610 PROTHROMBIN TIME: CPT

## 2025-01-08 PROCEDURE — 83735 ASSAY OF MAGNESIUM: CPT

## 2025-01-08 DEVICE — TAV EVFXPLUS-29 COMM US
Type: IMPLANTABLE DEVICE | Site: HEART | Status: FUNCTIONAL
Brand: EVOLUT™ FX+

## 2025-01-08 RX ORDER — PHENYLEPHRINE HCL IN 0.9% NACL 1 MG/10 ML
SYRINGE (ML) INTRAVENOUS
Status: DISCONTINUED | OUTPATIENT
Start: 2025-01-08 | End: 2025-01-08 | Stop reason: SDUPTHER

## 2025-01-08 RX ORDER — ROSUVASTATIN CALCIUM 5 MG/1
5 TABLET, COATED ORAL DAILY
Status: DISCONTINUED | OUTPATIENT
Start: 2025-01-08 | End: 2025-01-10 | Stop reason: HOSPADM

## 2025-01-08 RX ORDER — SODIUM CHLORIDE 0.9 % (FLUSH) 0.9 %
5-40 SYRINGE (ML) INJECTION EVERY 12 HOURS SCHEDULED
Status: DISCONTINUED | OUTPATIENT
Start: 2025-01-08 | End: 2025-01-10 | Stop reason: HOSPADM

## 2025-01-08 RX ORDER — SODIUM CHLORIDE 0.9 % (FLUSH) 0.9 %
5-40 SYRINGE (ML) INJECTION EVERY 12 HOURS SCHEDULED
Status: DISCONTINUED | OUTPATIENT
Start: 2025-01-08 | End: 2025-01-08 | Stop reason: HOSPADM

## 2025-01-08 RX ORDER — PANTOPRAZOLE SODIUM 40 MG/1
40 TABLET, DELAYED RELEASE ORAL
Status: DISCONTINUED | OUTPATIENT
Start: 2025-01-09 | End: 2025-01-10 | Stop reason: HOSPADM

## 2025-01-08 RX ORDER — ONDANSETRON 2 MG/ML
4 INJECTION INTRAMUSCULAR; INTRAVENOUS EVERY 6 HOURS PRN
Status: DISCONTINUED | OUTPATIENT
Start: 2025-01-08 | End: 2025-01-10 | Stop reason: HOSPADM

## 2025-01-08 RX ORDER — POTASSIUM CHLORIDE 1500 MG/1
40 TABLET, EXTENDED RELEASE ORAL PRN
Status: DISCONTINUED | OUTPATIENT
Start: 2025-01-08 | End: 2025-01-10 | Stop reason: HOSPADM

## 2025-01-08 RX ORDER — INSULIN LISPRO 100 [IU]/ML
0-12 INJECTION, SOLUTION INTRAVENOUS; SUBCUTANEOUS
Status: DISCONTINUED | OUTPATIENT
Start: 2025-01-08 | End: 2025-01-10 | Stop reason: HOSPADM

## 2025-01-08 RX ORDER — SODIUM CHLORIDE 9 MG/ML
INJECTION, SOLUTION INTRAVENOUS
Status: DISCONTINUED | OUTPATIENT
Start: 2025-01-08 | End: 2025-01-08 | Stop reason: SDUPTHER

## 2025-01-08 RX ORDER — INSULIN LISPRO 100 [IU]/ML
0-6 INJECTION, SOLUTION INTRAVENOUS; SUBCUTANEOUS NIGHTLY
Status: DISCONTINUED | OUTPATIENT
Start: 2025-01-08 | End: 2025-01-10 | Stop reason: HOSPADM

## 2025-01-08 RX ORDER — ARFORMOTEROL TARTRATE 15 UG/2ML
15 SOLUTION RESPIRATORY (INHALATION)
Status: DISCONTINUED | OUTPATIENT
Start: 2025-01-08 | End: 2025-01-10 | Stop reason: HOSPADM

## 2025-01-08 RX ORDER — MUPIROCIN 20 MG/G
OINTMENT TOPICAL ONCE
Status: COMPLETED | OUTPATIENT
Start: 2025-01-08 | End: 2025-01-08

## 2025-01-08 RX ORDER — SODIUM CHLORIDE 9 MG/ML
INJECTION, SOLUTION INTRAVENOUS CONTINUOUS
Status: DISCONTINUED | OUTPATIENT
Start: 2025-01-08 | End: 2025-01-08 | Stop reason: HOSPADM

## 2025-01-08 RX ORDER — LIDOCAINE HYDROCHLORIDE 10 MG/ML
INJECTION, SOLUTION EPIDURAL; INFILTRATION; INTRACAUDAL; PERINEURAL PRN
Status: DISCONTINUED | OUTPATIENT
Start: 2025-01-08 | End: 2025-01-08 | Stop reason: ALTCHOICE

## 2025-01-08 RX ORDER — ASPIRIN 325 MG
325 TABLET, DELAYED RELEASE (ENTERIC COATED) ORAL ONCE
Status: COMPLETED | OUTPATIENT
Start: 2025-01-08 | End: 2025-01-08

## 2025-01-08 RX ORDER — ACETAMINOPHEN 500 MG
1000 TABLET ORAL EVERY 6 HOURS PRN
Status: DISCONTINUED | OUTPATIENT
Start: 2025-01-08 | End: 2025-01-10 | Stop reason: HOSPADM

## 2025-01-08 RX ORDER — POTASSIUM CHLORIDE 7.45 MG/ML
10 INJECTION INTRAVENOUS PRN
Status: DISCONTINUED | OUTPATIENT
Start: 2025-01-08 | End: 2025-01-10 | Stop reason: HOSPADM

## 2025-01-08 RX ORDER — ONDANSETRON 4 MG/1
4 TABLET, ORALLY DISINTEGRATING ORAL EVERY 8 HOURS PRN
Status: DISCONTINUED | OUTPATIENT
Start: 2025-01-08 | End: 2025-01-10 | Stop reason: HOSPADM

## 2025-01-08 RX ORDER — HEPARIN SODIUM 1000 [USP'U]/ML
INJECTION, SOLUTION INTRAVENOUS; SUBCUTANEOUS PRN
Status: DISCONTINUED | OUTPATIENT
Start: 2025-01-08 | End: 2025-01-08 | Stop reason: HOSPADM

## 2025-01-08 RX ORDER — ONDANSETRON 2 MG/ML
INJECTION INTRAMUSCULAR; INTRAVENOUS
Status: DISCONTINUED | OUTPATIENT
Start: 2025-01-08 | End: 2025-01-08 | Stop reason: SDUPTHER

## 2025-01-08 RX ORDER — ALBUTEROL SULFATE 0.83 MG/ML
2.5 SOLUTION RESPIRATORY (INHALATION) EVERY 6 HOURS PRN
Status: DISCONTINUED | OUTPATIENT
Start: 2025-01-08 | End: 2025-01-10 | Stop reason: HOSPADM

## 2025-01-08 RX ORDER — SODIUM CHLORIDE 0.9 % (FLUSH) 0.9 %
5-40 SYRINGE (ML) INJECTION PRN
Status: DISCONTINUED | OUTPATIENT
Start: 2025-01-08 | End: 2025-01-08 | Stop reason: HOSPADM

## 2025-01-08 RX ORDER — SODIUM CHLORIDE 9 MG/ML
INJECTION, SOLUTION INTRAVENOUS PRN
Status: DISCONTINUED | OUTPATIENT
Start: 2025-01-08 | End: 2025-01-08 | Stop reason: HOSPADM

## 2025-01-08 RX ORDER — PROTAMINE SULFATE 10 MG/ML
INJECTION, SOLUTION INTRAVENOUS
Status: DISCONTINUED | OUTPATIENT
Start: 2025-01-08 | End: 2025-01-08 | Stop reason: SDUPTHER

## 2025-01-08 RX ORDER — PROPOFOL 10 MG/ML
INJECTION, EMULSION INTRAVENOUS
Status: DISCONTINUED | OUTPATIENT
Start: 2025-01-08 | End: 2025-01-08 | Stop reason: SDUPTHER

## 2025-01-08 RX ORDER — CIPROFLOXACIN 250 MG/1
250 TABLET, FILM COATED ORAL 2 TIMES DAILY
Status: DISPENSED | OUTPATIENT
Start: 2025-01-08 | End: 2025-01-09

## 2025-01-08 RX ORDER — SODIUM CHLORIDE 9 MG/ML
INJECTION, SOLUTION INTRAVENOUS PRN
Status: DISCONTINUED | OUTPATIENT
Start: 2025-01-08 | End: 2025-01-10 | Stop reason: HOSPADM

## 2025-01-08 RX ORDER — SODIUM CHLORIDE 9 MG/ML
INJECTION, SOLUTION INTRAVENOUS PRN
Status: DISCONTINUED | OUTPATIENT
Start: 2025-01-08 | End: 2025-01-08

## 2025-01-08 RX ORDER — DEXAMETHASONE SODIUM PHOSPHATE 10 MG/ML
INJECTION INTRAMUSCULAR; INTRAVENOUS
Status: DISCONTINUED | OUTPATIENT
Start: 2025-01-08 | End: 2025-01-08 | Stop reason: SDUPTHER

## 2025-01-08 RX ORDER — MAGNESIUM SULFATE IN WATER 40 MG/ML
2000 INJECTION, SOLUTION INTRAVENOUS PRN
Status: DISCONTINUED | OUTPATIENT
Start: 2025-01-08 | End: 2025-01-10 | Stop reason: HOSPADM

## 2025-01-08 RX ORDER — GLUCAGON 1 MG/ML
1 KIT INJECTION PRN
Status: DISCONTINUED | OUTPATIENT
Start: 2025-01-08 | End: 2025-01-10 | Stop reason: HOSPADM

## 2025-01-08 RX ORDER — LIDOCAINE HYDROCHLORIDE 10 MG/ML
INJECTION, SOLUTION INFILTRATION; PERINEURAL
Status: DISCONTINUED
Start: 2025-01-08 | End: 2025-01-08

## 2025-01-08 RX ORDER — CALCIUM CHLORIDE 100 MG/ML
INJECTION INTRAVENOUS; INTRAVENTRICULAR
Status: DISCONTINUED | OUTPATIENT
Start: 2025-01-08 | End: 2025-01-08 | Stop reason: SDUPTHER

## 2025-01-08 RX ORDER — BUDESONIDE 0.5 MG/2ML
0.5 INHALANT ORAL
Status: DISCONTINUED | OUTPATIENT
Start: 2025-01-08 | End: 2025-01-10 | Stop reason: HOSPADM

## 2025-01-08 RX ORDER — SODIUM CHLORIDE 0.9 % (FLUSH) 0.9 %
5-40 SYRINGE (ML) INJECTION PRN
Status: DISCONTINUED | OUTPATIENT
Start: 2025-01-08 | End: 2025-01-10 | Stop reason: HOSPADM

## 2025-01-08 RX ORDER — CARVEDILOL 3.12 MG/1
3.12 TABLET ORAL 2 TIMES DAILY WITH MEALS
Status: DISCONTINUED | OUTPATIENT
Start: 2025-01-08 | End: 2025-01-10 | Stop reason: HOSPADM

## 2025-01-08 RX ORDER — DEXTROSE MONOHYDRATE 100 MG/ML
INJECTION, SOLUTION INTRAVENOUS CONTINUOUS PRN
Status: DISCONTINUED | OUTPATIENT
Start: 2025-01-08 | End: 2025-01-10 | Stop reason: HOSPADM

## 2025-01-08 RX ORDER — IOPAMIDOL 612 MG/ML
INJECTION, SOLUTION INTRAVASCULAR PRN
Status: DISCONTINUED | OUTPATIENT
Start: 2025-01-08 | End: 2025-01-08 | Stop reason: HOSPADM

## 2025-01-08 RX ADMIN — SODIUM CHLORIDE: 9 INJECTION, SOLUTION INTRAVENOUS at 07:29

## 2025-01-08 RX ADMIN — PROPOFOL 50 MCG/KG/MIN: 10 INJECTION, EMULSION INTRAVENOUS at 07:26

## 2025-01-08 RX ADMIN — SODIUM CHLORIDE, PRESERVATIVE FREE 10 ML: 5 INJECTION INTRAVENOUS at 22:07

## 2025-01-08 RX ADMIN — CEFAZOLIN 2000 MG: 2 INJECTION, POWDER, FOR SOLUTION INTRAMUSCULAR; INTRAVENOUS at 07:30

## 2025-01-08 RX ADMIN — Medication 50 MCG: at 08:07

## 2025-01-08 RX ADMIN — Medication 100 MCG: at 09:01

## 2025-01-08 RX ADMIN — Medication 20 MG: at 07:58

## 2025-01-08 RX ADMIN — Medication 100 MCG: at 09:04

## 2025-01-08 RX ADMIN — Medication 100 MCG: at 08:55

## 2025-01-08 RX ADMIN — IPRATROPIUM BROMIDE 0.5 MG: 0.5 SOLUTION RESPIRATORY (INHALATION) at 15:17

## 2025-01-08 RX ADMIN — Medication 50 MCG: at 08:29

## 2025-01-08 RX ADMIN — BUDESONIDE INHALATION 500 MCG: 0.5 SUSPENSION RESPIRATORY (INHALATION) at 19:06

## 2025-01-08 RX ADMIN — SODIUM CHLORIDE 1 MCG/KG/HR: 9 INJECTION, SOLUTION INTRAVENOUS at 07:27

## 2025-01-08 RX ADMIN — ARFORMOTEROL TARTRATE 15 MCG: 15 SOLUTION RESPIRATORY (INHALATION) at 19:06

## 2025-01-08 RX ADMIN — ASPIRIN 325 MG: 325 TABLET, COATED ORAL at 05:57

## 2025-01-08 RX ADMIN — SODIUM CHLORIDE: 9 INJECTION, SOLUTION INTRAVENOUS at 05:56

## 2025-01-08 RX ADMIN — ONDANSETRON 4 MG: 2 INJECTION, SOLUTION INTRAMUSCULAR; INTRAVENOUS at 08:12

## 2025-01-08 RX ADMIN — CIPROFLOXACIN HYDROCHLORIDE 250 MG: 250 TABLET, FILM COATED ORAL at 22:07

## 2025-01-08 RX ADMIN — PROTAMINE SULFATE 100 MG: 10 INJECTION, SOLUTION INTRAVENOUS at 08:57

## 2025-01-08 RX ADMIN — Medication 100 MCG: at 08:44

## 2025-01-08 RX ADMIN — ROSUVASTATIN CALCIUM 5 MG: 5 TABLET, FILM COATED ORAL at 16:02

## 2025-01-08 RX ADMIN — Medication 100 MCG: at 08:35

## 2025-01-08 RX ADMIN — BUDESONIDE INHALATION 500 MCG: 0.5 SUSPENSION RESPIRATORY (INHALATION) at 10:27

## 2025-01-08 RX ADMIN — DEXAMETHASONE SODIUM PHOSPHATE 10 MG: 10 INJECTION INTRAMUSCULAR; INTRAVENOUS at 08:12

## 2025-01-08 RX ADMIN — IPRATROPIUM BROMIDE 0.5 MG: 0.5 SOLUTION RESPIRATORY (INHALATION) at 10:27

## 2025-01-08 RX ADMIN — IPRATROPIUM BROMIDE 0.5 MG: 0.5 SOLUTION RESPIRATORY (INHALATION) at 19:05

## 2025-01-08 RX ADMIN — CARVEDILOL 3.12 MG: 6.25 TABLET, FILM COATED ORAL at 16:02

## 2025-01-08 RX ADMIN — WATER 2000 MG: 1 INJECTION INTRAMUSCULAR; INTRAVENOUS; SUBCUTANEOUS at 15:47

## 2025-01-08 RX ADMIN — Medication 100 MCG: at 08:17

## 2025-01-08 RX ADMIN — Medication 100 MCG: at 09:14

## 2025-01-08 RX ADMIN — MUPIROCIN: 20 OINTMENT TOPICAL at 06:02

## 2025-01-08 RX ADMIN — ARFORMOTEROL TARTRATE 15 MCG: 15 SOLUTION RESPIRATORY (INHALATION) at 10:27

## 2025-01-08 RX ADMIN — CALCIUM CHLORIDE 0.25 G: 100 INJECTION, SOLUTION INTRAVENOUS at 09:20

## 2025-01-08 RX ADMIN — Medication 100 MCG: at 08:46

## 2025-01-08 RX ADMIN — Medication 50 MCG: at 07:58

## 2025-01-08 RX ADMIN — Medication 10 MG: at 07:27

## 2025-01-08 ASSESSMENT — PAIN SCALES - GENERAL
PAINLEVEL_OUTOF10: 0
PAINLEVEL_OUTOF10: 0

## 2025-01-08 ASSESSMENT — PAIN - FUNCTIONAL ASSESSMENT: PAIN_FUNCTIONAL_ASSESSMENT: 0-10

## 2025-01-08 ASSESSMENT — COPD QUESTIONNAIRES: CAT_SEVERITY: MODERATE

## 2025-01-08 NOTE — ANESTHESIA PRE PROCEDURE
ventricle size is normal. Normal systolic function. TAPSE is 1.7 cm.    Left Atrium: Left atrium is dilated.    Mitral Valve: Mild regurgitation. MV mean gradient is 4 mmHg.    Tricuspid Valve: Mild regurgitation. The estimated RVSP is 44 mmHg.    Aortic Valve: Fibrocalcific changes present. No regurgitation. Severe stenosis. AV mean gradient is 35 mmHg. AV peak velocity is 3.9 m/s. AV Velocity Ratio is 0.26. LVOT:AV VTI Index is 0.25. LVOT diameter is 2.0 cm. AV area by continuity VTI is 0.8 cm2. AV area by peak velocity is 0.8 cm2. AV Stroke Volume index is 41 mL/m2.    CATH:  Diagnostic findings:  Dominance: Right  Coronary arteries:  LMCA: Large, minimal irregularities  LAD: Large, minimal irregularities  Ramus: Small, minimal irregularities  LCX: Large, minimal irregularities  RCA: Large, mild diffuse 30% stenosis with focal distal 50% stenosis  LVEDP: LVEDP 17 mmHg  Right heart cath:  PA 39/23 mmHg (mean PAP 30 mmHg)  RV 38/10 mmHg  RA 4 mmHg  PCWP 15 mmHg  Severe aortic stenosis with peak/mean gradients of 51/45 m of mercury on LV-AO pullback, MIROSLAVA 0.43 cm².  Elaine cardiac output 5.3 L/min. Cardiac index of 2.7 L/min/m2         Anesthesia Plan      general     ASA 4       Induction: intravenous.  arterial line, BIS and MOLLY  MIPS: Prophylactic antiemetics administered.  Anesthetic plan and risks discussed with patient.    Use of blood products discussed with patient whom consented to blood products.    Plan discussed with CRNA and attending.                    Brandin Mcdonnell, RN   1/8/2025

## 2025-01-08 NOTE — PROGRESS NOTES
4 Eyes Skin Assessment     NAME:  Jayne Weaver  YOB: 1940  MEDICAL RECORD NUMBER:  [id number]    The patient is being assessed for  Admission    I agree that at least one RN has performed a thorough Head to Toe Skin Assessment on the patient. ALL assessment sites listed below have been assessed.      Areas assessed by both nurses:    Head, Face, Ears, Shoulders, Back, Chest, Arms, Elbows, Hands, Sacrum. Buttock, Coccyx, Ischium, Legs. Feet and Heels, and Under Medical Devices         Does the Patient have a Wound? No noted wound(s)       Franco Prevention initiated by RN: Yes  Wound Care Orders initiated by RN: No    Pressure Injury (Stage 3,4, Unstageable, DTI, NWPT, and Complex wounds) if present, place Wound referral order by RN under : No    New Ostomies, if present place, Ostomy referral order under : No     Nurse 1 eSignature: Electronically signed by Eduardo Chang RN on 1/8/25 at 10:19 AM EST    **SHARE this note so that the co-signing nurse can place an eSignature**    Nurse 2 eSignature: Electronically signed by Benjamin Villeda RN on 1/8/25 at 2:34 PM EST

## 2025-01-08 NOTE — ANESTHESIA PROCEDURE NOTES
Arterial Line:    An arterial line was placed using surface landmarks, in the holding area for the following indication(s): continuous blood pressure monitoring and blood sampling needed.    A 20 gauge (size), 1 and 3/8 inch (length), Arrow (type) catheter was placed, Seldinger technique not used, into the left radial artery, secured by tape and Tegaderm.  Anesthesia type: Local  Local infiltration: Injection    Events:  patient tolerated procedure well with no complications.1/8/2025 6:47 AM  Resident/CRNA: Agata Good APRN - CRNA  Performed: Resident/CRNA   Preanesthetic Checklist  Completed: patient identified, IV checked, site marked, risks and benefits discussed, surgical/procedural consents, equipment checked, pre-op evaluation, timeout performed, anesthesia consent given, oxygen available and monitors applied/VS acknowledged

## 2025-01-08 NOTE — OP NOTE
TRANSCATHETER AORTIC VALVE REPLACEMENT  Operative Note      Date of procedure:  1/8/2025    Interventional Cardiologist: Charles Morton MD  Cardiothoracic Surgeon: Mojgan Davidson DO    Pre-operative diagnosis: Severe symptomatic aortic stenosis.   Post-operative diagnosis: Successful transcatheter aortic valve replacement (TAVR) using a 29-mm Evolut Fx+ valve.     senior living (Major co morbidities and conditions):   atrial fibrillation, COPD, and frailty  NYHA class III    STS-PROM estimated risk for 30-day mortality for AVR:  7.9%    The treatment plan was formulated by the Heart Team and the patient using available, professional society-provided shared decision making tools.     TAVR access: right common femoral artery percutaneous approach  Pigtail access: left common femoral artery  Appropriate arterial and venous lines were additionally placed by anesthesia as needed.    TAVR access closure: Prostyle Perclose   Pigtail access closure: Prostyle Perclose    Procedure:   Arterial access in the TAVR and Pigtail access sites as above  Limited peripheral angiography   Temporary transvenous pacer insertion via left femoral vein   Aortic root angiography  Transfemoral TAVR using a 29-mm Evolut Fx+ valve      Anesthesia: deep sedation/MAC    Indication for procedure:   Severe symptomatic aortic stenosis    “Severe aortic stenosis Shared Decision Making discussion took place using the [employer] AS: TAVR/SAVR tool. The treatment plan formulated by the Heart Team and the patient & the patient’s preference for AVR is TAVR”    Description of the Procedure:   Following informed consent, the patient was bought to the hybrid OR and placed under anesthesia as above. Transthoracic echo was used to aid in guidance of the procedure.      Using ultrasound guidance and angiographic confirmation, a a micropuncture needle  was used to access the right common femoral artery and a placeholder sheath was

## 2025-01-08 NOTE — PROGRESS NOTES
Pt received to 3816 from OR. Report received from anesthesia. Hemodynamic lines leveled and zeroed, placed on monitor, o2 applied 5L.

## 2025-01-08 NOTE — PROGRESS NOTES
Patient received to 3816 with the following belongings: gloves, underwear, phone, , dentures, inhaler, glasses . The following belongings admitted with the patient. Patients clothes, were sent home with the patients family

## 2025-01-08 NOTE — ANESTHESIA POSTPROCEDURE EVALUATION
Department of Anesthesiology  Postprocedure Note    Patient: Jayne Weaver  MRN: [id number]  YOB: 1940  Date of evaluation: 1/8/2025    Procedure Summary       Date: 01/08/25 Room / Location: [address]    Anesthesia Start: 0630 Anesthesia Stop: 0930    Procedures:       TRANSCATHETER AORTIC VALVE REPLACEMENT FEMORAL APPROACH (Chest)      TRANSCATHETER AORTIC VALVE REPLACEMENT FEMORAL APPROACH (Chest) Diagnosis:       Nodular calcific aortic valve stenosis      (AS)    Surgeons: Charles Morton MD; Mojgan Davidson DO Responsible Provider: Joni Guan DO    Anesthesia Type: general ASA Status: 4            Anesthesia Type: No value filed.    Raimundo Phase I: Raimundo Score: 10    Raimundo Phase II:      Anesthesia Post Evaluation    Patient location during evaluation: ICU  Patient participation: complete - patient cannot participate  Level of consciousness: awake and alert  Airway patency: patent  Nausea & Vomiting: no nausea and no vomiting  Cardiovascular status: blood pressure returned to baseline  Respiratory status: acceptable  Hydration status: euvolemic  Multimodal analgesia pain management approach    There were no known notable events for this encounter.

## 2025-01-09 ENCOUNTER — APPOINTMENT (OUTPATIENT)
Age: 85
DRG: 267 | End: 2025-01-09
Attending: INTERNAL MEDICINE
Payer: MEDICARE

## 2025-01-09 LAB
ACTIVATED CLOTTING TIME, LOW RANGE: 156 SEC
ACTIVATED CLOTTING TIME, LOW RANGE: 163 SEC
ACTIVATED CLOTTING TIME, LOW RANGE: 356 SEC
ANION GAP SERPL CALCULATED.3IONS-SCNC: 7 MMOL/L (ref 7–16)
BASOPHILS # BLD: 0 K/UL (ref 0–0.2)
BASOPHILS NFR BLD: 0 % (ref 0–2)
BUN SERPL-MCNC: 19 MG/DL (ref 6–23)
CALCIUM SERPL-MCNC: 9.3 MG/DL (ref 8.6–10.2)
CHLORIDE SERPL-SCNC: 104 MMOL/L (ref 98–107)
CO2 SERPL-SCNC: 28 MMOL/L (ref 22–29)
CREAT SERPL-MCNC: 0.7 MG/DL (ref 0.5–1)
ECHO AR MAX VEL PISA: 4 M/S
ECHO AV AREA PEAK VELOCITY: 1.8 CM2
ECHO AV AREA VTI: 1.9 CM2
ECHO AV AREA/BSA PEAK VELOCITY: 0.9 CM2/M2
ECHO AV AREA/BSA VTI: 1 CM2/M2
ECHO AV CUSP MM: 0.9 CM
ECHO AV MEAN GRADIENT: 11 MMHG
ECHO AV MEAN VELOCITY: 1.5 M/S
ECHO AV PEAK GRADIENT: 19 MMHG
ECHO AV PEAK VELOCITY: 2.2 M/S
ECHO AV REGURGITANT PHT: 299.5 MILLISECOND
ECHO AV VELOCITY RATIO: 0.59
ECHO AV VTI: 45.2 CM
ECHO BSA: 2.02 M2
ECHO BSA: 2.02 M2
ECHO EST RA PRESSURE: 3 MMHG
ECHO LA DIAMETER INDEX: 2.07 CM/M2
ECHO LA DIAMETER: 4 CM
ECHO LV EF PHYSICIAN: 60 %
ECHO LV FRACTIONAL SHORTENING: 31 % (ref 28–44)
ECHO LV INTERNAL DIMENSION DIASTOLE INDEX: 2.33 CM/M2
ECHO LV INTERNAL DIMENSION DIASTOLIC: 4.5 CM (ref 3.9–5.3)
ECHO LV INTERNAL DIMENSION SYSTOLIC INDEX: 1.61 CM/M2
ECHO LV INTERNAL DIMENSION SYSTOLIC: 3.1 CM
ECHO LV IVSD: 1 CM (ref 0.6–0.9)
ECHO LV IVSS: 1.3 CM
ECHO LV MASS 2D: 142.9 G (ref 67–162)
ECHO LV MASS INDEX 2D: 74 G/M2 (ref 43–95)
ECHO LV POSTERIOR WALL DIASTOLIC: 0.9 CM (ref 0.6–0.9)
ECHO LV POSTERIOR WALL SYSTOLIC: 1.2 CM
ECHO LV RELATIVE WALL THICKNESS RATIO: 0.4
ECHO LVOT AREA: 3.1 CM2
ECHO LVOT AV VTI INDEX: 0.62
ECHO LVOT DIAM: 2 CM
ECHO LVOT MEAN GRADIENT: 4 MMHG
ECHO LVOT PEAK GRADIENT: 7 MMHG
ECHO LVOT PEAK VELOCITY: 1.3 M/S
ECHO LVOT STROKE VOLUME INDEX: 45.4 ML/M2
ECHO LVOT SV: 87.6 ML
ECHO LVOT VTI: 27.9 CM
ECHO RIGHT VENTRICULAR SYSTOLIC PRESSURE (RVSP): 36 MMHG
ECHO RV INTERNAL DIMENSION: 3.6 CM
ECHO TV REGURGITANT MAX VELOCITY: 2.87 M/S
ECHO TV REGURGITANT PEAK GRADIENT: 33 MMHG
EKG ATRIAL RATE: 67 BPM
EKG P AXIS: 12 DEGREES
EKG P-R INTERVAL: 288 MS
EKG Q-T INTERVAL: 480 MS
EKG QRS DURATION: 144 MS
EKG QTC CALCULATION (BAZETT): 507 MS
EKG R AXIS: -29 DEGREES
EKG T AXIS: 39 DEGREES
EKG VENTRICULAR RATE: 67 BPM
EOSINOPHIL # BLD: 0 K/UL (ref 0.05–0.5)
EOSINOPHILS RELATIVE PERCENT: 0 % (ref 0–6)
ERYTHROCYTE [DISTWIDTH] IN BLOOD BY AUTOMATED COUNT: 12.2 % (ref 11.5–15)
ERYTHROCYTE [DISTWIDTH] IN BLOOD BY AUTOMATED COUNT: 12.4 % (ref 11.5–15)
GFR, ESTIMATED: 86 ML/MIN/1.73M2
GLUCOSE BLD-MCNC: 116 MG/DL (ref 74–99)
GLUCOSE BLD-MCNC: 121 MG/DL (ref 74–99)
GLUCOSE BLD-MCNC: 95 MG/DL (ref 74–99)
GLUCOSE BLD-MCNC: 96 MG/DL (ref 74–99)
GLUCOSE SERPL-MCNC: 124 MG/DL (ref 74–99)
HCT VFR BLD AUTO: 38.7 % (ref 34–48)
HCT VFR BLD AUTO: 41.4 % (ref 34–48)
HGB BLD-MCNC: 12.8 G/DL (ref 11.5–15.5)
HGB BLD-MCNC: 13.6 G/DL (ref 11.5–15.5)
INR PPP: 1.1
LYMPHOCYTES NFR BLD: 2.86 K/UL (ref 1.5–4)
LYMPHOCYTES RELATIVE PERCENT: 12 % (ref 20–42)
MAGNESIUM SERPL-MCNC: 2.1 MG/DL (ref 1.6–2.6)
MCH RBC QN AUTO: 32.4 PG (ref 26–35)
MCH RBC QN AUTO: 32.4 PG (ref 26–35)
MCHC RBC AUTO-ENTMCNC: 32.9 G/DL (ref 32–34.5)
MCHC RBC AUTO-ENTMCNC: 33.1 G/DL (ref 32–34.5)
MCV RBC AUTO: 98 FL (ref 80–99.9)
MCV RBC AUTO: 98.6 FL (ref 80–99.9)
MONOCYTES NFR BLD: 1.64 K/UL (ref 0.1–0.95)
MONOCYTES NFR BLD: 7 % (ref 2–12)
NEUTROPHILS NFR BLD: 81 % (ref 43–80)
NEUTS SEG NFR BLD: 18.6 K/UL (ref 1.8–7.3)
NUCLEATED RED BLOOD CELLS: 4 PER 100 WBC
PLATELET # BLD AUTO: 197 K/UL (ref 130–450)
PLATELET # BLD AUTO: 209 K/UL (ref 130–450)
PMV BLD AUTO: 10.3 FL (ref 7–12)
PMV BLD AUTO: 10.6 FL (ref 7–12)
POTASSIUM SERPL-SCNC: 5.1 MMOL/L (ref 3.5–5)
PROTHROMBIN TIME: 12.6 SEC (ref 9.3–12.4)
RBC # BLD AUTO: 3.95 M/UL (ref 3.5–5.5)
RBC # BLD AUTO: 4.2 M/UL (ref 3.5–5.5)
RBC # BLD: ABNORMAL 10*6/UL
SODIUM SERPL-SCNC: 139 MMOL/L (ref 132–146)
WBC OTHER # BLD: 23.1 K/UL (ref 4.5–11.5)
WBC OTHER # BLD: 23.7 K/UL (ref 4.5–11.5)

## 2025-01-09 PROCEDURE — 2700000000 HC OXYGEN THERAPY PER DAY

## 2025-01-09 PROCEDURE — 93308 TTE F-UP OR LMTD: CPT

## 2025-01-09 PROCEDURE — 93308 TTE F-UP OR LMTD: CPT | Performed by: INTERNAL MEDICINE

## 2025-01-09 PROCEDURE — 6360000002 HC RX W HCPCS: Performed by: PHYSICIAN ASSISTANT

## 2025-01-09 PROCEDURE — 2500000003 HC RX 250 WO HCPCS: Performed by: INTERNAL MEDICINE

## 2025-01-09 PROCEDURE — 2500000003 HC RX 250 WO HCPCS: Performed by: PHYSICIAN ASSISTANT

## 2025-01-09 PROCEDURE — 85025 COMPLETE CBC W/AUTO DIFF WBC: CPT

## 2025-01-09 PROCEDURE — 85027 COMPLETE CBC AUTOMATED: CPT

## 2025-01-09 PROCEDURE — 6360000002 HC RX W HCPCS: Performed by: NURSE PRACTITIONER

## 2025-01-09 PROCEDURE — 93010 ELECTROCARDIOGRAM REPORT: CPT | Performed by: INTERNAL MEDICINE

## 2025-01-09 PROCEDURE — 2140000000 HC CCU INTERMEDIATE R&B

## 2025-01-09 PROCEDURE — 94640 AIRWAY INHALATION TREATMENT: CPT

## 2025-01-09 PROCEDURE — 93321 DOPPLER ECHO F-UP/LMTD STD: CPT | Performed by: INTERNAL MEDICINE

## 2025-01-09 PROCEDURE — 83735 ASSAY OF MAGNESIUM: CPT

## 2025-01-09 PROCEDURE — 93325 DOPPLER ECHO COLOR FLOW MAPG: CPT | Performed by: INTERNAL MEDICINE

## 2025-01-09 PROCEDURE — 6370000000 HC RX 637 (ALT 250 FOR IP): Performed by: PHYSICIAN ASSISTANT

## 2025-01-09 PROCEDURE — 85610 PROTHROMBIN TIME: CPT

## 2025-01-09 PROCEDURE — 93005 ELECTROCARDIOGRAM TRACING: CPT | Performed by: PHYSICIAN ASSISTANT

## 2025-01-09 PROCEDURE — 82962 GLUCOSE BLOOD TEST: CPT

## 2025-01-09 PROCEDURE — 6370000000 HC RX 637 (ALT 250 FOR IP): Performed by: NURSE PRACTITIONER

## 2025-01-09 PROCEDURE — 36415 COLL VENOUS BLD VENIPUNCTURE: CPT

## 2025-01-09 PROCEDURE — 80048 BASIC METABOLIC PNL TOTAL CA: CPT

## 2025-01-09 RX ORDER — POLYETHYLENE GLYCOL 3350 17 G/17G
17 POWDER, FOR SOLUTION ORAL DAILY PRN
Status: DISCONTINUED | OUTPATIENT
Start: 2025-01-09 | End: 2025-01-10 | Stop reason: HOSPADM

## 2025-01-09 RX ORDER — FUROSEMIDE 10 MG/ML
20 INJECTION INTRAMUSCULAR; INTRAVENOUS ONCE
Status: COMPLETED | OUTPATIENT
Start: 2025-01-09 | End: 2025-01-09

## 2025-01-09 RX ADMIN — MICONAZOLE NITRATE: 2 POWDER TOPICAL at 22:19

## 2025-01-09 RX ADMIN — IPRATROPIUM BROMIDE 0.5 MG: 0.5 SOLUTION RESPIRATORY (INHALATION) at 20:03

## 2025-01-09 RX ADMIN — CARVEDILOL 3.12 MG: 6.25 TABLET, FILM COATED ORAL at 17:07

## 2025-01-09 RX ADMIN — WATER 2000 MG: 1 INJECTION INTRAMUSCULAR; INTRAVENOUS; SUBCUTANEOUS at 00:27

## 2025-01-09 RX ADMIN — SODIUM CHLORIDE, PRESERVATIVE FREE 10 ML: 5 INJECTION INTRAVENOUS at 10:05

## 2025-01-09 RX ADMIN — IPRATROPIUM BROMIDE 0.5 MG: 0.5 SOLUTION RESPIRATORY (INHALATION) at 00:58

## 2025-01-09 RX ADMIN — ARFORMOTEROL TARTRATE 15 MCG: 15 SOLUTION RESPIRATORY (INHALATION) at 09:55

## 2025-01-09 RX ADMIN — BUDESONIDE INHALATION 500 MCG: 0.5 SUSPENSION RESPIRATORY (INHALATION) at 20:03

## 2025-01-09 RX ADMIN — PANTOPRAZOLE SODIUM 40 MG: 40 TABLET, DELAYED RELEASE ORAL at 06:22

## 2025-01-09 RX ADMIN — FUROSEMIDE 20 MG: 10 INJECTION, SOLUTION INTRAMUSCULAR; INTRAVENOUS at 09:51

## 2025-01-09 RX ADMIN — APIXABAN 5 MG: 5 TABLET, FILM COATED ORAL at 22:18

## 2025-01-09 RX ADMIN — SODIUM CHLORIDE, PRESERVATIVE FREE 10 ML: 5 INJECTION INTRAVENOUS at 22:18

## 2025-01-09 RX ADMIN — MICONAZOLE NITRATE: 2 POWDER TOPICAL at 11:25

## 2025-01-09 RX ADMIN — CARVEDILOL 3.12 MG: 6.25 TABLET, FILM COATED ORAL at 09:51

## 2025-01-09 RX ADMIN — WATER 2000 MG: 1 INJECTION INTRAMUSCULAR; INTRAVENOUS; SUBCUTANEOUS at 09:51

## 2025-01-09 RX ADMIN — ROSUVASTATIN CALCIUM 5 MG: 5 TABLET, FILM COATED ORAL at 09:52

## 2025-01-09 RX ADMIN — BUDESONIDE INHALATION 500 MCG: 0.5 SUSPENSION RESPIRATORY (INHALATION) at 09:55

## 2025-01-09 RX ADMIN — ARFORMOTEROL TARTRATE 15 MCG: 15 SOLUTION RESPIRATORY (INHALATION) at 20:03

## 2025-01-09 RX ADMIN — CIPROFLOXACIN HYDROCHLORIDE 250 MG: 250 TABLET, FILM COATED ORAL at 09:52

## 2025-01-09 RX ADMIN — APIXABAN 5 MG: 5 TABLET, FILM COATED ORAL at 09:52

## 2025-01-09 RX ADMIN — POLYETHYLENE GLYCOL 3350 17 G: 17 POWDER, FOR SOLUTION ORAL at 09:51

## 2025-01-09 RX ADMIN — IPRATROPIUM BROMIDE 0.5 MG: 0.5 SOLUTION RESPIRATORY (INHALATION) at 16:41

## 2025-01-09 RX ADMIN — WATER 2000 MG: 1 INJECTION INTRAMUSCULAR; INTRAVENOUS; SUBCUTANEOUS at 17:07

## 2025-01-09 RX ADMIN — IPRATROPIUM BROMIDE 0.5 MG: 0.5 SOLUTION RESPIRATORY (INHALATION) at 09:55

## 2025-01-09 ASSESSMENT — PAIN SCALES - GENERAL
PAINLEVEL_OUTOF10: 0

## 2025-01-09 NOTE — PATIENT CARE CONFERENCE
P Quality Flow/Interdisciplinary Rounds Progress Note        Quality Flow Rounds held on January 9, 2025    Disciplines Attending:  Bedside Nurse, , , and Nursing Unit Leadership    Jayne Weaver was admitted on 1/8/2025  5:01 AM    Anticipated Discharge Date:       Disposition:    Franco Score:  Franco Scale Score: 19    [facility] RISK OF UNPLANNED READMISSION 2.0             8.1 Total Score        Discussed patient goal for the day, patient clinical progression, and barriers to discharge.  The following Goal(s) of the Day/Commitment(s) have been identified:  discharge planning       Ivy Mabry RN  January 9, 2025

## 2025-01-09 NOTE — PROGRESS NOTES
4 Eyes Skin Assessment     NAME:  Jayne Weaver  YOB: 1940  MEDICAL RECORD NUMBER:  [id number]    The patient is being assessed for  Transfer to New Unit    I agree that at least one RN has performed a thorough Head to Toe Skin Assessment on the patient. ALL assessment sites listed below have been assessed.      Areas assessed by both nurses:    Head, Face, Ears, Shoulders, Back, Chest, Arms, Elbows, Hands, Sacrum. Buttock, Coccyx, Ischium, Legs. Feet and Heels, and Under Medical Devices         Does the Patient have a Wound? No noted wound(s)       Franco Prevention initiated by RN: No  Wound Care Orders initiated by RN: No    Pressure Injury (Stage 3,4, Unstageable, DTI, NWPT, and Complex wounds) if present, place Wound referral order by RN under : No    New Ostomies, if present place, Ostomy referral order under : No     Nurse 1 eSignature: Electronically signed by Jacquie Hughes RN on 1/9/25 at 12:03 AM EST    **SHARE this note so that the co-signing nurse can place an eSignature**    Nurse 2 eSignature: Electronically signed by Veena Degroot RN on 1/9/25 at 12:57 AM EST

## 2025-01-09 NOTE — PROGRESS NOTES
Physician Progress Note      PATIENT:               EDWINA COELHO  CSN #:                  [id number]  :                       1940  ADMIT DATE:       2025 5:01 AM  DISCH DATE:  RESPONDING  PROVIDER #:        Charles Morton MD          QUERY TEXT:    Patient with atrial fibrillation and is maintained on Eliquis. If possible,   please document in progress notes and discharge summary if you are evaluating   and/or treating any of the following:?  ?  The medical record reflects the following:  Risk Factors: 84 yr old female, HTN  Clinical Indicators: Paroxysmal A-fib  Treatment: Eliquis  Options provided:  -- Secondary hypercoagulable state in a patient with atrial fibrillation  -- Other - I will add my own diagnosis  -- Disagree - Not applicable / Not valid  -- Disagree - Clinically unable to determine / Unknown  -- Refer to Clinical Documentation Reviewer    PROVIDER RESPONSE TEXT:    This patient has secondary hypercoagulable state in a patient with atrial   fibrillation.    Query created by: Estelle Porter on 2025 11:22 AM      Electronically signed by:  Charles Morton MD 2025 4:15 PM

## 2025-01-09 NOTE — PROGRESS NOTES
Attempted to wean oxygen off patient; patient 89% room air. Patient placed back on 1L oxygen and now 94%.

## 2025-01-09 NOTE — DISCHARGE INSTRUCTIONS
extremity swelling or any other concerns   Follow Up   1/21/2025: 10:00 am - groin check    2/4/2025: 10:00 am - echocardiogram; 11:00 am - follow up with Sara Qureshi PA-C   If you have any questions please call our valve clinic coordinator at [phone]  If you need to reschedule, please call [phone]  If You Have Questions   Please call the Valve Clinic Coordinator ([phone]), Monday through Friday, from 8 a.m. to 4 p.m. We prefer that you call during these hours, if at all possible.   After hours, please call [phone], and ask for the on-call Interventional Cardiologist to be paged. ?

## 2025-01-09 NOTE — CONSULTS
Met with patient and discussed that their physician has ordered a referral to our outpatient Phase II Cardiac Rehabilitation program. Reviewed the benefits of cardiac rehabilitation based on their diagnosis and personal risk factors. Patient demonstrates strong interest in Cardiac Rehabilitation at this time.  Cardiac Rehabilitation brochure provided to patient/family. The Cardiac Rehabilitation Program has been provided the patient's referral information and pertinent patient details and history. The patient may call [facility] at [phone] for additional information or questions. Contact information for [facility] and other choices close to the patient's residence have been provided in the discharge instructions so that the patient may call and schedule an appointment when cleared by their physician. Thank you for the referral.

## 2025-01-10 VITALS
BODY MASS INDEX: 39.65 KG/M2 | HEIGHT: 61 IN | OXYGEN SATURATION: 91 % | DIASTOLIC BLOOD PRESSURE: 88 MMHG | RESPIRATION RATE: 18 BRPM | SYSTOLIC BLOOD PRESSURE: 111 MMHG | HEART RATE: 111 BPM | WEIGHT: 210 LBS | TEMPERATURE: 97.1 F

## 2025-01-10 LAB
ANION GAP SERPL CALCULATED.3IONS-SCNC: 9 MMOL/L (ref 7–16)
BUN SERPL-MCNC: 19 MG/DL (ref 6–23)
CALCIUM SERPL-MCNC: 9.3 MG/DL (ref 8.6–10.2)
CHLORIDE SERPL-SCNC: 101 MMOL/L (ref 98–107)
CO2 SERPL-SCNC: 30 MMOL/L (ref 22–29)
CREAT SERPL-MCNC: 0.7 MG/DL (ref 0.5–1)
EKG ATRIAL RATE: 89 BPM
EKG P-R INTERVAL: 272 MS
EKG Q-T INTERVAL: 414 MS
EKG QRS DURATION: 138 MS
EKG QTC CALCULATION (BAZETT): 503 MS
EKG R AXIS: -57 DEGREES
EKG T AXIS: 16 DEGREES
EKG VENTRICULAR RATE: 89 BPM
ERYTHROCYTE [DISTWIDTH] IN BLOOD BY AUTOMATED COUNT: 12.4 % (ref 11.5–15)
GFR, ESTIMATED: 84 ML/MIN/1.73M2
GLUCOSE BLD-MCNC: 93 MG/DL (ref 74–99)
GLUCOSE BLD-MCNC: 99 MG/DL (ref 74–99)
GLUCOSE SERPL-MCNC: 88 MG/DL (ref 74–99)
HCT VFR BLD AUTO: 39.1 % (ref 34–48)
HGB BLD-MCNC: 12.9 G/DL (ref 11.5–15.5)
INR PPP: 1.3
MAGNESIUM SERPL-MCNC: 2 MG/DL (ref 1.6–2.6)
MCH RBC QN AUTO: 32.3 PG (ref 26–35)
MCHC RBC AUTO-ENTMCNC: 33 G/DL (ref 32–34.5)
MCV RBC AUTO: 97.8 FL (ref 80–99.9)
PLATELET # BLD AUTO: 195 K/UL (ref 130–450)
PMV BLD AUTO: 10.6 FL (ref 7–12)
POTASSIUM SERPL-SCNC: 4.3 MMOL/L (ref 3.5–5)
PROTHROMBIN TIME: 14.6 SEC (ref 9.3–12.4)
RBC # BLD AUTO: 4 M/UL (ref 3.5–5.5)
SODIUM SERPL-SCNC: 140 MMOL/L (ref 132–146)
WBC OTHER # BLD: 15.3 K/UL (ref 4.5–11.5)

## 2025-01-10 PROCEDURE — 83735 ASSAY OF MAGNESIUM: CPT

## 2025-01-10 PROCEDURE — 94640 AIRWAY INHALATION TREATMENT: CPT

## 2025-01-10 PROCEDURE — 36415 COLL VENOUS BLD VENIPUNCTURE: CPT

## 2025-01-10 PROCEDURE — 93005 ELECTROCARDIOGRAM TRACING: CPT | Performed by: PHYSICIAN ASSISTANT

## 2025-01-10 PROCEDURE — 2500000003 HC RX 250 WO HCPCS: Performed by: PHYSICIAN ASSISTANT

## 2025-01-10 PROCEDURE — 2700000000 HC OXYGEN THERAPY PER DAY

## 2025-01-10 PROCEDURE — 6370000000 HC RX 637 (ALT 250 FOR IP): Performed by: PHYSICIAN ASSISTANT

## 2025-01-10 PROCEDURE — 85610 PROTHROMBIN TIME: CPT

## 2025-01-10 PROCEDURE — 82962 GLUCOSE BLOOD TEST: CPT

## 2025-01-10 PROCEDURE — 80048 BASIC METABOLIC PNL TOTAL CA: CPT

## 2025-01-10 PROCEDURE — 6360000002 HC RX W HCPCS: Performed by: PHYSICIAN ASSISTANT

## 2025-01-10 PROCEDURE — 93010 ELECTROCARDIOGRAM REPORT: CPT | Performed by: INTERNAL MEDICINE

## 2025-01-10 PROCEDURE — 85027 COMPLETE CBC AUTOMATED: CPT

## 2025-01-10 PROCEDURE — 6370000000 HC RX 637 (ALT 250 FOR IP): Performed by: NURSE PRACTITIONER

## 2025-01-10 RX ADMIN — IPRATROPIUM BROMIDE 0.5 MG: 0.5 SOLUTION RESPIRATORY (INHALATION) at 03:09

## 2025-01-10 RX ADMIN — BUDESONIDE INHALATION 500 MCG: 0.5 SUSPENSION RESPIRATORY (INHALATION) at 08:58

## 2025-01-10 RX ADMIN — SODIUM CHLORIDE, PRESERVATIVE FREE 10 ML: 5 INJECTION INTRAVENOUS at 08:57

## 2025-01-10 RX ADMIN — WATER 2000 MG: 1 INJECTION INTRAMUSCULAR; INTRAVENOUS; SUBCUTANEOUS at 01:16

## 2025-01-10 RX ADMIN — ROSUVASTATIN CALCIUM 5 MG: 5 TABLET, FILM COATED ORAL at 08:56

## 2025-01-10 RX ADMIN — POLYETHYLENE GLYCOL 3350 17 G: 17 POWDER, FOR SOLUTION ORAL at 10:44

## 2025-01-10 RX ADMIN — PANTOPRAZOLE SODIUM 40 MG: 40 TABLET, DELAYED RELEASE ORAL at 06:28

## 2025-01-10 RX ADMIN — ARFORMOTEROL TARTRATE 15 MCG: 15 SOLUTION RESPIRATORY (INHALATION) at 08:58

## 2025-01-10 RX ADMIN — IPRATROPIUM BROMIDE 0.5 MG: 0.5 SOLUTION RESPIRATORY (INHALATION) at 14:18

## 2025-01-10 RX ADMIN — IPRATROPIUM BROMIDE 0.5 MG: 0.5 SOLUTION RESPIRATORY (INHALATION) at 08:58

## 2025-01-10 RX ADMIN — APIXABAN 5 MG: 5 TABLET, FILM COATED ORAL at 08:57

## 2025-01-10 RX ADMIN — CARVEDILOL 3.12 MG: 6.25 TABLET, FILM COATED ORAL at 10:47

## 2025-01-10 RX ADMIN — MICONAZOLE NITRATE: 2 POWDER TOPICAL at 08:58

## 2025-01-10 NOTE — CARE COORDINATION
Transition of Care Initial:  Discharge order on chart.  POD#2 TAVR.  Met with the pt in room.  Remains on 1L NC.  Patient states that she lives alone in a 1st floor apartment.  There are no steps to enter.  Current DME:  cane, walker, bp cuff, and pulse oximetry.  Prior this admission patient reports being independent with ADLs.  PCP is Navdeep Rankin, and preferred pharmacy is [facility] in [place].  Plan discharge Home with no needs verbalized.  Her son Ras #[phone] will provide transportation at [place].  Patient needs a Zio prior discharge.  Nursing aware.  CM/SW will follow to make sure patient is weaned off O2.      Case Management Assessment  Initial Evaluation    Date/Time of Evaluation: 1/10/2025 10:45 AM  Assessment Completed by: Betty Lindsey    If patient is discharged prior to next notation, then this note serves as note for discharge by case management.    Patient Name: Jayne Weaver                   YOB: 1940  Diagnosis: Nodular calcific aortic valve stenosis [I35.0]                   Date / Time: 1/8/2025  5:01 AM    Patient Admission Status: Inpatient   Readmission Risk (Low < 19, Mod (19-27), High > 27): Readmission Risk Score: 9.6    Current PCP: Navdeep Rankin, DO  PCP verified by CM? Yes    Chart Reviewed: Yes      History Provided by: Patient  Patient Orientation: Alert and Oriented    Patient Cognition: Alert    Hospitalization in the last 30 days (Readmission):  No    If yes, Readmission Assessment in CM Navigator will be completed.    Advance Directives:      Code Status: Full Code     Discharge Planning:    Patient lives with: Alone Type of Home: Apartment  Primary Care Giver: Self  Patient Support Systems include: Family Members, Children       ADLS  Prior functional level: Independent in ADLs/IADLs  Current functional level: Independent in ADLs/IADLs      Family can provide assistance at DC: Yes  Would you like Case Management to discuss the discharge plan

## 2025-01-10 NOTE — PATIENT CARE CONFERENCE
P Quality Flow/Interdisciplinary Rounds Progress Note        Quality Flow Rounds held on January 10, 2025    Disciplines Attending:  Bedside Nurse, , , and Nursing Unit Leadership    Jayne Weaver was admitted on 1/8/2025  5:01 AM    Anticipated Discharge Date:       Disposition:    Franco Score:  Franco Scale Score: 19    [facility] RISK OF UNPLANNED READMISSION 2.0             9.2 Total Score        Discussed patient goal for the day, patient clinical progression, and barriers to discharge.  The following Goal(s) of the Day/Commitment(s) have been identified:  discharge planning       Ivy Mabry RN  January 10, 2025

## 2025-01-10 NOTE — PLAN OF CARE
Problem: Discharge Planning  Goal: Discharge to home or other facility with appropriate resources  1/10/2025 1711 by Ana Reed RN  Outcome: Adequate for Discharge     Problem: Pain  Goal: Verbalizes/displays adequate comfort level or baseline comfort level  1/10/2025 1711 by Ana Reed RN  Outcome: Adequate for Discharge     Problem: Safety - Adult  Goal: Free from fall injury  1/10/2025 1711 by Ana Reed RN  Outcome: Adequate for Discharge     Problem: ABCDS Injury Assessment  Goal: Absence of physical injury  1/10/2025 1711 by Ana Reed RN  Outcome: Adequate for Discharge

## 2025-01-10 NOTE — CARE COORDINATION
reached out to patients PCP office Dr Navdeep Rankin at [phone] to schedule follow up D/C appointment.  spoke to office staff and scheduled an appointment on 1/14 at 1:15 PM in office.     Please bring your discharge paperwork, new medications, ID, insurance card and co-pay if you have one.    Information added to D/C summary.

## 2025-01-10 NOTE — DISCHARGE SUMMARY
[facility]    DISCHARGE SUMMARY    DEMOGRAPHICS Jayne Weaver / 1940 (84 y.o.) / MRN [id number]   ADMIT / DC DATE 1/8/2025 - 1/10/2025 (LOS : 2)   ATTENDING Charles Adams MD   PRIMARY CARDIOLOGY: Dr. Norman   PRIMARY CARE: Navdeep Rankin DO      REASON FOR ADMIT: TAVR     DISPOSITION: Home   PLANNED READMIT: no         PRIMARY DISCHARGE DIAGNOSIS   1. S/p TAVR       SECONDARY DISCHARGE DIAGNOSIS    Need for lifelong endocarditis prophylaxis    New LBBB           ** Implanted Evolut is MRI compatible **     ** Prophylactic antibiotics required before dental work and avoid cleanings for 8 weeks post-procedure **         CONSULTANTS: cardiac rehab         SUMMARY OF PROCEDURES/STUDIES (see individual procedure notes for more details)    1. Successful R TF TAVR with 29 mm Evolut Fx+   -3 partial recaptures   -Trace PVL   2. Final LVEDP 27 mmHg     TTE POD1:    Left Ventricle: Normal left ventricular systolic function with a visually estimated EF of 60 - 65%. Left ventricle size is normal. Normal wall thickness. Normal wall motion.    Aortic Valve: Medtronic EVolut Fx+ bioprosthetic valve that is well-seated with a size of 29 mm. AV mean gradient is 11 mmHg. Trace paravalvular regurgitation. No stenosis.    Mitral Valve: Mild regurgitation.    Tricuspid Valve: Mild regurgitation. The estimated RVSP is 36 mmHg.    Image quality is adequate.          FOLLOW UP APPOINTMENTS   Future Appointments   Date Time Provider Department Center   1/21/2025 10:00 AM Sara Qureshi PA [place] [facility]   1/21/2025  1:00 PM Chema Norman MD [place] [facility]   2/4/2025 10:00 AM [facility]   2/4/2025 11:00 AM Sara Qureshi PA [place] [facility]   3/17/2025  1:00 PM Navdeep Rankin DO [place] [facility] DE[facility]           DISCHARGE MEDICATIONS   Current Discharge Medication List        CONTINUE these medications which have NOT CHANGED    Details   albuterol  sulfate HFA (VENTOLIN HFA) 108 (90 Base) MCG/ACT inhaler Inhale 2 puffs into the lungs every 6 hours as needed for Wheezing      acetaminophen (TYLENOL) 500 MG tablet Take 2 tablets by mouth every 6 hours as needed for Pain      Budeson-Glycopyrrol-Formoterol (BREZTRI AEROSPHERE) 160-9-4.8 MCG/ACT AERO Inhale 2 puffs into the lungs in the morning and at bedtime  Qty: 10.7 g, Refills: 5    Associated Diagnoses: Moderate COPD (chronic obstructive pulmonary disease) (HCC)      carvedilol (COREG) 3.125 MG tablet Take 1 tablet by mouth 2 times daily (with meals)  Qty: 60 tablet, Refills: 2      rosuvastatin (CRESTOR) 5 MG tablet TAKE ONE TABLET BY MOUTH EVERY DAY  Qty: 90 tablet, Refills: 1      pantoprazole (PROTONIX) 40 MG tablet TAKE ONE TABLET BY MOUTH TWICE A DAY BEFORE MEALS  Qty: 60 tablet, Refills: 0      apixaban (ELIQUIS) 5 MG TABS tablet Take 1 tablet by mouth 2 times daily  Qty: 180 tablet, Refills: 1      Multiple Vitamins-Minerals (CENTRUM PO) Take by mouth daily           STOP taking these medications       ciprofloxacin (CIPRO) 250 MG tablet Comments:   Reason for Stopping:                No Known Allergies       Jayne Weaver was admitted to the hospital and then taken to the [facility] OR for Transfemoral Transcatheter Aortic Valve Replacement completed via a right transfemoral approach with a 29mm Evolut Fx+ valve, which was completed without incidence. The patient was transferred  from the Hybrid OR suite to CVICU and ultimately to CSU for admission and overnight observation. There were no acute issues overnight and surgical pain was controlled with tylenol. The patient was able to get out of bed and walk after 6 hours of bedrest under direct nursing supervision, and then independently without vascular complications (hematoma, bruit, etc). Diet and activities were advanced without incidence. Standard post-procedure echocardiogram was completed with findings listed in above sections. Telemetry was

## 2025-01-10 NOTE — PLAN OF CARE
Problem: Discharge Planning  Goal: Discharge to home or other facility with appropriate resources  1/10/2025 1109 by Ana Reed, RN  Outcome: Progressing     Problem: Pain  Goal: Verbalizes/displays adequate comfort level or baseline comfort level  1/10/2025 1109 by Ana Reed, RN  Outcome: Progressing     Problem: Safety - Adult  Goal: Free from fall injury  1/10/2025 1109 by Ana Reed, RN  Outcome: Progressing     Problem: ABCDS Injury Assessment  Goal: Absence of physical injury  1/10/2025 1109 by Ana Reed, RN  Outcome: Progressing

## 2025-01-10 NOTE — PROGRESS NOTES
Discharge instructions explained to pt in detail. Heart monitor and IV's removed. Pt Zio patch applied and working.   Ana Reed RN

## 2025-01-10 NOTE — ACP (ADVANCE CARE PLANNING)
Advance Care Planning   The patient has the following advanced directives on file:  Advance Directives       Power of  Living Will ACP-Advance Directive ACP-Power of     Not on File Not on File Not on File Not on File            Contacts:    Primary Decision Maker: Fermin Bailon - Child - [phone]    Secondary Decision Maker: Estelle Peace - Child - [phone]    The Patient has the following current code status:    Code Status: Full Code

## 2025-01-10 NOTE — PLAN OF CARE
Problem: Discharge Planning  Goal: Discharge to home or other facility with appropriate resources  Outcome: Progressing     Problem: Pain  Goal: Verbalizes/displays adequate comfort level or baseline comfort level  Outcome: Progressing     Problem: Safety - Adult  Goal: Free from fall injury  Outcome: Progressing     Problem: ABCDS Injury Assessment  Goal: Absence of physical injury  Outcome: Progressing      No

## 2025-01-13 ENCOUNTER — CARE COORDINATION (OUTPATIENT)
Dept: CARE COORDINATION | Age: 85
End: 2025-01-13

## 2025-01-13 DIAGNOSIS — I35.0 NODULAR CALCIFIC AORTIC VALVE STENOSIS: Primary | ICD-10-CM

## 2025-01-13 PROCEDURE — 1111F DSCHRG MED/CURRENT MED MERGE: CPT | Performed by: FAMILY MEDICINE

## 2025-01-13 NOTE — CARE COORDINATION
Care Transitions Note    Initial Call - Call within 2 business days of discharge: Yes    Patient Current Location:  [place]    Care Transition Nurse contacted the patient by telephone to perform post hospital discharge assessment, verified name and  as identifiers. Provided introduction to self, and explanation of the Care Transition Nurse role.     Patient: Jayne Weaver    Patient : 1940   MRN: [id number]    Reason for Admission: LBBB, s/p TAVR   Discharge Date: 1/10/25  RURS: Readmission Risk Score: 9.6      Last Discharge Facility       Date Complaint Diagnosis Description Type Department Provider    25  Left bundle branch block ... Admission (Discharged) [facility]U [facility] Charles Morton MD     Additional needs identified to be addressed with provider   No needs identified             Method of communication with provider: none.    Patients top risk factors for readmission: medical condition-s/p TAVR     Interventions to address risk factors:   Review of patient management of conditions/medications: post op     Care Summary Note: Spoke with Jayne for initial low readmission risk score care transition call post hospital discharge. She reports that she is feeling \"pretty good\" today and is actually at a meeting where she is one of the officers. Jayne denies any bruising, redness, lumps, or tenderness to her BL groin sites. She confirmed she is ambulating without issue.     Jayne reports that she is wearing the Zio monitor without issue.     She plans to see her PCP tomorrow. She is scheduled with SON Rocha, next Tuesday, 24 for her post op echo and groin check and had a previously scheduled appt with Dr. Norman in [place] in the afternoon. She plans to reschedule with Dr. Norman.     Jayne reports that she she is unsure if she wants to proceed with cardiac rehab, stating she understands the importance, however, her apartment complex is starting a workout program in their day room. CTN encouraged her to

## 2025-01-14 ENCOUNTER — OFFICE VISIT (OUTPATIENT)
Dept: PRIMARY CARE CLINIC | Age: 85
End: 2025-01-14

## 2025-01-14 VITALS
BODY MASS INDEX: 39.46 KG/M2 | RESPIRATION RATE: 16 BRPM | WEIGHT: 209 LBS | DIASTOLIC BLOOD PRESSURE: 74 MMHG | SYSTOLIC BLOOD PRESSURE: 128 MMHG | HEART RATE: 80 BPM | HEIGHT: 61 IN

## 2025-01-14 DIAGNOSIS — I10 PRIMARY HYPERTENSION: ICD-10-CM

## 2025-01-14 DIAGNOSIS — Z79.01 CHRONIC ANTICOAGULATION: ICD-10-CM

## 2025-01-14 DIAGNOSIS — I48.19 PERSISTENT ATRIAL FIBRILLATION (HCC): ICD-10-CM

## 2025-01-14 DIAGNOSIS — Z09 HOSPITAL DISCHARGE FOLLOW-UP: Primary | ICD-10-CM

## 2025-01-14 DIAGNOSIS — J44.9 MODERATE COPD (CHRONIC OBSTRUCTIVE PULMONARY DISEASE) (HCC): ICD-10-CM

## 2025-01-14 DIAGNOSIS — R53.83 FATIGUE, UNSPECIFIED TYPE: ICD-10-CM

## 2025-01-14 DIAGNOSIS — E78.2 MIXED HYPERLIPIDEMIA: ICD-10-CM

## 2025-01-14 DIAGNOSIS — Z98.890 S/P HEART VALVE REPAIR: ICD-10-CM

## 2025-01-14 DIAGNOSIS — I38 VALVULAR HEART DISEASE: ICD-10-CM

## 2025-01-14 DIAGNOSIS — J44.9 CHRONIC OBSTRUCTIVE PULMONARY DISEASE, UNSPECIFIED COPD TYPE (HCC): ICD-10-CM

## 2025-01-14 RX ORDER — ROSUVASTATIN CALCIUM 5 MG/1
5 TABLET, COATED ORAL DAILY
Qty: 90 TABLET | Refills: 1 | Status: SHIPPED | OUTPATIENT
Start: 2025-01-14

## 2025-01-14 RX ORDER — BUDESONIDE, GLYCOPYRROLATE, AND FORMOTEROL FUMARATE 160; 9; 4.8 UG/1; UG/1; UG/1
2 AEROSOL, METERED RESPIRATORY (INHALATION) 2 TIMES DAILY
Qty: 10.7 G | Refills: 5 | Status: SHIPPED | OUTPATIENT
Start: 2025-01-14

## 2025-01-14 RX ORDER — PANTOPRAZOLE SODIUM 40 MG/1
40 TABLET, DELAYED RELEASE ORAL DAILY
Qty: 90 TABLET | Refills: 1 | Status: SHIPPED | OUTPATIENT
Start: 2025-01-14

## 2025-01-14 ASSESSMENT — PATIENT HEALTH QUESTIONNAIRE - PHQ9
SUM OF ALL RESPONSES TO PHQ QUESTIONS 1-9: 0
SUM OF ALL RESPONSES TO PHQ9 QUESTIONS 1 & 2: 0
SUM OF ALL RESPONSES TO PHQ QUESTIONS 1-9: 0
1. LITTLE INTEREST OR PLEASURE IN DOING THINGS: NOT AT ALL
2. FEELING DOWN, DEPRESSED OR HOPELESS: NOT AT ALL

## 2025-01-14 NOTE — PROGRESS NOTES
the lungs in the morning and at bedtime 10.7 g 5    pantoprazole (PROTONIX) 40 MG tablet Take 1 tablet by mouth daily 90 tablet 1    rosuvastatin (CRESTOR) 5 MG tablet Take 1 tablet by mouth daily 90 tablet 1    albuterol sulfate HFA (VENTOLIN HFA) 108 (90 Base) MCG/ACT inhaler Inhale 2 puffs into the lungs every 6 hours as needed for Wheezing      acetaminophen (TYLENOL) 500 MG tablet Take 2 tablets by mouth every 6 hours as needed for Pain      carvedilol (COREG) 3.125 MG tablet Take 1 tablet by mouth 2 times daily (with meals) 60 tablet 2    apixaban (ELIQUIS) 5 MG TABS tablet Take 1 tablet by mouth 2 times daily 180 tablet 1    Multiple Vitamins-Minerals (CENTRUM PO) Take by mouth daily          Medications patient taking as of now reconciled against medications ordered at time of hospital discharge: Yes    A comprehensive review of systems was negative except for what was noted in the HPI.    Objective:    /74   Pulse 80   Resp 16   Ht 1.549 m (5' 1\")   Wt 94.8 kg (209 lb)   BMI 39.49 kg/m²   General Appearance: alert and oriented to person, place and time, well developed and well- nourished, in no acute distress  Skin: warm and dry, no rash or erythema  Head: normocephalic and atraumatic  Eyes: pupils equal, round, and reactive to light, extraocular eye movements intact, conjunctivae normal  ENT: tympanic membrane, external ear and ear canal normal bilaterally, nose without deformity, nasal mucosa and turbinates normal without polyps  Neck: supple and non-tender without mass, no thyromegaly or thyroid nodules, no cervical lymphadenopathy  Pulmonary/Chest: clear to auscultation bilaterally- no wheezes, rales or rhonchi, normal air movement, no respiratory distress  Cardiovascular: normal rate, regular rhythm, normal S1 and S2, no murmurs, rubs, clicks, or gallops, distal pulses intact, no carotid bruits  Abdomen: soft, non-tender, non-distended, normal bowel sounds, no masses or 
[Follow-Up Visit] : a follow-up visit for

## 2025-01-17 ENCOUNTER — TELEPHONE (OUTPATIENT)
Age: 85
End: 2025-01-17

## 2025-01-17 NOTE — TELEPHONE ENCOUNTER
Left voicemail reminding patient of appointment with Sara Qureshi on 1/21/25, 1000.  Advised of location of [facility].

## 2025-01-18 LAB
ABO/RH: NORMAL
ANTIBODY SCREEN: NEGATIVE
ARM BAND NUMBER: NORMAL
BLOOD BANK DISPENSE STATUS: NORMAL
BLOOD BANK SAMPLE EXPIRATION: NORMAL
BPU ID: NORMAL
COMPONENT: NORMAL
CROSSMATCH RESULT: NORMAL
TRANSFUSION STATUS: NORMAL
UNIT DIVISION: 0

## 2025-01-21 ENCOUNTER — OFFICE VISIT (OUTPATIENT)
Dept: CARDIOLOGY CLINIC | Age: 85
End: 2025-01-21
Payer: MEDICARE

## 2025-01-21 VITALS
HEIGHT: 62 IN | WEIGHT: 209.4 LBS | DIASTOLIC BLOOD PRESSURE: 70 MMHG | BODY MASS INDEX: 38.53 KG/M2 | RESPIRATION RATE: 20 BRPM | HEART RATE: 88 BPM | SYSTOLIC BLOOD PRESSURE: 130 MMHG

## 2025-01-21 DIAGNOSIS — I35.0 NONRHEUMATIC AORTIC VALVE STENOSIS: ICD-10-CM

## 2025-01-21 DIAGNOSIS — Z95.2 HISTORY OF TRANSCATHETER AORTIC VALVE REPLACEMENT (TAVR): Primary | ICD-10-CM

## 2025-01-21 PROCEDURE — 3075F SYST BP GE 130 - 139MM HG: CPT | Performed by: PHYSICIAN ASSISTANT

## 2025-01-21 PROCEDURE — 1159F MED LIST DOCD IN RCRD: CPT | Performed by: PHYSICIAN ASSISTANT

## 2025-01-21 PROCEDURE — 3078F DIAST BP <80 MM HG: CPT | Performed by: PHYSICIAN ASSISTANT

## 2025-01-21 PROCEDURE — 1126F AMNT PAIN NOTED NONE PRSNT: CPT | Performed by: PHYSICIAN ASSISTANT

## 2025-01-21 PROCEDURE — 99213 OFFICE O/P EST LOW 20 MIN: CPT | Performed by: PHYSICIAN ASSISTANT

## 2025-01-21 PROCEDURE — 1123F ACP DISCUSS/DSCN MKR DOCD: CPT | Performed by: PHYSICIAN ASSISTANT

## 2025-01-21 NOTE — PATIENT INSTRUCTIONS
Return for echocardiogram and follow up with Sara on 2/4/25 at 10:00 am; call sooner if concerns arise in the meantime    Discontinue coreg and monitor blood pressure, lightheadedness, etc

## 2025-01-21 NOTE — PROGRESS NOTES
Determinants of Health     Financial Resource Strain: Low Risk  (3/7/2024)    Overall Financial Resource Strain (CARDIA)     Difficulty of Paying Living Expenses: Not hard at all   Food Insecurity: No Food Insecurity (2024)    Hunger Vital Sign     Worried About Running Out of Food in the Last Year: Never true     Ran Out of Food in the Last Year: Never true   Transportation Needs: No Transportation Needs (2024)    PRAPARE - Transportation     Lack of Transportation (Medical): No     Lack of Transportation (Non-Medical): No   Physical Activity: Inactive (3/7/2024)    Exercise Vital Sign     Days of Exercise per Week: 0 days     Minutes of Exercise per Session: 0 min   Stress: Not on file   Social Connections: Not on file   Intimate Partner Violence: Not on file   Housing Stability: Low Risk  (2024)    Housing Stability Vital Sign     Unable to Pay for Housing in the Last Year: No     Number of Places Lived in the Last Year: 1     Unstable Housing in the Last Year: No       Family History:  Family History   Problem Relation Age of Onset    Heart Attack Father 76        massive       Objective:  Vitals:    25 1014   BP: 130/70   Pulse: 88   Resp: 20   Layin/70, sitting 130/70, standing 110/60 - asymptomatic  General Appearance: Pleasant 84 y.o. year old female who appears stated age.  Communicates well, no acute distress.    HEENT: Head is normocephalic, atraumatic.  EOMs intact, PERRL.  Trachea midline.   Lungs: Normal respiratory rate and normal effort. She is not in respiratory distress. Breath sounds clear to auscultation. No wheezes.   Heart: Normal rate. Regular rhythm. S1 normal and S2 normal. No murmur.   Chest: Symmetric chest wall expansion.   Extremities: Normal range of motion. No edema.    Neurological: Patient is alert and oriented to person, place and time.   Skin: Warm and dry.   Abdomen: Abdomen is soft and non-distended. Bowel sounds are normal.   Pulses: Distal pulses are

## 2025-01-22 PROBLEM — I44.7 LEFT BUNDLE BRANCH BLOCK: Status: ACTIVE | Noted: 2025-01-22

## 2025-01-23 ENCOUNTER — CARE COORDINATION (OUTPATIENT)
Dept: CARE COORDINATION | Age: 85
End: 2025-01-23

## 2025-01-23 NOTE — CARE COORDINATION
Care Transitions Note    Follow Up Call     Patient Current Location:  Home: [address]  [address]  [place] [address]    Care Transition Nurse contacted the patient by telephone. Verified name and  as identifiers.    Additional needs identified to be addressed with provider   No needs identified           Method of communication with provider: none.    Care Summary Note: Spoke with Jayne for follow up care transition call. She reports that she is feeling \"pretty good\" today. She stopped the Coreg after being instructed to do so by SON Rocha, at her follow up appt on 25. She continues to feel somewhat foggy but feels the lightheaded feeling is improving.   She continues to wear the Zio monitor and reports that she was sent 2 additional monitors because hers \"filled up\". She will be wearing for a total of 28 days.   She does monitor her BP, does not have readings available during this call.   Jayne denies any needs, questions, or concerns at this time.     Medication Review:  Medications changed since last call, reviewed today.     Assessments:  Care Transitions Subsequent and Final Call    Subsequent and Final Calls  Do you have any ongoing symptoms?: No  Have your medications changed?: Yes  Patient Reports: Coreg stopped  Do you have any questions related to your medications?: No  Do you currently have any active services?: No  Do you have any needs or concerns that I can assist you with?: No  Identified Barriers: None  Care Transitions Interventions  No Identified Needs  Other Interventions:              Follow Up Appointment:   JAVI appointment attended as scheduled   Future Appointments         Provider Specialty Dept Phone    2025 10:00 AM MARSHALL HUNT [facility] 1 Cardiology [phone]    2025 11:00 AM Sara Qureshi PA Cardiology [phone]    2025 2:00 PM Chema Norman MD Cardiology [phone]    3/17/2025 1:00 PM Navdeep Rankin DO Primary Care [phone]    2025 1:00 PM

## 2025-01-30 ENCOUNTER — CARE COORDINATION (OUTPATIENT)
Dept: CARE COORDINATION | Age: 85
End: 2025-01-30

## 2025-01-30 ENCOUNTER — TELEPHONE (OUTPATIENT)
Age: 85
End: 2025-01-30

## 2025-01-30 NOTE — CARE COORDINATION
Care Transitions Note    Follow Up Call     Patient Current Location:  Home: [address]  [address]  [place] [address]    Care Transition Nurse contacted the patient by telephone. Verified name and  as identifiers.    Additional needs identified to be addressed with provider   No needs identified             Method of communication with provider: none.    Care Summary Note: Spoke with Jayne for follow up care transition call. She reports that she is feeling \"pretty good\" still but continues to feel \"foggy\", like she's had too much sleep or not enough sleep. She continues to deny lightheadedness after stopping the Coreg as instructed last week.   She continues to wear the Zio monitor and reports that she was sent a 4th monitor Monday because hers \"are \"filling up\". She will be wearing for a total of 28 days.   She does monitor her BP and HR, stating they have been \"good\" but she hasn't been writing them down. CTN encouraged her to start writing them down in preparation for her appt with Sara (cardio) 25, she plans to do so.   She denies any concerns with her BL groin sites.   Jayne denies any needs, questions, or concerns at this time.   Assessments:  Care Transitions Subsequent and Final Call    Subsequent and Final Calls  Do you have any ongoing symptoms?: No  Have your medications changed?: No  Do you have any questions related to your medications?: No  Do you currently have any active services?: No  Do you have any needs or concerns that I can assist you with?: No  Identified Barriers: None  Care Transitions Interventions  No Identified Needs  Other Interventions:              Follow Up Appointment:   Reviewed upcoming appointment(s).  Future Appointments         Provider Specialty Dept Phone    2025 10:00 AM [facility] Cardiology [phone]    2025 11:00 AM Sara Qureshi PA Cardiology [phone]    2025 2:00 PM Chema Norman MD Cardiology [phone]    3/17/2025 1:00 PM

## 2025-02-04 ENCOUNTER — OFFICE VISIT (OUTPATIENT)
Dept: CARDIOLOGY CLINIC | Age: 85
End: 2025-02-04
Payer: MEDICARE

## 2025-02-04 ENCOUNTER — HOSPITAL ENCOUNTER (OUTPATIENT)
Dept: CARDIOLOGY | Age: 85
Discharge: HOME | End: 2025-02-06
Payer: MEDICARE

## 2025-02-04 VITALS
DIASTOLIC BLOOD PRESSURE: 70 MMHG | RESPIRATION RATE: 16 BRPM | BODY MASS INDEX: 38.53 KG/M2 | SYSTOLIC BLOOD PRESSURE: 150 MMHG | WEIGHT: 209.4 LBS | HEART RATE: 96 BPM | HEIGHT: 62 IN

## 2025-02-04 DIAGNOSIS — Z95.2 HISTORY OF TRANSCATHETER AORTIC VALVE REPLACEMENT (TAVR): ICD-10-CM

## 2025-02-04 DIAGNOSIS — Z95.2 HISTORY OF TRANSCATHETER AORTIC VALVE REPLACEMENT (TAVR): Primary | ICD-10-CM

## 2025-02-04 DIAGNOSIS — I35.0 NONRHEUMATIC AORTIC VALVE STENOSIS: Primary | ICD-10-CM

## 2025-02-04 LAB
ECHO AO ASC DIAM: 3 CM
ECHO AV ACCELERATION TIME: 90.73 MS
ECHO AV AREA PEAK VELOCITY: 1.6 CM2
ECHO AV AREA VTI: 1.7 CM2
ECHO AV MEAN GRADIENT: 11 MMHG
ECHO AV MEAN VELOCITY: 1.5 M/S
ECHO AV PEAK GRADIENT: 22 MMHG
ECHO AV PEAK VELOCITY: 2.4 M/S
ECHO AV VELOCITY RATIO: 0.5
ECHO AV VTI: 47.4 CM
ECHO EST RA PRESSURE: 3 MMHG
ECHO LA DIAMETER: 4.2 CM
ECHO LA VOL A-L A2C: 71 ML (ref 22–52)
ECHO LA VOL A-L A4C: 61 ML (ref 22–52)
ECHO LA VOL MOD A2C: 67 ML (ref 22–52)
ECHO LA VOL MOD A4C: 58 ML (ref 22–52)
ECHO LA VOLUME AREA LENGTH: 66 ML
ECHO LV E' LATERAL VELOCITY: 6 CM/S
ECHO LV E' SEPTAL VELOCITY: 7 CM/S
ECHO LV EDV A2C: 52 ML
ECHO LV EDV A4C: 67 ML
ECHO LV EDV BP: 59 ML (ref 56–104)
ECHO LV EJECTION FRACTION A2C: 57 %
ECHO LV EJECTION FRACTION A4C: 64 %
ECHO LV EJECTION FRACTION BIPLANE: 60 % (ref 55–100)
ECHO LV ESV A2C: 22 ML
ECHO LV ESV A4C: 24 ML
ECHO LV ESV BP: 24 ML (ref 19–49)
ECHO LV FRACTIONAL SHORTENING: 27 % (ref 28–44)
ECHO LV INTERNAL DIMENSION DIASTOLIC: 4.5 CM (ref 3.9–5.3)
ECHO LV INTERNAL DIMENSION SYSTOLIC: 3.3 CM
ECHO LV IVSD: 1 CM (ref 0.6–0.9)
ECHO LV IVSS: 1.3 CM
ECHO LV MASS 2D: 153.3 G (ref 67–162)
ECHO LV POSTERIOR WALL DIASTOLIC: 1 CM (ref 0.6–0.9)
ECHO LV POSTERIOR WALL SYSTOLIC: 1.4 CM
ECHO LV RELATIVE WALL THICKNESS RATIO: 0.44
ECHO LVOT AREA: 3.1 CM2
ECHO LVOT AV VTI INDEX: 0.54
ECHO LVOT DIAM: 2 CM
ECHO LVOT MEAN GRADIENT: 3 MMHG
ECHO LVOT PEAK GRADIENT: 6 MMHG
ECHO LVOT PEAK VELOCITY: 1.2 M/S
ECHO LVOT SV: 81 ML
ECHO LVOT VTI: 25.8 CM
ECHO MV A VELOCITY: 0
ECHO MV AREA PHT: 3.2 CM2
ECHO MV AREA VTI: 2.6 CM2
ECHO MV E DECELERATION TIME (DT): 176.2 MS
ECHO MV E VELOCITY: 1.31 M/S
ECHO MV E/E' LATERAL: 21.83
ECHO MV E/E' RATIO (AVERAGED): 20.27
ECHO MV E/E' SEPTAL: 18.71
ECHO MV EROA PISA: 0.1 CM2
ECHO MV LVOT VTI INDEX: 1.19
ECHO MV MAX VELOCITY: 1.4 M/S
ECHO MV MEAN GRADIENT: 3 MMHG
ECHO MV MEAN VELOCITY: 0.8 M/S
ECHO MV PEAK GRADIENT: 7 MMHG
ECHO MV PRESSURE HALF TIME (PHT): 67.9 MS
ECHO MV REGURGITANT ALIASING (NYQUIST) VELOCITY: 32 CM/S
ECHO MV REGURGITANT RADIUS PISA: 0.61 CM
ECHO MV REGURGITANT VELOCITY PISA: 5.5 M/S
ECHO MV REGURGITANT VOLUME PISA: 25.34 ML
ECHO MV REGURGITANT VTIA: 186.4 CM
ECHO MV VTI: 30.7 CM
ECHO PV MAX VELOCITY: 0.9 M/S
ECHO PV MEAN GRADIENT: 2 MMHG
ECHO PV MEAN VELOCITY: 0.6 M/S
ECHO PV PEAK GRADIENT: 3 MMHG
ECHO PV VTI: 19.9 CM
ECHO PVEIN A VELOCITY: 0.5 M/S
ECHO PVEIN PEAK D VELOCITY: 0.5 M/S
ECHO RIGHT VENTRICULAR SYSTOLIC PRESSURE (RVSP): 43 MMHG
ECHO RV INTERNAL DIMENSION: 3.8 CM
ECHO RV TAPSE: 2.5 CM (ref 1.7–?)
ECHO TV REGURGITANT MAX VELOCITY: 3.18 M/S
ECHO TV REGURGITANT PEAK GRADIENT: 41 MMHG

## 2025-02-04 PROCEDURE — 93306 TTE W/DOPPLER COMPLETE: CPT

## 2025-02-04 PROCEDURE — 1126F AMNT PAIN NOTED NONE PRSNT: CPT | Performed by: PHYSICIAN ASSISTANT

## 2025-02-04 PROCEDURE — 3077F SYST BP >= 140 MM HG: CPT | Performed by: PHYSICIAN ASSISTANT

## 2025-02-04 PROCEDURE — 1159F MED LIST DOCD IN RCRD: CPT | Performed by: PHYSICIAN ASSISTANT

## 2025-02-04 PROCEDURE — 1123F ACP DISCUSS/DSCN MKR DOCD: CPT | Performed by: PHYSICIAN ASSISTANT

## 2025-02-04 PROCEDURE — 3078F DIAST BP <80 MM HG: CPT | Performed by: PHYSICIAN ASSISTANT

## 2025-02-04 PROCEDURE — 99213 OFFICE O/P EST LOW 20 MIN: CPT | Performed by: PHYSICIAN ASSISTANT

## 2025-02-04 PROCEDURE — 93306 TTE W/DOPPLER COMPLETE: CPT | Performed by: INTERNAL MEDICINE

## 2025-02-04 ASSESSMENT — KANSAS CITY CARDIOMYOPATHY QUESTIONNAIRE (KCCQ12)
8A. OVER THE PAST 2 WEEKS, ON AVERAGE, HOW HAS HEART FAILURE LIMITED YOU ABILITY TO DO HOBBIES OR RECREATIONAL ACTIVITIES: DID NOT LIMIT AT ALL
8B. OVER THE PAST 2 WEEKS, ON AVERAGE, HOW HAS HEART FAILURE LIMITED YOU ABILITY TO WORK OR DO HOUSEHOLD CHORES: DID NOT LIMIT AT ALL
1A. OVER THE PAST 2 WEEKS, HOW MUCH WERE YOU LIMITED BY HEART FAILURE SYMPTOMS (SHORTNESS OF BREATH OR FATIGUE) WHEN SHOWERING OR BATHING: NOT AT ALL LIMITED
3. OVER THE PAST 2 WEEKS, ON AVERAGE, HOW MANY TIMES HAS FATIGUE LIMITED YOUR ABILITY TO DO WHAT YOU WANTED: NEVER OVER THE PAST 2 WEEKS
4. OVER THE PAST 2 WEEKS, ON AVERAGE, HOW MANY TIMES HAS SHORTNESS OF BREATH LIMITED YOUR ABILITY TO DO WHAT YOU WANTED: NEVER OVER THE PAST 2 WEEKS
2. OVER THE PAST 2 WEEKS, HOW MANY TIMES DID YOU HAVE SWELLING IN YOUR FEET, ANKLES OR LEGS WHEN YOU WOKE UP IN THE MORNING: NEVER OVER THE PAST 2 WEEKS
6. OVER THE PAST 2 WEEKS, HOW MUCH HAS YOUR HEART FAILURE LIMITED YOUR ENJOYMENT OF LIFE: IT HAS NOT LIMITED MY ENJOYMENT OF LIFE AT ALL
7. IF YOU HAD TO SPEND THE REST OF YOUR LIFE WITH YOUR HEART FAILURE THE WAY IT IS RIGHT NOW, HOW WOULD YOU FEEL ABOUT THIS?: COMPLETELY SATISFIED
8C. OVER THE PAST 2 WEEKS, ON AVERAGE, HOW HAS HEART FAILURE LIMITED YOU ABILITY TO VISIT FAMILY AND FRIENDS OUR OF YOUR HOME: DID NOT LIMIT AT ALL
1B. OVER THE PAST 2 WEEKS, HOW MUCH WERE YOU LIMITED BY HEART FAILURE SYMPTOMS (SHORTNESS OF BREATH OR FATIGUE) WHEN WALKING 1 BLOCK ON LEVEL GROUND: MODERATELY LIMITED
1C. OVER THE PAST 2 WEEKS, HOW MUCH WERE YOU LIMITED BY HEART FAILURE SYMPTOMS (SHORTNESS OF BREATH OR FATIGUE) WHEN HURRYING OR JOGGING (AS IF TO CATCH A BUS): QUITE A BIT LIMITED
5. OVER THE PAST 2 WEEKS, ON AVERAGE, HOW MANY TIMES HAVE YOU BEEN FORCED TO SLEEP SITTING UP IN A CHAIR OR WITH AT LEAST 3 PILLOWS TO PROP YOU UP BECAUSE OF SHORTNESS OF BREATH?: NEVER OVER THE PAST 2 WEEKS

## 2025-02-04 NOTE — PROGRESS NOTES
[facility]  Visit Note      Patient name: Jayne Weaver    Reason for visit: TAVR follow up    Referring Physician: Dr. Norman    Primary Care Physician: Navdeep Rankin DO    Date of service: 2/4/2025    Chief Complaint: TAVR follow up - 30 day    HPI: Mrs. Weaver presents for follow up s/p TAVR on 1/8/25. She is doing well since last visit. She denies chest pain, sob/gillespie, orthopnea, PND, LE edema, palpitations or syncope. Lightheadedness has resolved after stopping coreg, but brief vision changes have persisted. She has blurred vision lasting a period of seconds before going back to normal. SBP has remained well controlled at home, although she notes using a wrist cuff. Mildly elevated in clinic today with manual brachial cuff.     Allergies: No Known Allergies    Home medications:    Current Outpatient Medications   Medication Sig Dispense Refill    Budeson-Glycopyrrol-Formoterol (BREZTRI AEROSPHERE) 160-9-4.8 MCG/ACT AERO Inhale 2 puffs into the lungs in the morning and at bedtime 10.7 g 5    pantoprazole (PROTONIX) 40 MG tablet Take 1 tablet by mouth daily 90 tablet 1    rosuvastatin (CRESTOR) 5 MG tablet Take 1 tablet by mouth daily 90 tablet 1    apixaban (ELIQUIS) 5 MG TABS tablet Take 1 tablet by mouth 2 times daily 180 tablet 1    Multiple Vitamins-Minerals (CENTRUM PO) Take by mouth daily      albuterol sulfate HFA (VENTOLIN HFA) 108 (90 Base) MCG/ACT inhaler Inhale 2 puffs into the lungs every 6 hours as needed for Wheezing (Patient not taking: Reported on 2/4/2025)      acetaminophen (TYLENOL) 500 MG tablet Take 2 tablets by mouth every 6 hours as needed for Pain (Patient not taking: Reported on 1/21/2025)      carvedilol (COREG) 3.125 MG tablet Take 1 tablet by mouth 2 times daily (with meals) (Patient not taking: Reported on 2/4/2025) 60 tablet 2     No current facility-administered medications for this visit.       Past Medical History:  Past Medical History:   Diagnosis Date

## 2025-02-06 ENCOUNTER — TELEPHONE (OUTPATIENT)
Dept: PRIMARY CARE CLINIC | Age: 85
End: 2025-02-06

## 2025-02-06 ENCOUNTER — OFFICE VISIT (OUTPATIENT)
Dept: CARDIOLOGY CLINIC | Age: 85
End: 2025-02-06
Payer: MEDICARE

## 2025-02-06 ENCOUNTER — CARE COORDINATION (OUTPATIENT)
Dept: CARE COORDINATION | Age: 85
End: 2025-02-06

## 2025-02-06 VITALS
TEMPERATURE: 97.8 F | HEIGHT: 61 IN | DIASTOLIC BLOOD PRESSURE: 84 MMHG | SYSTOLIC BLOOD PRESSURE: 136 MMHG | RESPIRATION RATE: 18 BRPM | WEIGHT: 210 LBS | BODY MASS INDEX: 39.65 KG/M2 | HEART RATE: 91 BPM

## 2025-02-06 DIAGNOSIS — I10 PRIMARY HYPERTENSION: ICD-10-CM

## 2025-02-06 DIAGNOSIS — E55.9 VITAMIN D DEFICIENCY: ICD-10-CM

## 2025-02-06 DIAGNOSIS — E78.2 MIXED HYPERLIPIDEMIA: ICD-10-CM

## 2025-02-06 DIAGNOSIS — Z95.2 HISTORY OF TRANSCATHETER AORTIC VALVE REPLACEMENT (TAVR): ICD-10-CM

## 2025-02-06 DIAGNOSIS — I48.19 PERSISTENT ATRIAL FIBRILLATION (HCC): Primary | ICD-10-CM

## 2025-02-06 DIAGNOSIS — I10 PRIMARY HYPERTENSION: Primary | ICD-10-CM

## 2025-02-06 DIAGNOSIS — Z79.01 CHRONIC ANTICOAGULATION: ICD-10-CM

## 2025-02-06 PROCEDURE — 99214 OFFICE O/P EST MOD 30 MIN: CPT | Performed by: INTERNAL MEDICINE

## 2025-02-06 PROCEDURE — 93000 ELECTROCARDIOGRAM COMPLETE: CPT | Performed by: INTERNAL MEDICINE

## 2025-02-06 PROCEDURE — 1160F RVW MEDS BY RX/DR IN RCRD: CPT | Performed by: INTERNAL MEDICINE

## 2025-02-06 PROCEDURE — 1123F ACP DISCUSS/DSCN MKR DOCD: CPT | Performed by: INTERNAL MEDICINE

## 2025-02-06 PROCEDURE — 3075F SYST BP GE 130 - 139MM HG: CPT | Performed by: INTERNAL MEDICINE

## 2025-02-06 PROCEDURE — 1159F MED LIST DOCD IN RCRD: CPT | Performed by: INTERNAL MEDICINE

## 2025-02-06 PROCEDURE — 3079F DIAST BP 80-89 MM HG: CPT | Performed by: INTERNAL MEDICINE

## 2025-02-06 NOTE — CARE COORDINATION
Care Transitions Note    Final Call     Patient Current Location:  Home: [address]  [address]  [place] [address]    Care Transition Nurse contacted the patient by telephone. Verified name and  as identifiers.    Patient graduated from the Care Transitions program on 25.  Patient/family verbalizes confidence in the ability to self-manage at this time. has the ability to self manage at this time..      Advance Care Planning:   Does patient have an Advance Directive: health care decision maker confirmed.    Handoff:   Patient was not referred to the [facility] team due to patient declined services.      Care Summary Note: Spoke with Jayne for final care transition call. She reports feeling \"fantastic\" today. She reports following up with Dr. Norman today who thought she was doing well also and told her to come back in 6 months. She continues to deny lightheadedness. Does continue to have a foggy feeling, is following up with her eye doctor tomorrow to make sure it isn't stemming from her vision.  She does monitor her BP and HR, stating they have been \"good\" but doesn't have the readings nearby.   She denies any concerns with her BL groin sites.   Jayne denies any needs, questions, or concerns at this time.     Assessments:  Care Transitions Subsequent and Final Call    Subsequent and Final Calls  Do you have any ongoing symptoms?: No  Have your medications changed?: No  Do you have any questions related to your medications?: No  Do you currently have any active services?: No  Do you have any needs or concerns that I can assist you with?: No  Identified Barriers: None  Care Transitions Interventions  No Identified Needs  Other Interventions:              Upcoming Appointments:    Future Appointments         Provider Specialty Dept Phone    3/17/2025 1:00 PM Navdeep Rankin DO Primary Care [phone]    2025 1:00 PM Navdeep Rankin DO Primary Care [phone]    2025 1:00 PM Chema Norman MD Cardiology

## 2025-02-06 NOTE — TELEPHONE ENCOUNTER
----- Message from Jazmine RAMSES sent at 2/6/2025 10:14 AM EST -----  Regarding: ECC Message to Provider  ECC Message to Provider    Relationship to Patient: Self     Additional Information: Patient wants to know if she needs to do a blood work before her upcoming office visit with Dr. Rankin on 3/17/2025 at 1:00 pm.  --------------------------------------------------------------------------------------------------------------------------    Call Back Information: OK to leave message on voicemail  Preferred Call Back Number: Phone [phone]   Population Health New Social Work Referral     note:   received referral from RN.  Per RN referral assessment below, the following needs were identified and referred to  for follow up.    Per RN, patient is in need of additional food resources, he already has food share.      will reach out to patient in: 5-7 days.     REBEKAH Roche, APSW

## 2025-02-06 NOTE — PROGRESS NOTES
hematemesis or recent changes in bowel habits  Genitourinary: No hematuria, urgency or frequency  Musculoskeletal: No muscular weakness or joint swelling   Neurologic / Psychiatric: No incoordination or convulsions  Allergic / Immunologic/ Lymphatic / Endocrine: No anemia or bleeding tendency    Social History     Socioeconomic History    Marital status:      Spouse name: Not on file    Number of children: Not on file    Years of education: Not on file    Highest education level: Not on file   Occupational History    Occupation: retired- [employer]   Tobacco Use    Smoking status: Never    Smokeless tobacco: Never   Vaping Use    Vaping status: Never Used   Substance and Sexual Activity    Alcohol use: No    Drug use: No    Sexual activity: Not Currently   Other Topics Concern    Not on file   Social History Narrative    Not on file     Social Determinants of Health     Financial Resource Strain: Low Risk  (3/7/2024)    Overall Financial Resource Strain (CARDIA)     Difficulty of Paying Living Expenses: Not hard at all   Food Insecurity: No Food Insecurity (6/22/2024)    Hunger Vital Sign     Worried About Running Out of Food in the Last Year: Never true     Ran Out of Food in the Last Year: Never true   Transportation Needs: No Transportation Needs (6/22/2024)    PRAPARE - Transportation     Lack of Transportation (Medical): No     Lack of Transportation (Non-Medical): No   Physical Activity: Inactive (3/7/2024)    Exercise Vital Sign     Days of Exercise per Week: 0 days     Minutes of Exercise per Session: 0 min   Stress: Not on file   Social Connections: Not on file   Intimate Partner Violence: Not on file   Housing Stability: Low Risk  (6/22/2024)    Housing Stability Vital Sign     Unable to Pay for Housing in the Last Year: No     Number of Places Lived in the Last Year: 1     Unstable Housing in the Last Year: No       Family History   Problem Relation Age of Onset

## 2025-02-11 ENCOUNTER — OFFICE VISIT (OUTPATIENT)
Dept: PRIMARY CARE CLINIC | Age: 85
End: 2025-02-11

## 2025-02-11 VITALS
HEIGHT: 61 IN | BODY MASS INDEX: 39.65 KG/M2 | RESPIRATION RATE: 16 BRPM | SYSTOLIC BLOOD PRESSURE: 130 MMHG | HEART RATE: 109 BPM | DIASTOLIC BLOOD PRESSURE: 68 MMHG | OXYGEN SATURATION: 92 % | TEMPERATURE: 97.1 F | WEIGHT: 210 LBS

## 2025-02-11 DIAGNOSIS — J01.90 ACUTE BACTERIAL SINUSITIS: Primary | ICD-10-CM

## 2025-02-11 DIAGNOSIS — B96.89 ACUTE BACTERIAL SINUSITIS: Primary | ICD-10-CM

## 2025-02-11 RX ORDER — BENZONATATE 200 MG/1
200 CAPSULE ORAL 3 TIMES DAILY PRN
Qty: 30 CAPSULE | Refills: 0 | Status: SHIPPED | OUTPATIENT
Start: 2025-02-11 | End: 2025-02-21

## 2025-02-11 RX ORDER — AMOXICILLIN 875 MG/1
875 TABLET, COATED ORAL 2 TIMES DAILY
Qty: 20 TABLET | Refills: 0 | Status: SHIPPED | OUTPATIENT
Start: 2025-02-11 | End: 2025-02-21

## 2025-02-11 ASSESSMENT — ENCOUNTER SYMPTOMS
EYE ITCHING: 0
SHORTNESS OF BREATH: 0
VOMITING: 0
EYE REDNESS: 0
EYE PAIN: 0
ABDOMINAL PAIN: 0
NAUSEA: 0
RHINORRHEA: 1
WHEEZING: 0
APNEA: 0
COUGH: 1
SORE THROAT: 0
BACK PAIN: 0
COLOR CHANGE: 0
DIARRHEA: 0
BLOOD IN STOOL: 0
SINUS PRESSURE: 0
CHEST TIGHTNESS: 0
CONSTIPATION: 0

## 2025-02-11 NOTE — PROGRESS NOTES
Mental Status: She is alert and oriented to person, place, and time.      Cranial Nerves: No cranial nerve deficit.      Deep Tendon Reflexes: Reflexes are normal and symmetric. Reflexes normal.   Psychiatric:         Mood and Affect: Mood normal.                                 ASSESSMENT/PLAN:    Patient Active Problem List   Diagnosis    Primary hypertension    Mixed hyperlipidemia    Fatigue    Chronic obstructive pulmonary disease, unspecified COPD type (HCC)    Seasonal allergies    Vitamin D deficiency    Valvular heart disease    Dysphagia    Nonrheumatic aortic valve stenosis    Persistent atrial fibrillation (HCC)    Chronic anticoagulation    Nodular calcific aortic valve stenosis    S/P heart valve repair    Left bundle branch block       Acute bacterial sinusitis      Orders Placed This Encounter    amoxicillin (AMOXIL) 875 MG tablet     Sig: Take 1 tablet by mouth 2 times daily for 10 days     Dispense:  20 tablet     Refill:  0    benzonatate (TESSALON) 200 MG capsule     Sig: Take 1 capsule by mouth 3 times daily as needed for Cough     Dispense:  30 capsule     Refill:  0        Return if symptoms worsen or fail to improve.    I spent 20 minutes with this patient providing this service today, including time spent seeing the patient as well as documentation and chart review, excluding any separately billed procedures.     Navdeep Rankin DO  2/11/2025  11:16 AM

## 2025-02-12 DIAGNOSIS — I44.7 LEFT BUNDLE BRANCH BLOCK: ICD-10-CM

## 2025-02-12 DIAGNOSIS — Z95.2 HISTORY OF TRANSCATHETER AORTIC VALVE REPLACEMENT (TAVR): ICD-10-CM

## 2025-03-11 ENCOUNTER — CLINICAL DOCUMENTATION (OUTPATIENT)
Dept: CARDIOLOGY | Age: 85
End: 2025-03-11

## 2025-03-11 DIAGNOSIS — E78.2 MIXED HYPERLIPIDEMIA: ICD-10-CM

## 2025-03-11 DIAGNOSIS — E55.9 VITAMIN D DEFICIENCY: ICD-10-CM

## 2025-03-11 DIAGNOSIS — I10 PRIMARY HYPERTENSION: ICD-10-CM

## 2025-03-11 LAB
ALBUMIN: 3.9 G/DL (ref 3.5–5.2)
ALP BLD-CCNC: 72 U/L (ref 35–104)
ALT SERPL-CCNC: 7 U/L (ref 0–32)
ANION GAP SERPL CALCULATED.3IONS-SCNC: 15 MMOL/L (ref 7–16)
AST SERPL-CCNC: 14 U/L (ref 0–31)
BILIRUB SERPL-MCNC: 0.5 MG/DL (ref 0–1.2)
BUN BLDV-MCNC: 15 MG/DL (ref 6–23)
CALCIUM SERPL-MCNC: 9.6 MG/DL (ref 8.6–10.2)
CHLORIDE BLD-SCNC: 102 MMOL/L (ref 98–107)
CHOLESTEROL, TOTAL: 138 MG/DL
CO2: 25 MMOL/L (ref 22–29)
CREAT SERPL-MCNC: 0.8 MG/DL (ref 0.5–1)
GFR, ESTIMATED: 79 ML/MIN/1.73M2
GLUCOSE BLD-MCNC: 105 MG/DL (ref 74–99)
HCT VFR BLD CALC: 44.1 % (ref 34–48)
HDLC SERPL-MCNC: 35 MG/DL
HEMOGLOBIN: 13.7 G/DL (ref 11.5–15.5)
LDL CHOLESTEROL: 81 MG/DL
MCH RBC QN AUTO: 31.9 PG (ref 26–35)
MCHC RBC AUTO-ENTMCNC: 31.1 G/DL (ref 32–34.5)
MCV RBC AUTO: 102.6 FL (ref 80–99.9)
PDW BLD-RTO: 13.1 % (ref 11.5–15)
PLATELET # BLD: 221 K/UL (ref 130–450)
PMV BLD AUTO: 11.2 FL (ref 7–12)
POTASSIUM SERPL-SCNC: 5.1 MMOL/L (ref 3.5–5)
RBC # BLD: 4.3 M/UL (ref 3.5–5.5)
SODIUM BLD-SCNC: 142 MMOL/L (ref 132–146)
TOTAL PROTEIN: 6.8 G/DL (ref 6.4–8.3)
TRIGL SERPL-MCNC: 109 MG/DL
TSH SERPL DL<=0.05 MIU/L-ACNC: 2.8 UIU/ML (ref 0.27–4.2)
VITAMIN D 25-HYDROXY: 32.7 NG/ML (ref 30–100)
VLDLC SERPL CALC-MCNC: 22 MG/DL
WBC # BLD: 10.1 K/UL (ref 4.5–11.5)

## 2025-03-11 NOTE — PROGRESS NOTES
Patient called with BP readings since resuming coreg 3/125 mg BID two weeks ago. Still ranging 140s-150s with HR 80s-90s. Advised to increase to 6.25 mg BID and follow up at scheduled visit with PCP on 3/17/25. She states understanding.     Also relates some issues with dyspnea over the last few days that had been improved since TAVR. States she had been out of her albuterol inhaler but did get it refilled yesterday. States her breathing is better today.

## 2025-03-17 ENCOUNTER — OFFICE VISIT (OUTPATIENT)
Dept: PRIMARY CARE CLINIC | Age: 85
End: 2025-03-17
Payer: MEDICARE

## 2025-03-17 VITALS
RESPIRATION RATE: 16 BRPM | HEIGHT: 61 IN | OXYGEN SATURATION: 94 % | DIASTOLIC BLOOD PRESSURE: 74 MMHG | WEIGHT: 209 LBS | HEART RATE: 75 BPM | SYSTOLIC BLOOD PRESSURE: 138 MMHG | TEMPERATURE: 97.1 F | BODY MASS INDEX: 39.46 KG/M2

## 2025-03-17 DIAGNOSIS — Z98.890 S/P HEART VALVE REPAIR: ICD-10-CM

## 2025-03-17 DIAGNOSIS — E55.9 VITAMIN D DEFICIENCY: ICD-10-CM

## 2025-03-17 DIAGNOSIS — I48.19 PERSISTENT ATRIAL FIBRILLATION (HCC): ICD-10-CM

## 2025-03-17 DIAGNOSIS — J44.9 MODERATE COPD (CHRONIC OBSTRUCTIVE PULMONARY DISEASE) (HCC): ICD-10-CM

## 2025-03-17 DIAGNOSIS — E78.2 MIXED HYPERLIPIDEMIA: ICD-10-CM

## 2025-03-17 DIAGNOSIS — Z00.00 MEDICARE ANNUAL WELLNESS VISIT, SUBSEQUENT: Primary | ICD-10-CM

## 2025-03-17 DIAGNOSIS — R53.83 FATIGUE, UNSPECIFIED TYPE: ICD-10-CM

## 2025-03-17 DIAGNOSIS — I10 PRIMARY HYPERTENSION: ICD-10-CM

## 2025-03-17 DIAGNOSIS — I38 VALVULAR HEART DISEASE: ICD-10-CM

## 2025-03-17 PROCEDURE — 3078F DIAST BP <80 MM HG: CPT | Performed by: FAMILY MEDICINE

## 2025-03-17 PROCEDURE — 1160F RVW MEDS BY RX/DR IN RCRD: CPT | Performed by: FAMILY MEDICINE

## 2025-03-17 PROCEDURE — 1123F ACP DISCUSS/DSCN MKR DOCD: CPT | Performed by: FAMILY MEDICINE

## 2025-03-17 PROCEDURE — 1159F MED LIST DOCD IN RCRD: CPT | Performed by: FAMILY MEDICINE

## 2025-03-17 PROCEDURE — G0439 PPPS, SUBSEQ VISIT: HCPCS | Performed by: FAMILY MEDICINE

## 2025-03-17 PROCEDURE — 3075F SYST BP GE 130 - 139MM HG: CPT | Performed by: FAMILY MEDICINE

## 2025-03-17 SDOH — ECONOMIC STABILITY: FOOD INSECURITY: WITHIN THE PAST 12 MONTHS, YOU WORRIED THAT YOUR FOOD WOULD RUN OUT BEFORE YOU GOT MONEY TO BUY MORE.: NEVER TRUE

## 2025-03-17 SDOH — ECONOMIC STABILITY: FOOD INSECURITY: WITHIN THE PAST 12 MONTHS, THE FOOD YOU BOUGHT JUST DIDN'T LAST AND YOU DIDN'T HAVE MONEY TO GET MORE.: NEVER TRUE

## 2025-03-17 ASSESSMENT — ENCOUNTER SYMPTOMS
SINUS PRESSURE: 0
BLOOD IN STOOL: 0
SPUTUM PRODUCTION: 0
COLOR CHANGE: 0
SHORTNESS OF BREATH: 0
DIARRHEA: 0
BACK PAIN: 0
ABDOMINAL PAIN: 0
EYE ITCHING: 0
DIFFICULTY BREATHING: 0
RHINORRHEA: 0
COUGH: 0
CONSTIPATION: 0
APNEA: 0
SORE THROAT: 0
CHEST TIGHTNESS: 0
EYE REDNESS: 0
WHEEZING: 0
NAUSEA: 0
EYE PAIN: 0
VOMITING: 0

## 2025-03-17 ASSESSMENT — PATIENT HEALTH QUESTIONNAIRE - PHQ9
SUM OF ALL RESPONSES TO PHQ QUESTIONS 1-9: 0
SUM OF ALL RESPONSES TO PHQ QUESTIONS 1-9: 0
1. LITTLE INTEREST OR PLEASURE IN DOING THINGS: NOT AT ALL
2. FEELING DOWN, DEPRESSED OR HOPELESS: NOT AT ALL
SUM OF ALL RESPONSES TO PHQ QUESTIONS 1-9: 0
SUM OF ALL RESPONSES TO PHQ QUESTIONS 1-9: 0

## 2025-03-17 ASSESSMENT — COPD QUESTIONNAIRES: COPD: 1

## 2025-03-17 NOTE — PATIENT INSTRUCTIONS
have any problems.  Where can you learn more?  Go to https://www.healthpickrset.net/patientEd and enter F075 to learn more about \"A Healthy Heart: Care Instructions.\"  Current as of: July 31, 2024  Content Version: 14.4  © 0709-9420 [employer].   Care instructions adapted under license by [employer]. If you have questions about a medical condition or this instruction, always ask your healthcare professional. [employer] disclaims any warranty or liability for your use of this information.    Personalized Preventive Plan for Jayne Weaver - 3/17/2025  Medicare offers a range of preventive health benefits. Some of the tests and screenings are paid in full while other may be subject to a deductible, co-insurance, and/or copay.  Some of these benefits include a comprehensive review of your medical history including lifestyle, illnesses that may run in your family, and various assessments and screenings as appropriate.  After reviewing your medical record and screening and assessments performed today your provider may have ordered immunizations, labs, imaging, and/or referrals for you.  A list of these orders (if applicable) as well as your Preventive Care list are included within your After Visit Summary for your review.

## 2025-03-17 NOTE — PROGRESS NOTES
Chief Complaint:     Chief Complaint   Patient presents with    Medicare AWV    Hypertension    Hyperlipidemia    COPD    Heart Problem         Hypertension  This is a chronic problem. The current episode started more than 1 year ago. The problem is unchanged. The problem is controlled. Pertinent negatives include no chest pain, headaches, neck pain, palpitations or shortness of breath. There are no known risk factors for coronary artery disease. Past treatments include beta blockers. The current treatment provides significant improvement. There are no compliance problems.  There is no history of CAD/MI, CVA, heart failure, left ventricular hypertrophy or PVD. There is no history of a hypertension causing med, pheochromocytoma, renovascular disease, sleep apnea or a thyroid problem.   Hyperlipidemia  This is a chronic problem. The current episode started more than 1 year ago. Exacerbating diseases include obesity. Pertinent negatives include no chest pain, myalgias or shortness of breath. Current antihyperlipidemic treatment includes statins. There are no compliance problems.  Risk factors for coronary artery disease include dyslipidemia, hypertension, obesity and post-menopausal.   COPD  There is no cough, difficulty breathing, shortness of breath, sputum production or wheezing. This is a new problem. The current episode started more than 1 month ago. The problem occurs daily. The problem has been gradually improving. Associated symptoms include nasal congestion and postnasal drip. Pertinent negatives include no appetite change, chest pain, ear pain, fever, headaches, myalgias, rhinorrhea or sore throat. Her symptoms are aggravated by nothing. Her symptoms are alleviated by beta-agonist, steroid inhaler and ipratropium. She reports significant improvement on treatment. Risk factors for lung disease include occupational exposure and smoking/tobacco exposure. Her past medical history is significant for COPD.   Heart 
Provider, MD Duc   Budeson-Glycopyrrol-Formoterol (BREZTRI AEROSPHERE) 160-9-4.8 MCG/ACT AERO Inhale 2 puffs into the lungs in the morning and at bedtime Yes Navdeep Rankin DO   pantoprazole (PROTONIX) 40 MG tablet Take 1 tablet by mouth daily Yes Navdeep Rankin DO   rosuvastatin (CRESTOR) 5 MG tablet Take 1 tablet by mouth daily Yes Navdeep Rankin DO   acetaminophen (TYLENOL) 500 MG tablet Take 2 tablets by mouth every 6 hours as needed for Pain Yes ProviderDuc MD   apixaban (ELIQUIS) 5 MG TABS tablet Take 1 tablet by mouth 2 times daily Yes Navdeep Rankin DO   Multiple Vitamins-Minerals (CENTRUM PO) Take by mouth daily Yes Provider, Historical, MD       CareTeam (Including outside providers/suppliers regularly involved in providing care):   Patient Care Team:  Navdeep Rankin DO as PCP - General (Family Medicine)  Navdeep Rankin DO as PCP - Empaneled Provider  Zaki Lopez MD as Surgeon (General Surgery)  Chema Norman MD as Consulting Physician (Cardiology)     Recommendations for Preventive Services Due: see orders and patient instructions/AVS.  Recommended screening schedule for the next 5-10 years is provided to the patient in written form: see Patient Instructions/AVS.     Reviewed and updated this visit:  Tobacco  Allergies  Meds  Problems  Med Hx  Surg Hx  Fam Hx  Sexual   Hx

## 2025-03-26 RX ORDER — CARVEDILOL 3.12 MG/1
6.25 TABLET ORAL 2 TIMES DAILY WITH MEALS
Qty: 360 TABLET | Refills: 1 | Status: SHIPPED | OUTPATIENT
Start: 2025-03-26

## 2025-03-26 NOTE — TELEPHONE ENCOUNTER
Name of Medication(s) Requested:  Requested Prescriptions     Pending Prescriptions Disp Refills    apixaban (ELIQUIS) 5 MG TABS tablet 180 tablet 1     Sig: Take 1 tablet by mouth 2 times daily    carvedilol (COREG) 3.125 MG tablet 360 tablet 1     Sig: Take 2 tablets by mouth 2 times daily (with meals)       Medication is on current medication list Yes    Dosage and directions were verified? Yes    Quantity verified: 90 day supply     Pharmacy Verified?  Yes    Last Appointment:  3/17/2025    Future appts:  Future Appointments   Date Time Provider Department Center   3/26/2025  1:20 PM Rojas Garcia APRN - [facility]   8/7/2025  1:00 PM Chema Norman MD [facility]   8/26/2025  3:00 PM Rojas Garcia APRN - [facility]   9/18/2025  1:00 PM Navdeep Rankin DO [facility]   1/6/2026  9:00 AM [facility]SH[facility] H[facility]T [facility] 1 [facility]   1/6/2026 10:00 AM Sara Qureshi PA [facility]        (If no appt send self scheduling link. .REFILLAPPT)  Scheduling request sent?     [] Yes  [x] No    Does patient need updated?  [] Yes  [x] No

## 2025-04-14 ENCOUNTER — TELEPHONE (OUTPATIENT)
Dept: PRIMARY CARE CLINIC | Age: 85
End: 2025-04-14

## 2025-04-14 RX ORDER — AMOXICILLIN 500 MG/1
CAPSULE ORAL
Qty: 4 CAPSULE | Refills: 0 | Status: SHIPPED | OUTPATIENT
Start: 2025-04-14

## 2025-05-10 ENCOUNTER — HOSPITAL ENCOUNTER (OUTPATIENT)
Dept: CT IMAGING | Age: 85
Discharge: HOME | End: 2025-05-12
Payer: MEDICARE

## 2025-05-10 DIAGNOSIS — I35.0 NODULAR CALCIFIC AORTIC VALVE STENOSIS: ICD-10-CM

## 2025-05-10 DIAGNOSIS — J44.9 MODERATE COPD (CHRONIC OBSTRUCTIVE PULMONARY DISEASE) (HCC): ICD-10-CM

## 2025-05-10 DIAGNOSIS — R91.1 PULMONARY NODULE: ICD-10-CM

## 2025-05-10 PROCEDURE — 71250 CT THORAX DX C-: CPT

## 2025-06-02 ENCOUNTER — OFFICE VISIT (OUTPATIENT)
Dept: PRIMARY CARE CLINIC | Age: 85
End: 2025-06-02

## 2025-06-02 VITALS
TEMPERATURE: 97.4 F | OXYGEN SATURATION: 96 % | SYSTOLIC BLOOD PRESSURE: 126 MMHG | RESPIRATION RATE: 19 BRPM | BODY MASS INDEX: 39.46 KG/M2 | DIASTOLIC BLOOD PRESSURE: 80 MMHG | HEART RATE: 69 BPM | HEIGHT: 61 IN | WEIGHT: 209 LBS

## 2025-06-02 DIAGNOSIS — S51.811A SKIN TEAR OF RIGHT FOREARM WITHOUT COMPLICATION, INITIAL ENCOUNTER: Primary | ICD-10-CM

## 2025-06-02 RX ORDER — AMOXICILLIN 500 MG/1
CAPSULE ORAL
Qty: 4 CAPSULE | Refills: 0 | Status: SHIPPED | OUTPATIENT
Start: 2025-06-02

## 2025-06-02 ASSESSMENT — ENCOUNTER SYMPTOMS
SORE THROAT: 0
VOMITING: 0
EYE REDNESS: 0
APNEA: 0
WHEEZING: 0
SHORTNESS OF BREATH: 0
DIARRHEA: 0
NAUSEA: 0
CONSTIPATION: 0
BLOOD IN STOOL: 0
SINUS PRESSURE: 0
RHINORRHEA: 0
EYE ITCHING: 0
BACK PAIN: 0
NAIL CHANGES: 0
CHEST TIGHTNESS: 0
COUGH: 0
COLOR CHANGE: 0
ABDOMINAL PAIN: 0
EYE PAIN: 0

## 2025-06-02 NOTE — PROGRESS NOTES
Chief Complaint:     Chief Complaint   Patient presents with    Skin Problem     Skin tear on right arm caught on screen door, it won't stop bleeding.          Skin Problem  This is a new problem. The current episode started yesterday. The problem is unchanged. The affected locations include the right arm. Rash characteristics: skin tear, slightly oozing. She was exposed to nothing. Pertinent negatives include no anorexia, congestion, cough, diarrhea, eye pain, facial edema, fatigue, fever, joint pain, nail changes, rhinorrhea, shortness of breath, sore throat or vomiting. Past treatments include nothing. The treatment provided no relief.       Patient Active Problem List   Diagnosis    Primary hypertension    Mixed hyperlipidemia    Fatigue    Chronic obstructive pulmonary disease, unspecified COPD type (HCC)    Seasonal allergies    Vitamin D deficiency    Valvular heart disease    Dysphagia    Nonrheumatic aortic valve stenosis    Persistent atrial fibrillation (HCC)    Chronic anticoagulation    Nodular calcific aortic valve stenosis    S/P heart valve repair    Left bundle branch block       Past Medical History:   Diagnosis Date    AS (aortic stenosis)     COPD (chronic obstructive pulmonary disease) (HCC)     Hyperlipidemia     Hypertension     Paroxysmal A-fib (HCC)        Past Surgical History:   Procedure Laterality Date    BREAST SURGERY Right     lumpectomy    CARDIAC PROCEDURE N/A 10/21/2024    Left and right heart cath / coronary angiography performed by Chrales Morton MD at [facility] [place] [place] CARDIAC CATH LAB    CARDIAC PROCEDURE N/A 1/8/2025    TRANSCATHETER AORTIC VALVE REPLACEMENT FEMORAL APPROACH performed by Charles Mortno MD at [facility] [place] [place] OR    CARDIAC PROCEDURE N/A 1/8/2025    TRANSCATHETER AORTIC VALVE REPLACEMENT FEMORAL APPROACH performed by Mojgan Davidson DO at [facility] [place] [place]    CHOLECYSTECTOMY      COLONOSCOPY  02/20/2017    TUBAL LIGATION      UPPER GASTROINTESTINAL ENDOSCOPY N/A 6/26/2024

## 2025-06-18 ENCOUNTER — OFFICE VISIT (OUTPATIENT)
Dept: CARDIOLOGY CLINIC | Age: 85
End: 2025-06-18
Payer: MEDICARE

## 2025-06-18 VITALS
OXYGEN SATURATION: 93 % | BODY MASS INDEX: 38.61 KG/M2 | TEMPERATURE: 97.5 F | HEIGHT: 61 IN | HEART RATE: 61 BPM | DIASTOLIC BLOOD PRESSURE: 58 MMHG | RESPIRATION RATE: 18 BRPM | SYSTOLIC BLOOD PRESSURE: 122 MMHG | WEIGHT: 204.5 LBS

## 2025-06-18 DIAGNOSIS — I48.19 PERSISTENT ATRIAL FIBRILLATION (HCC): ICD-10-CM

## 2025-06-18 DIAGNOSIS — Z79.01 CHRONIC ANTICOAGULATION: ICD-10-CM

## 2025-06-18 DIAGNOSIS — Z95.2 HISTORY OF TRANSCATHETER AORTIC VALVE REPLACEMENT (TAVR): ICD-10-CM

## 2025-06-18 DIAGNOSIS — I10 PRIMARY HYPERTENSION: ICD-10-CM

## 2025-06-18 DIAGNOSIS — R06.02 SHORTNESS OF BREATH: ICD-10-CM

## 2025-06-18 DIAGNOSIS — R06.02 SHORTNESS OF BREATH: Primary | ICD-10-CM

## 2025-06-18 LAB — NT PRO BNP: 553 PG/ML (ref 0–450)

## 2025-06-18 PROCEDURE — 99214 OFFICE O/P EST MOD 30 MIN: CPT | Performed by: INTERNAL MEDICINE

## 2025-06-18 PROCEDURE — 1123F ACP DISCUSS/DSCN MKR DOCD: CPT | Performed by: INTERNAL MEDICINE

## 2025-06-18 PROCEDURE — 3074F SYST BP LT 130 MM HG: CPT | Performed by: INTERNAL MEDICINE

## 2025-06-18 PROCEDURE — 3078F DIAST BP <80 MM HG: CPT | Performed by: INTERNAL MEDICINE

## 2025-06-18 PROCEDURE — 93000 ELECTROCARDIOGRAM COMPLETE: CPT | Performed by: INTERNAL MEDICINE

## 2025-06-18 PROCEDURE — 1159F MED LIST DOCD IN RCRD: CPT | Performed by: INTERNAL MEDICINE

## 2025-06-18 RX ORDER — CETIRIZINE HYDROCHLORIDE 10 MG/1
10 TABLET ORAL DAILY
Status: ON HOLD | COMMUNITY

## 2025-06-18 NOTE — PROGRESS NOTES
Patient Active Problem List   Diagnosis    Primary hypertension    Mixed hyperlipidemia    Fatigue    Chronic obstructive pulmonary disease, unspecified COPD type (HCC)    Seasonal allergies    Vitamin D deficiency    Valvular heart disease    Dysphagia    Nonrheumatic aortic valve stenosis    Persistent atrial fibrillation (HCC)    Chronic anticoagulation    Nodular calcific aortic valve stenosis    S/P heart valve repair    Left bundle branch block       Current Outpatient Medications   Medication Sig Dispense Refill    cetirizine (ZYRTEC) 10 MG tablet Take 1 tablet by mouth daily      apixaban (ELIQUIS) 5 MG TABS tablet Take 1 tablet by mouth 2 times daily 180 tablet 1    carvedilol (COREG) 3.125 MG tablet Take 2 tablets by mouth 2 times daily (with meals) 360 tablet 1    albuterol sulfate HFA (VENTOLIN HFA) 108 (90 Base) MCG/ACT inhaler Inhale 2 puffs into the lungs every 6 hours as needed for Wheezing 18 g 5    Budeson-Glycopyrrol-Formoterol (BREZTRI AEROSPHERE) 160-9-4.8 MCG/ACT AERO Inhale 2 puffs into the lungs in the morning and at bedtime 10.7 g 5    pantoprazole (PROTONIX) 40 MG tablet Take 1 tablet by mouth daily 90 tablet 1    rosuvastatin (CRESTOR) 5 MG tablet Take 1 tablet by mouth daily 90 tablet 1    acetaminophen (TYLENOL) 500 MG tablet Take 2 tablets by mouth every 6 hours as needed for Pain      Multiple Vitamins-Minerals (CENTRUM PO) Take by mouth daily      amoxicillin (AMOXIL) 500 MG capsule Take 4 pills 1 hour prior to dental procedure (Patient not taking: Reported on 6/18/2025) 4 capsule 0     No current facility-administered medications for this visit.       CC:    Patient is seen in follow up for:  1. Persistent atrial fibrillation (HCC)    2. Primary hypertension    3. History of transcatheter aortic valve replacement (TAVR)    4. Chronic anticoagulation    5. Shortness of breath        HPI:  Seen for increased shortness of breath.  Patient notes that she becomes short of breath which is

## 2025-06-19 ENCOUNTER — RESULTS FOLLOW-UP (OUTPATIENT)
Dept: CARDIOLOGY CLINIC | Age: 85
End: 2025-06-19

## 2025-06-19 RX ORDER — TRIAMTERENE AND HYDROCHLOROTHIAZIDE 37.5; 25 MG/1; MG/1
1 CAPSULE ORAL DAILY
Qty: 30 CAPSULE | Refills: 5 | Status: ON HOLD | OUTPATIENT
Start: 2025-06-19

## 2025-06-20 ENCOUNTER — APPOINTMENT (OUTPATIENT)
Dept: GENERAL RADIOLOGY | Age: 85
DRG: 871 | End: 2025-06-20
Payer: MEDICARE

## 2025-06-20 ENCOUNTER — APPOINTMENT (OUTPATIENT)
Dept: CT IMAGING | Age: 85
DRG: 871 | End: 2025-06-20
Payer: MEDICARE

## 2025-06-20 ENCOUNTER — HOSPITAL ENCOUNTER (INPATIENT)
Age: 85
LOS: 3 days | Discharge: HOME | DRG: 871 | End: 2025-06-23
Attending: EMERGENCY MEDICINE | Admitting: HOSPITALIST
Payer: MEDICARE

## 2025-06-20 DIAGNOSIS — J18.9 PNEUMONIA DUE TO INFECTIOUS ORGANISM, UNSPECIFIED LATERALITY, UNSPECIFIED PART OF LUNG: ICD-10-CM

## 2025-06-20 DIAGNOSIS — J96.01 ACUTE RESPIRATORY FAILURE WITH HYPOXIA (HCC): Primary | ICD-10-CM

## 2025-06-20 PROBLEM — A41.9 SEPSIS (HCC): Chronic | Status: ACTIVE | Noted: 2025-06-20

## 2025-06-20 PROBLEM — A41.9 SEPSIS (HCC): Status: ACTIVE | Noted: 2025-06-20

## 2025-06-20 LAB
ALBUMIN SERPL-MCNC: 3.8 G/DL (ref 3.5–5.2)
ALP SERPL-CCNC: 67 U/L (ref 35–104)
ALT SERPL-CCNC: 6 U/L (ref 0–32)
ANION GAP SERPL CALCULATED.3IONS-SCNC: 15 MMOL/L (ref 7–16)
AST SERPL-CCNC: 12 U/L (ref 0–31)
B PARAP IS1001 DNA NPH QL NAA+NON-PROBE: NOT DETECTED
B PERT DNA SPEC QL NAA+PROBE: NOT DETECTED
B.E.: -0.5 MMOL/L (ref -3–3)
BASOPHILS # BLD: 0.06 K/UL (ref 0–0.2)
BASOPHILS NFR BLD: 0 % (ref 0–2)
BILIRUB SERPL-MCNC: 0.8 MG/DL (ref 0–1.2)
BNP SERPL-MCNC: 676 PG/ML (ref 0–450)
BUN SERPL-MCNC: 16 MG/DL (ref 6–23)
C PNEUM DNA NPH QL NAA+NON-PROBE: NOT DETECTED
CALCIUM SERPL-MCNC: 9.4 MG/DL (ref 8.6–10.2)
CHLORIDE SERPL-SCNC: 99 MMOL/L (ref 98–107)
CO2 SERPL-SCNC: 25 MMOL/L (ref 22–29)
COHB: 1.2 % (ref 0–1.5)
CREAT SERPL-MCNC: 0.7 MG/DL (ref 0.5–1)
CRITICAL: ABNORMAL
DATE ANALYZED: ABNORMAL
DATE OF COLLECTION: ABNORMAL
EKG ATRIAL RATE: 82 BPM
EKG Q-T INTERVAL: 406 MS
EKG QRS DURATION: 134 MS
EKG QTC CALCULATION (BAZETT): 462 MS
EKG R AXIS: -16 DEGREES
EKG T AXIS: 77 DEGREES
EKG VENTRICULAR RATE: 78 BPM
EOSINOPHIL # BLD: 0.04 K/UL (ref 0.05–0.5)
EOSINOPHILS RELATIVE PERCENT: 0 % (ref 0–6)
ERYTHROCYTE [DISTWIDTH] IN BLOOD BY AUTOMATED COUNT: 12.8 % (ref 11.5–15)
FIO2: 35 %
FLUAV RNA NPH QL NAA+NON-PROBE: NOT DETECTED
FLUBV RNA NPH QL NAA+NON-PROBE: NOT DETECTED
GFR, ESTIMATED: 80 ML/MIN/1.73M2
GLUCOSE SERPL-MCNC: 127 MG/DL (ref 74–99)
HADV DNA NPH QL NAA+NON-PROBE: NOT DETECTED
HCO3: 24 MMOL/L (ref 22–26)
HCOV 229E RNA NPH QL NAA+NON-PROBE: NOT DETECTED
HCOV HKU1 RNA NPH QL NAA+NON-PROBE: NOT DETECTED
HCOV NL63 RNA NPH QL NAA+NON-PROBE: NOT DETECTED
HCOV OC43 RNA NPH QL NAA+NON-PROBE: NOT DETECTED
HCT VFR BLD AUTO: 41.9 % (ref 34–48)
HGB BLD-MCNC: 13.6 G/DL (ref 11.5–15.5)
HHB: 1 % (ref 0–5)
HMPV RNA NPH QL NAA+NON-PROBE: NOT DETECTED
HPIV1 RNA NPH QL NAA+NON-PROBE: NOT DETECTED
HPIV2 RNA NPH QL NAA+NON-PROBE: NOT DETECTED
HPIV3 RNA NPH QL NAA+NON-PROBE: NOT DETECTED
HPIV4 RNA NPH QL NAA+NON-PROBE: NOT DETECTED
IMM GRANULOCYTES # BLD AUTO: 0.15 K/UL (ref 0–0.58)
IMM GRANULOCYTES NFR BLD: 1 % (ref 0–5)
LAB: ABNORMAL
LACTATE BLDV-SCNC: 0.9 MMOL/L (ref 0.5–1.9)
LYMPHOCYTES NFR BLD: 1.38 K/UL (ref 1.5–4)
LYMPHOCYTES RELATIVE PERCENT: 5 % (ref 20–42)
Lab: 100
M PNEUMO DNA NPH QL NAA+NON-PROBE: NOT DETECTED
MCH RBC QN AUTO: 31.5 PG (ref 26–35)
MCHC RBC AUTO-ENTMCNC: 32.5 G/DL (ref 32–34.5)
MCV RBC AUTO: 97 FL (ref 80–99.9)
METHB: 0.3 % (ref 0–1.5)
MODE: ABNORMAL
MONOCYTES NFR BLD: 1.85 K/UL (ref 0.1–0.95)
MONOCYTES NFR BLD: 7 % (ref 2–12)
NEUTROPHILS NFR BLD: 86 % (ref 43–80)
NEUTS SEG NFR BLD: 21.97 K/UL (ref 1.8–7.3)
O2 CONTENT: 19.7 ML/DL
O2 SATURATION: 99 % (ref 92–98.5)
O2HB: 97.5 % (ref 94–97)
OPERATOR ID: ABNORMAL
PATIENT TEMP: 37 C
PCO2: 38.9 MMHG (ref 35–45)
PEEP/CPAP: 6 CMH2O
PFO2: 4.25 MMHG/%
PH BLOOD GAS: 7.41 (ref 7.35–7.45)
PIP: 12 CMH2O
PLATELET # BLD AUTO: 218 K/UL (ref 130–450)
PMV BLD AUTO: 10.5 FL (ref 7–12)
PO2: 148.9 MMHG (ref 75–100)
POTASSIUM SERPL-SCNC: 4.7 MMOL/L (ref 3.5–5)
PROT SERPL-MCNC: 7 G/DL (ref 6.4–8.3)
RBC # BLD AUTO: 4.32 M/UL (ref 3.5–5.5)
RBC # BLD: ABNORMAL 10*6/UL
RBC # BLD: ABNORMAL 10*6/UL
RI(T): 0.37
RSV RNA NPH QL NAA+NON-PROBE: NOT DETECTED
RV+EV RNA NPH QL NAA+NON-PROBE: NOT DETECTED
SARS-COV-2 RDRP RESP QL NAA+PROBE: NOT DETECTED
SARS-COV-2 RNA NPH QL NAA+NON-PROBE: NOT DETECTED
SODIUM SERPL-SCNC: 139 MMOL/L (ref 132–146)
SOURCE, BLOOD GAS: ABNORMAL
SPECIMEN DESCRIPTION: NORMAL
SPECIMEN DESCRIPTION: NORMAL
THB: 14.2 G/DL (ref 11.5–16.5)
TIME ANALYZED: 109
TROPONIN I SERPL HS-MCNC: 15 NG/L (ref 0–14)
TROPONIN I SERPL HS-MCNC: 16 NG/L (ref 0–14)
WBC OTHER # BLD: 25.5 K/UL (ref 4.5–11.5)

## 2025-06-20 PROCEDURE — 2580000003 HC RX 258: Performed by: HOSPITALIST

## 2025-06-20 PROCEDURE — 83605 ASSAY OF LACTIC ACID: CPT

## 2025-06-20 PROCEDURE — 94640 AIRWAY INHALATION TREATMENT: CPT

## 2025-06-20 PROCEDURE — 2700000000 HC OXYGEN THERAPY PER DAY

## 2025-06-20 PROCEDURE — 99223 1ST HOSP IP/OBS HIGH 75: CPT | Performed by: HOSPITALIST

## 2025-06-20 PROCEDURE — 96375 TX/PRO/DX INJ NEW DRUG ADDON: CPT

## 2025-06-20 PROCEDURE — 71275 CT ANGIOGRAPHY CHEST: CPT

## 2025-06-20 PROCEDURE — 0202U NFCT DS 22 TRGT SARS-COV-2: CPT

## 2025-06-20 PROCEDURE — 93010 ELECTROCARDIOGRAM REPORT: CPT | Performed by: INTERNAL MEDICINE

## 2025-06-20 PROCEDURE — 5A09357 ASSISTANCE WITH RESPIRATORY VENTILATION, LESS THAN 24 CONSECUTIVE HOURS, CONTINUOUS POSITIVE AIRWAY PRESSURE: ICD-10-PCS | Performed by: EMERGENCY MEDICINE

## 2025-06-20 PROCEDURE — 93005 ELECTROCARDIOGRAM TRACING: CPT | Performed by: EMERGENCY MEDICINE

## 2025-06-20 PROCEDURE — 36600 WITHDRAWAL OF ARTERIAL BLOOD: CPT

## 2025-06-20 PROCEDURE — 6360000002 HC RX W HCPCS: Performed by: HOSPITALIST

## 2025-06-20 PROCEDURE — 87040 BLOOD CULTURE FOR BACTERIA: CPT

## 2025-06-20 PROCEDURE — 71045 X-RAY EXAM CHEST 1 VIEW: CPT

## 2025-06-20 PROCEDURE — 6370000000 HC RX 637 (ALT 250 FOR IP): Performed by: HOSPITALIST

## 2025-06-20 PROCEDURE — 2060000000 HC ICU INTERMEDIATE R&B

## 2025-06-20 PROCEDURE — 82805 BLOOD GASES W/O2 SATURATION: CPT

## 2025-06-20 PROCEDURE — 94660 CPAP INITIATION&MGMT: CPT

## 2025-06-20 PROCEDURE — 85025 COMPLETE CBC W/AUTO DIFF WBC: CPT

## 2025-06-20 PROCEDURE — 2580000003 HC RX 258: Performed by: EMERGENCY MEDICINE

## 2025-06-20 PROCEDURE — 87635 SARS-COV-2 COVID-19 AMP PRB: CPT

## 2025-06-20 PROCEDURE — 2500000003 HC RX 250 WO HCPCS: Performed by: EMERGENCY MEDICINE

## 2025-06-20 PROCEDURE — 6360000002 HC RX W HCPCS: Performed by: EMERGENCY MEDICINE

## 2025-06-20 PROCEDURE — 6370000000 HC RX 637 (ALT 250 FOR IP): Performed by: STUDENT IN AN ORGANIZED HEALTH CARE EDUCATION/TRAINING PROGRAM

## 2025-06-20 PROCEDURE — 2500000003 HC RX 250 WO HCPCS: Performed by: HOSPITALIST

## 2025-06-20 PROCEDURE — 96365 THER/PROPH/DIAG IV INF INIT: CPT

## 2025-06-20 PROCEDURE — 99285 EMERGENCY DEPT VISIT HI MDM: CPT

## 2025-06-20 PROCEDURE — 6370000000 HC RX 637 (ALT 250 FOR IP): Performed by: EMERGENCY MEDICINE

## 2025-06-20 PROCEDURE — 80053 COMPREHEN METABOLIC PANEL: CPT

## 2025-06-20 PROCEDURE — 83880 ASSAY OF NATRIURETIC PEPTIDE: CPT

## 2025-06-20 PROCEDURE — 84484 ASSAY OF TROPONIN QUANT: CPT

## 2025-06-20 RX ORDER — SODIUM CHLORIDE 9 MG/ML
INJECTION, SOLUTION INTRAVENOUS CONTINUOUS
Status: ACTIVE | OUTPATIENT
Start: 2025-06-20 | End: 2025-06-21

## 2025-06-20 RX ORDER — SODIUM CHLORIDE 0.9 % (FLUSH) 0.9 %
5-40 SYRINGE (ML) INJECTION EVERY 12 HOURS SCHEDULED
Status: DISCONTINUED | OUTPATIENT
Start: 2025-06-20 | End: 2025-06-23 | Stop reason: HOSPADM

## 2025-06-20 RX ORDER — METHYLPREDNISOLONE SODIUM SUCCINATE 125 MG/2ML
125 INJECTION INTRAMUSCULAR; INTRAVENOUS ONCE
Status: COMPLETED | OUTPATIENT
Start: 2025-06-20 | End: 2025-06-20

## 2025-06-20 RX ORDER — ROSUVASTATIN CALCIUM 5 MG/1
5 TABLET, COATED ORAL DAILY
Status: DISCONTINUED | OUTPATIENT
Start: 2025-06-20 | End: 2025-06-23 | Stop reason: HOSPADM

## 2025-06-20 RX ORDER — IOPAMIDOL 755 MG/ML
75 INJECTION, SOLUTION INTRAVASCULAR
Status: DISCONTINUED | OUTPATIENT
Start: 2025-06-20 | End: 2025-06-23 | Stop reason: HOSPADM

## 2025-06-20 RX ORDER — ONDANSETRON 2 MG/ML
4 INJECTION INTRAMUSCULAR; INTRAVENOUS EVERY 6 HOURS PRN
Status: DISCONTINUED | OUTPATIENT
Start: 2025-06-20 | End: 2025-06-23 | Stop reason: HOSPADM

## 2025-06-20 RX ORDER — TRIAMTERENE AND HYDROCHLOROTHIAZIDE 37.5; 25 MG/1; MG/1
1 TABLET ORAL DAILY
Status: DISCONTINUED | OUTPATIENT
Start: 2025-06-20 | End: 2025-06-23 | Stop reason: HOSPADM

## 2025-06-20 RX ORDER — SODIUM CHLORIDE 0.9 % (FLUSH) 0.9 %
5-40 SYRINGE (ML) INJECTION PRN
Status: DISCONTINUED | OUTPATIENT
Start: 2025-06-20 | End: 2025-06-23 | Stop reason: HOSPADM

## 2025-06-20 RX ORDER — ONDANSETRON 4 MG/1
4 TABLET, ORALLY DISINTEGRATING ORAL EVERY 8 HOURS PRN
Status: DISCONTINUED | OUTPATIENT
Start: 2025-06-20 | End: 2025-06-23 | Stop reason: HOSPADM

## 2025-06-20 RX ORDER — METHYLPREDNISOLONE SODIUM SUCCINATE 40 MG/ML
40 INJECTION INTRAMUSCULAR; INTRAVENOUS EVERY 8 HOURS
Status: DISCONTINUED | OUTPATIENT
Start: 2025-06-20 | End: 2025-06-21

## 2025-06-20 RX ORDER — PANTOPRAZOLE SODIUM 40 MG/1
40 TABLET, DELAYED RELEASE ORAL
Status: DISCONTINUED | OUTPATIENT
Start: 2025-06-20 | End: 2025-06-23 | Stop reason: HOSPADM

## 2025-06-20 RX ORDER — GUAIFENESIN/DEXTROMETHORPHAN 100-10MG/5
5 SYRUP ORAL EVERY 4 HOURS PRN
Status: DISCONTINUED | OUTPATIENT
Start: 2025-06-20 | End: 2025-06-23 | Stop reason: HOSPADM

## 2025-06-20 RX ORDER — CARVEDILOL 6.25 MG/1
6.25 TABLET ORAL 2 TIMES DAILY WITH MEALS
Status: DISCONTINUED | OUTPATIENT
Start: 2025-06-20 | End: 2025-06-23 | Stop reason: HOSPADM

## 2025-06-20 RX ORDER — POLYETHYLENE GLYCOL 3350 17 G/17G
17 POWDER, FOR SOLUTION ORAL DAILY PRN
Status: DISCONTINUED | OUTPATIENT
Start: 2025-06-20 | End: 2025-06-23 | Stop reason: HOSPADM

## 2025-06-20 RX ORDER — 0.9 % SODIUM CHLORIDE 0.9 %
1000 INTRAVENOUS SOLUTION INTRAVENOUS ONCE
Status: COMPLETED | OUTPATIENT
Start: 2025-06-20 | End: 2025-06-20

## 2025-06-20 RX ORDER — ACETAMINOPHEN 650 MG/1
650 SUPPOSITORY RECTAL EVERY 6 HOURS PRN
Status: DISCONTINUED | OUTPATIENT
Start: 2025-06-20 | End: 2025-06-23 | Stop reason: HOSPADM

## 2025-06-20 RX ORDER — IPRATROPIUM BROMIDE AND ALBUTEROL SULFATE 2.5; .5 MG/3ML; MG/3ML
3 SOLUTION RESPIRATORY (INHALATION) ONCE
Status: COMPLETED | OUTPATIENT
Start: 2025-06-20 | End: 2025-06-20

## 2025-06-20 RX ORDER — IPRATROPIUM BROMIDE AND ALBUTEROL SULFATE 2.5; .5 MG/3ML; MG/3ML
1 SOLUTION RESPIRATORY (INHALATION)
Status: DISCONTINUED | OUTPATIENT
Start: 2025-06-20 | End: 2025-06-21

## 2025-06-20 RX ORDER — SENNOSIDES 8.6 MG/1
1 TABLET ORAL DAILY PRN
Status: DISCONTINUED | OUTPATIENT
Start: 2025-06-20 | End: 2025-06-23 | Stop reason: HOSPADM

## 2025-06-20 RX ORDER — SODIUM CHLORIDE 9 MG/ML
INJECTION, SOLUTION INTRAVENOUS PRN
Status: DISCONTINUED | OUTPATIENT
Start: 2025-06-20 | End: 2025-06-23 | Stop reason: HOSPADM

## 2025-06-20 RX ORDER — ACETAMINOPHEN 325 MG/1
650 TABLET ORAL EVERY 6 HOURS PRN
Status: DISCONTINUED | OUTPATIENT
Start: 2025-06-20 | End: 2025-06-23 | Stop reason: HOSPADM

## 2025-06-20 RX ADMIN — ROSUVASTATIN CALCIUM 5 MG: 5 TABLET, FILM COATED ORAL at 09:00

## 2025-06-20 RX ADMIN — SODIUM CHLORIDE: 0.9 INJECTION, SOLUTION INTRAVENOUS at 23:40

## 2025-06-20 RX ADMIN — SODIUM CHLORIDE 1000 ML: 0.9 INJECTION, SOLUTION INTRAVENOUS at 03:23

## 2025-06-20 RX ADMIN — DOXYCYCLINE 100 MG: 100 INJECTION, POWDER, LYOPHILIZED, FOR SOLUTION INTRAVENOUS at 17:46

## 2025-06-20 RX ADMIN — DOXYCYCLINE 100 MG: 100 INJECTION, POWDER, LYOPHILIZED, FOR SOLUTION INTRAVENOUS at 03:27

## 2025-06-20 RX ADMIN — IPRATROPIUM BROMIDE AND ALBUTEROL SULFATE 1 DOSE: .5; 2.5 SOLUTION RESPIRATORY (INHALATION) at 15:35

## 2025-06-20 RX ADMIN — SODIUM CHLORIDE, PRESERVATIVE FREE 10 ML: 5 INJECTION INTRAVENOUS at 09:00

## 2025-06-20 RX ADMIN — IPRATROPIUM BROMIDE AND ALBUTEROL SULFATE 1 DOSE: .5; 2.5 SOLUTION RESPIRATORY (INHALATION) at 08:25

## 2025-06-20 RX ADMIN — APIXABAN 5 MG: 5 TABLET, FILM COATED ORAL at 21:16

## 2025-06-20 RX ADMIN — METHYLPREDNISOLONE SODIUM SUCCINATE 40 MG: 40 INJECTION INTRAMUSCULAR; INTRAVENOUS at 08:59

## 2025-06-20 RX ADMIN — METHYLPREDNISOLONE SODIUM SUCCINATE 40 MG: 40 INJECTION INTRAMUSCULAR; INTRAVENOUS at 23:45

## 2025-06-20 RX ADMIN — METHYLPREDNISOLONE SODIUM SUCCINATE 40 MG: 40 INJECTION INTRAMUSCULAR; INTRAVENOUS at 17:41

## 2025-06-20 RX ADMIN — IPRATROPIUM BROMIDE AND ALBUTEROL SULFATE 1 DOSE: .5; 2.5 SOLUTION RESPIRATORY (INHALATION) at 19:59

## 2025-06-20 RX ADMIN — METHYLPREDNISOLONE SODIUM SUCCINATE 125 MG: 125 INJECTION, POWDER, LYOPHILIZED, FOR SOLUTION INTRAMUSCULAR; INTRAVENOUS at 01:27

## 2025-06-20 RX ADMIN — SODIUM CHLORIDE: 0.9 INJECTION, SOLUTION INTRAVENOUS at 06:28

## 2025-06-20 RX ADMIN — CARVEDILOL 6.25 MG: 6.25 TABLET, FILM COATED ORAL at 09:00

## 2025-06-20 RX ADMIN — APIXABAN 5 MG: 5 TABLET, FILM COATED ORAL at 09:00

## 2025-06-20 RX ADMIN — IPRATROPIUM BROMIDE AND ALBUTEROL SULFATE 3 DOSE: 2.5; .5 SOLUTION RESPIRATORY (INHALATION) at 00:50

## 2025-06-20 RX ADMIN — GUAIFENESIN SYRUP AND DEXTROMETHORPHAN 5 ML: 100; 10 SYRUP ORAL at 19:24

## 2025-06-20 RX ADMIN — CARVEDILOL 6.25 MG: 6.25 TABLET, FILM COATED ORAL at 17:41

## 2025-06-20 RX ADMIN — IPRATROPIUM BROMIDE AND ALBUTEROL SULFATE 1 DOSE: .5; 2.5 SOLUTION RESPIRATORY (INHALATION) at 11:32

## 2025-06-20 RX ADMIN — WATER 2000 MG: 1 INJECTION INTRAMUSCULAR; INTRAVENOUS; SUBCUTANEOUS at 03:23

## 2025-06-20 ASSESSMENT — PAIN SCALES - GENERAL: PAINLEVEL_OUTOF10: 0

## 2025-06-20 NOTE — H&P
[facility] Hospitalist Group History and Physical    PATIENT DEMOGRAPHICS  Name: Jayne Weaver  Age: 85 y.o.  Sex: female      ASSESSMENT  Principal Problem:    Pneumonia due to organism  Active Problems:    Sepsis (HCC)    Primary hypertension    Mixed hyperlipidemia    Persistent atrial fibrillation (HCC)    Chronic anticoagulation    S/P heart valve repair  Resolved Problems:    * No resolved hospital problems. *         PLAN  Sepsis: POA, SIRS+ 3/4 (tachycardic, tachypneic and leukocytosis), likely 2/2 bilateral multifocal pneumonia, initial lactate  0.9, BCx collected, continue empiric abx, reassess daily to de-escalate abx as guided by microbiologic data.   Pneumonia: POA, IV fluids, prn Tessalon Perles. IV Rocephin & Azithromycin.   Acute Hypoxemic Respiratory Failure: POA, likely 2/2 pneumonia not on home O2, initial SpO2 80s on room air, presently, pr requiring BiPAP with an SpO2 of 98%, Continuous pulse oximetry. Cont supplemental O2, titrate to an SpO2 of  90-92%.  COPD exacerbation: POA, scheduled duo-nebs q4hr while awake & prn. IV Solu-medrol Taper.  Tessalon Perles 100 mg q8hr prn. IV abx.  PAF: CHADS2-VASC Score = 3,  Cont home  daily & Eliquis 5 mg daily.  HTN: hold home dyazide   HLD: Continue home Crestor  GERD: continue PPI  Class II Obesity:  BMI 38.64  on diet & lifestyle modifications.             CARE COORDINATION  Consultations: None  Code Status: Full Code  DVT PPx: []Lovenox []Heparin []SCDs [x] Warfarin/NOAC []Encouraged ambulation  Disposition: []Med/Surg  []Med/Tele [x] Intermediate [] ICU/CCU  Admit status: [] Observation [x] Inpatient       CHIEF COMPLAINT  had concerns including Shortness of Breath (Off and on for 3 months worse tonight; can't breath).    HISTORY OF PRESENT ILLNESS    85-year-old female who presented to the ED for worsening shortness of breath. Patient has multifocal pneumonia and respiratory failure requiring BiPAP. Patient presented to the ED for

## 2025-06-20 NOTE — ED NOTES
ED to Inpatient Handoff Report    Notified [facility] that electronic handoff available and patient ready for transport to room 431.    Safety Risks: Risk of falls    Patient in Restraints: no    Constant Observer or Patient : no    Telemetry Monitoring Ordered: Yes          Order to transfer to unit without monitor: YES    Last MEWS:  Time completed: 0432    Deterioration Index: 30.75    Vitals:    06/20/25 0133 06/20/25 0233 06/20/25 0327 06/20/25 0432   BP: 107/69 (!) 92/44 (!) 110/49 (!) 134/57   Pulse: 77 65 87 93   Resp: 28 25 22 22   Temp:    98.2 °F (36.8 °C)   SpO2: 100% 100% 98% 99%       Opportunity for questions and clarification was provided.

## 2025-06-20 NOTE — ED PROVIDER NOTES
[facility] EMERGENCY DEPARTMENT  EMERGENCY DEPARTMENT ENCOUNTER        Pt Name: Jayne Weaver  MRN: [id number]  Birthdate 1940  Date of evaluation: 6/20/2025  Provider: Hector Corona DO  PCP: Navdeep Rankin DO  Note Started: 1:11 AM EDT 6/20/25    CHIEF COMPLAINT       Chief Complaint   Patient presents with    Shortness of Breath     Off and on for 3 months worse tonight; can't breath       HISTORY OF PRESENT ILLNESS: 1 or more Elements   History From: Patient patient    Limitations to history : None    Jayne Weaver is a 85 y.o. female who presents to the emergency department for shortness of breath.  The patient states that over the last several months she has intermittent episodes of shortness of breath.  She states that over the last couple days her shortness of breath is worsened and tonight she felt as if she could not catch her breath therefore she came to the ED to be evaluated.  Does complain of cough nonproductive.  Denies any chest pain.  No fevers or chills.  No nausea vomiting.    Nursing Notes were all reviewed and agreed with or any disagreements were addressed in the HPI.      REVIEW OF EXTERNAL NOTE :       PDMP Monitoring:    Last PDMP Derrick as Reviewed:  Review User Review Instant Review Result            Urine Drug Screenings (1 yr)    No resulted procedures found.       Medication Contract and Consent for Opioid Use Documents Filed        No documents found                      REVIEW OF SYSTEMS :           Positives and Pertinent negatives as per HPI.     SURGICAL HISTORY     Past Surgical History:   Procedure Laterality Date    BREAST SURGERY Right     lumpectomy    CARDIAC PROCEDURE N/A 10/21/2024    Left and right heart cath / coronary angiography performed by Charles Morton MD at [facility] CARDIAC CATH LAB    CARDIAC PROCEDURE N/A 1/8/2025    TRANSCATHETER AORTIC VALVE REPLACEMENT FEMORAL APPROACH performed by Charles Morton MD at [facility] OR    CARDIAC

## 2025-06-20 NOTE — PLAN OF CARE
Nocturnist note reviewed, agree with plan and additions as below:    Exam  General Appearance: alert and oriented to person, place, time. NAD, resting in bed  Skin: warm and dry  Head: normocephalic and atraumatic  Eyes: PERRL, EOMI, conjunctivae normal  Neck: neck supple, trachea midline   Pulmonary/Chest: no respiratory distress, diminished diffusely, 2-3L NC  Cardiovascular: RRR, no murmurs  Abdomen: soft, non-tender, non-distended, normal bowel sounds  Extremities: normal ROM, no edema  Neurologic: no cranial nerve deficit and speech normal    A/P  Sepsis: POA, SIRS+ 3/4 (tachycardic, tachypneic and leukocytosis), likely 2/2 bilateral multifocal pneumonia, initial lactate 0.9, BCx collected, continue empiric abx, reassess daily to de-escalate abx as guided by microbiologic data.   Pneumonia: POA, IV fluids, prn Tessalon Perles. IV Rocephin & Azithromycin.   Acute Hypoxemic Respiratory Failure: POA, likely 2/2 pneumonia not on home O2, initial SpO2 80s on room air, presently, pr requiring BiPAP with an SpO2 of 98%, Continuous pulse oximetry. Cont supplemental O2, titrate to an SpO2 of  90-92%.  COPD exacerbation: POA, scheduled duo-nebs q4hr while awake & prn. IV Solu-medrol Taper.  Tessalon Perles 100 mg q8hr prn. IV abx as above. Follows Pulm (Dr Lee)  PAF: CHADS2-VASC Score = 3,  Cont home  daily & Eliquis 5 mg daily.  HTN: hold home dyazide   HLD: Continue home Crestor  GERD: continue PPI  Class II Obesity:  BMI 38.64  on diet & lifestyle modifications.     Dispo: anticipate home next 1-2d    NOTE: Portions of this report may have been transcribed using voice recognition software. Every effort was made to ensure accuracy; however, inadvertent computerized transcription errors may be present.  Electronically signed by Haile Dangelo MD on 6/20/2025 at 11:06 AM

## 2025-06-21 LAB
ANION GAP SERPL CALCULATED.3IONS-SCNC: 9 MMOL/L (ref 7–16)
BASOPHILS # BLD: 0.03 K/UL (ref 0–0.2)
BASOPHILS NFR BLD: 0 % (ref 0–2)
BUN SERPL-MCNC: 23 MG/DL (ref 6–23)
CALCIUM SERPL-MCNC: 9.2 MG/DL (ref 8.6–10.2)
CHLORIDE SERPL-SCNC: 99 MMOL/L (ref 98–107)
CO2 SERPL-SCNC: 25 MMOL/L (ref 22–29)
CREAT SERPL-MCNC: 0.6 MG/DL (ref 0.5–1)
EOSINOPHIL # BLD: 0 K/UL (ref 0.05–0.5)
EOSINOPHILS RELATIVE PERCENT: 0 % (ref 0–6)
ERYTHROCYTE [DISTWIDTH] IN BLOOD BY AUTOMATED COUNT: 12.7 % (ref 11.5–15)
GFR, ESTIMATED: 88 ML/MIN/1.73M2
GLUCOSE SERPL-MCNC: 156 MG/DL (ref 74–99)
HCT VFR BLD AUTO: 39.3 % (ref 34–48)
HGB BLD-MCNC: 12.4 G/DL (ref 11.5–15.5)
IMM GRANULOCYTES # BLD AUTO: 0.15 K/UL (ref 0–0.58)
IMM GRANULOCYTES NFR BLD: 1 % (ref 0–5)
LYMPHOCYTES NFR BLD: 0.66 K/UL (ref 1.5–4)
LYMPHOCYTES RELATIVE PERCENT: 3 % (ref 20–42)
MCH RBC QN AUTO: 31 PG (ref 26–35)
MCHC RBC AUTO-ENTMCNC: 31.6 G/DL (ref 32–34.5)
MCV RBC AUTO: 98.3 FL (ref 80–99.9)
MONOCYTES NFR BLD: 0.63 K/UL (ref 0.1–0.95)
MONOCYTES NFR BLD: 3 % (ref 2–12)
NEUTROPHILS NFR BLD: 93 % (ref 43–80)
NEUTS SEG NFR BLD: 19.16 K/UL (ref 1.8–7.3)
PLATELET # BLD AUTO: 194 K/UL (ref 130–450)
PMV BLD AUTO: 11.3 FL (ref 7–12)
POTASSIUM SERPL-SCNC: 4.9 MMOL/L (ref 3.5–5)
RBC # BLD AUTO: 4 M/UL (ref 3.5–5.5)
SODIUM SERPL-SCNC: 133 MMOL/L (ref 132–146)
WBC OTHER # BLD: 20.6 K/UL (ref 4.5–11.5)

## 2025-06-21 PROCEDURE — 2500000003 HC RX 250 WO HCPCS: Performed by: HOSPITALIST

## 2025-06-21 PROCEDURE — 80048 BASIC METABOLIC PNL TOTAL CA: CPT

## 2025-06-21 PROCEDURE — 2700000000 HC OXYGEN THERAPY PER DAY

## 2025-06-21 PROCEDURE — 6360000002 HC RX W HCPCS: Performed by: HOSPITALIST

## 2025-06-21 PROCEDURE — 6360000002 HC RX W HCPCS: Performed by: STUDENT IN AN ORGANIZED HEALTH CARE EDUCATION/TRAINING PROGRAM

## 2025-06-21 PROCEDURE — 2060000000 HC ICU INTERMEDIATE R&B

## 2025-06-21 PROCEDURE — 2580000003 HC RX 258: Performed by: HOSPITALIST

## 2025-06-21 PROCEDURE — 85025 COMPLETE CBC W/AUTO DIFF WBC: CPT

## 2025-06-21 PROCEDURE — 99232 SBSQ HOSP IP/OBS MODERATE 35: CPT | Performed by: STUDENT IN AN ORGANIZED HEALTH CARE EDUCATION/TRAINING PROGRAM

## 2025-06-21 PROCEDURE — 94660 CPAP INITIATION&MGMT: CPT

## 2025-06-21 PROCEDURE — 6370000000 HC RX 637 (ALT 250 FOR IP): Performed by: HOSPITALIST

## 2025-06-21 PROCEDURE — 6370000000 HC RX 637 (ALT 250 FOR IP): Performed by: STUDENT IN AN ORGANIZED HEALTH CARE EDUCATION/TRAINING PROGRAM

## 2025-06-21 RX ORDER — IPRATROPIUM BROMIDE AND ALBUTEROL SULFATE 2.5; .5 MG/3ML; MG/3ML
1 SOLUTION RESPIRATORY (INHALATION) EVERY 4 HOURS PRN
Status: DISCONTINUED | OUTPATIENT
Start: 2025-06-21 | End: 2025-06-23 | Stop reason: HOSPADM

## 2025-06-21 RX ORDER — METHYLPREDNISOLONE SODIUM SUCCINATE 40 MG/ML
40 INJECTION INTRAMUSCULAR; INTRAVENOUS EVERY 12 HOURS
Status: COMPLETED | OUTPATIENT
Start: 2025-06-21 | End: 2025-06-22

## 2025-06-21 RX ORDER — FUROSEMIDE 10 MG/ML
20 INJECTION INTRAMUSCULAR; INTRAVENOUS ONCE
Status: COMPLETED | OUTPATIENT
Start: 2025-06-21 | End: 2025-06-21

## 2025-06-21 RX ORDER — PREDNISONE 20 MG/1
40 TABLET ORAL DAILY
Status: DISCONTINUED | OUTPATIENT
Start: 2025-06-23 | End: 2025-06-23 | Stop reason: HOSPADM

## 2025-06-21 RX ADMIN — APIXABAN 5 MG: 5 TABLET, FILM COATED ORAL at 21:14

## 2025-06-21 RX ADMIN — ROSUVASTATIN CALCIUM 5 MG: 5 TABLET, FILM COATED ORAL at 08:20

## 2025-06-21 RX ADMIN — CARVEDILOL 6.25 MG: 6.25 TABLET, FILM COATED ORAL at 17:18

## 2025-06-21 RX ADMIN — DOXYCYCLINE 100 MG: 100 INJECTION, POWDER, LYOPHILIZED, FOR SOLUTION INTRAVENOUS at 04:56

## 2025-06-21 RX ADMIN — DOXYCYCLINE 100 MG: 100 INJECTION, POWDER, LYOPHILIZED, FOR SOLUTION INTRAVENOUS at 16:10

## 2025-06-21 RX ADMIN — GUAIFENESIN SYRUP AND DEXTROMETHORPHAN 5 ML: 100; 10 SYRUP ORAL at 12:52

## 2025-06-21 RX ADMIN — APIXABAN 5 MG: 5 TABLET, FILM COATED ORAL at 08:20

## 2025-06-21 RX ADMIN — FUROSEMIDE 20 MG: 10 INJECTION, SOLUTION INTRAMUSCULAR; INTRAVENOUS at 16:03

## 2025-06-21 RX ADMIN — CARVEDILOL 6.25 MG: 6.25 TABLET, FILM COATED ORAL at 08:20

## 2025-06-21 RX ADMIN — SODIUM CHLORIDE, PRESERVATIVE FREE 10 ML: 5 INJECTION INTRAVENOUS at 08:20

## 2025-06-21 RX ADMIN — WATER 2000 MG: 1 INJECTION INTRAMUSCULAR; INTRAVENOUS; SUBCUTANEOUS at 04:43

## 2025-06-21 RX ADMIN — METHYLPREDNISOLONE SODIUM SUCCINATE 40 MG: 40 INJECTION INTRAMUSCULAR; INTRAVENOUS at 21:14

## 2025-06-21 RX ADMIN — SODIUM CHLORIDE, PRESERVATIVE FREE 10 ML: 5 INJECTION INTRAVENOUS at 21:14

## 2025-06-21 RX ADMIN — METHYLPREDNISOLONE SODIUM SUCCINATE 40 MG: 40 INJECTION INTRAMUSCULAR; INTRAVENOUS at 08:21

## 2025-06-21 RX ADMIN — PANTOPRAZOLE SODIUM 40 MG: 40 TABLET, DELAYED RELEASE ORAL at 06:35

## 2025-06-21 ASSESSMENT — PAIN SCALES - GENERAL
PAINLEVEL_OUTOF10: 0

## 2025-06-21 NOTE — RT PROTOCOL NOTE
RT Nebulizer Bronchodilator Protocol Note    There is a bronchodilator order in the chart from a provider indicating to follow the RT Bronchodilator Protocol and there is an “Initiate RT Bronchodilator Protocol” order as well (see protocol at bottom of note).    CXR Findings:  XR CHEST PORTABLE  Result Date: 6/20/2025  1. No sign of acute cardiopulmonary disease. 2. Interval increase in size of the heart which is now top normal in size.       The findings from the last RT Protocol Assessment were as follows:  Smoking: None or smoker <15 pack years  Respiratory Pattern: Regular pattern and RR 12-20 bpm  Breath Sounds: Clear breath sounds  Cough: Strong, spontaneous, non-productive  Indication for Bronchodilator Therapy:    Bronchodilator Assessment Score: 0    Aerosolized bronchodilator medication orders have been revised according to the RT Nebulizer Bronchodilator Protocol below.    Respiratory Therapist to perform RT Therapy Protocol Assessment initially then follow the protocol.  Repeat RT Therapy Protocol Assessment PRN for score 0-3 or on second treatment, BID, and PRN for scores above 3.    No Indications - adjust the frequency to every 6 hours PRN wheezing or bronchospasm, if no treatments needed after 48 hours then discontinue using Per Protocol order mode.     If indication present, adjust the RT bronchodilator orders based on the Bronchodilator Assessment Score as indicated below.  If a patient is on this medication at home then do not decrease Frequency below that used at home.    0-3 - enter or revise RT bronchodilator order(s) to equivalent RT Bronchodilator order with Frequency of every 4 hours PRN for wheezing or increased work of breathing using Per Protocol order mode.       4-6 - enter or revise RT Bronchodilator order(s) to two equivalent RT bronchodilator orders with one order with BID Frequency and one order with Frequency of every 4 hours PRN wheezing or increased work of breathing using Per

## 2025-06-21 NOTE — CARE COORDINATION
Patient arrives from home environment where she resides independently - senior living apartment, 1 floor setup.  She uses no walker, no w/c, no cane.  Was still driving, shopping, cleaning, etc. There is a call/alert system in place at home.  Son voices she has nebulizer and rescue inhaler but no O2, no cpap/bipap at home. There is no hx of snf nor holly as well as no use of HHC in past.  Patient is established with a pcp and denies any issue with retail pharmaceutical coverage.  Pt/OT evals are ordered, await findings.  Patient will need followed and assisted with discharge planning as necessary.   Per son - would anticipate a return to home with no new needs unless does not wean from supplemental O2.  Patient will need followed and assisted with discharge planning as necessary.   Lane Wells, MSN RN  [facility] Case Management  [phone]

## 2025-06-21 NOTE — PLAN OF CARE
Problem: Discharge Planning  Goal: Discharge to home or other facility with appropriate resources  Outcome: Progressing     Problem: Skin/Tissue Integrity  Goal: Skin integrity remains intact  Description: 1.  Monitor for areas of redness and/or skin breakdown  2.  Assess vascular access sites hourly  3.  Every 4-6 hours minimum:  Change oxygen saturation probe site  4.  Every 4-6 hours:  If on nasal continuous positive airway pressure, respiratory therapy assess nares and determine need for appliance change or resting period  Outcome: Progressing  Flowsheets (Taken 6/21/2025 7599)  Skin Integrity Remains Intact: Monitor for areas of redness and/or skin breakdown     Problem: Safety - Adult  Goal: Free from fall injury  Outcome: Progressing     Problem: ABCDS Injury Assessment  Goal: Absence of physical injury  Outcome: Progressing     Problem: Pain  Goal: Verbalizes/displays adequate comfort level or baseline comfort level  Outcome: Progressing

## 2025-06-21 NOTE — PLAN OF CARE
Problem: Discharge Planning  Goal: Discharge to home or other facility with appropriate resources  6/21/2025 0955 by Joni Arroyo RN  Outcome: Progressing  6/21/2025 0655 by Cary Smith RN  Outcome: Progressing     Problem: Skin/Tissue Integrity  Goal: Skin integrity remains intact  Description: 1.  Monitor for areas of redness and/or skin breakdown  2.  Assess vascular access sites hourly  3.  Every 4-6 hours minimum:  Change oxygen saturation probe site  4.  Every 4-6 hours:  If on nasal continuous positive airway pressure, respiratory therapy assess nares and determine need for appliance change or resting period  6/21/2025 0955 by Joni Arroyo RN  Outcome: Progressing  6/21/2025 0655 by Cary Smith RN  Outcome: Progressing  Flowsheets (Taken 6/21/2025 0654)  Skin Integrity Remains Intact: Monitor for areas of redness and/or skin breakdown     Problem: Safety - Adult  Goal: Free from fall injury  6/21/2025 0955 by Joni Arroyo RN  Outcome: Progressing  6/21/2025 0655 by Cary mSith RN  Outcome: Progressing     Problem: ABCDS Injury Assessment  Goal: Absence of physical injury  6/21/2025 0955 by Joni Arroyo RN  Outcome: Progressing  6/21/2025 0655 by Cary Smith RN  Outcome: Progressing     Problem: Pain  Goal: Verbalizes/displays adequate comfort level or baseline comfort level  6/21/2025 0955 by Joni Arroyo RN  Outcome: Progressing  6/21/2025 0655 by Cary Smith RN  Outcome: Progressing

## 2025-06-22 LAB
ANION GAP SERPL CALCULATED.3IONS-SCNC: 9 MMOL/L (ref 7–16)
BASOPHILS # BLD: 0 K/UL (ref 0–0.2)
BASOPHILS NFR BLD: 0 % (ref 0–2)
BUN SERPL-MCNC: 28 MG/DL (ref 6–23)
CALCIUM SERPL-MCNC: 9.5 MG/DL (ref 8.6–10.2)
CHLORIDE SERPL-SCNC: 100 MMOL/L (ref 98–107)
CO2 SERPL-SCNC: 27 MMOL/L (ref 22–29)
CREAT SERPL-MCNC: 0.6 MG/DL (ref 0.5–1)
EOSINOPHIL # BLD: 0 K/UL (ref 0.05–0.5)
EOSINOPHILS RELATIVE PERCENT: 0 % (ref 0–6)
ERYTHROCYTE [DISTWIDTH] IN BLOOD BY AUTOMATED COUNT: 12.7 % (ref 11.5–15)
GFR, ESTIMATED: 87 ML/MIN/1.73M2
GLUCOSE SERPL-MCNC: 139 MG/DL (ref 74–99)
HCT VFR BLD AUTO: 40.7 % (ref 34–48)
HGB BLD-MCNC: 12.8 G/DL (ref 11.5–15.5)
LYMPHOCYTES NFR BLD: 0.95 K/UL (ref 1.5–4)
LYMPHOCYTES RELATIVE PERCENT: 4 % (ref 20–42)
MCH RBC QN AUTO: 31 PG (ref 26–35)
MCHC RBC AUTO-ENTMCNC: 31.4 G/DL (ref 32–34.5)
MCV RBC AUTO: 98.5 FL (ref 80–99.9)
MONOCYTES NFR BLD: 0.95 K/UL (ref 0.1–0.95)
MONOCYTES NFR BLD: 4 % (ref 2–12)
NEUTROPHILS NFR BLD: 91 % (ref 43–80)
NEUTS SEG NFR BLD: 19.9 K/UL (ref 1.8–7.3)
PLATELET # BLD AUTO: 223 K/UL (ref 130–450)
PMV BLD AUTO: 11 FL (ref 7–12)
POTASSIUM SERPL-SCNC: 5.1 MMOL/L (ref 3.5–5)
RBC # BLD AUTO: 4.13 M/UL (ref 3.5–5.5)
RBC # BLD: NORMAL 10*6/UL
SODIUM SERPL-SCNC: 136 MMOL/L (ref 132–146)
WBC OTHER # BLD: 21.8 K/UL (ref 4.5–11.5)

## 2025-06-22 PROCEDURE — 6370000000 HC RX 637 (ALT 250 FOR IP): Performed by: HOSPITALIST

## 2025-06-22 PROCEDURE — 85025 COMPLETE CBC W/AUTO DIFF WBC: CPT

## 2025-06-22 PROCEDURE — 2060000000 HC ICU INTERMEDIATE R&B

## 2025-06-22 PROCEDURE — 94660 CPAP INITIATION&MGMT: CPT

## 2025-06-22 PROCEDURE — 2580000003 HC RX 258: Performed by: HOSPITALIST

## 2025-06-22 PROCEDURE — 99232 SBSQ HOSP IP/OBS MODERATE 35: CPT | Performed by: STUDENT IN AN ORGANIZED HEALTH CARE EDUCATION/TRAINING PROGRAM

## 2025-06-22 PROCEDURE — 2700000000 HC OXYGEN THERAPY PER DAY

## 2025-06-22 PROCEDURE — 6360000002 HC RX W HCPCS: Performed by: STUDENT IN AN ORGANIZED HEALTH CARE EDUCATION/TRAINING PROGRAM

## 2025-06-22 PROCEDURE — 2500000003 HC RX 250 WO HCPCS: Performed by: HOSPITALIST

## 2025-06-22 PROCEDURE — 6360000002 HC RX W HCPCS: Performed by: HOSPITALIST

## 2025-06-22 PROCEDURE — 80048 BASIC METABOLIC PNL TOTAL CA: CPT

## 2025-06-22 RX ORDER — FUROSEMIDE 10 MG/ML
20 INJECTION INTRAMUSCULAR; INTRAVENOUS ONCE
Status: COMPLETED | OUTPATIENT
Start: 2025-06-22 | End: 2025-06-22

## 2025-06-22 RX ORDER — CEFDINIR 300 MG/1
300 CAPSULE ORAL 2 TIMES DAILY
Qty: 6 CAPSULE | Refills: 0 | Status: SHIPPED | OUTPATIENT
Start: 2025-06-22 | End: 2025-06-25

## 2025-06-22 RX ORDER — DOXYCYCLINE HYCLATE 100 MG
100 TABLET ORAL 2 TIMES DAILY
Qty: 6 TABLET | Refills: 0 | Status: SHIPPED | OUTPATIENT
Start: 2025-06-22 | End: 2025-06-25

## 2025-06-22 RX ORDER — PREDNISONE 20 MG/1
40 TABLET ORAL DAILY
Qty: 10 TABLET | Refills: 0 | Status: SHIPPED | OUTPATIENT
Start: 2025-06-23 | End: 2025-06-28

## 2025-06-22 RX ADMIN — CARVEDILOL 6.25 MG: 6.25 TABLET, FILM COATED ORAL at 16:41

## 2025-06-22 RX ADMIN — APIXABAN 5 MG: 5 TABLET, FILM COATED ORAL at 07:51

## 2025-06-22 RX ADMIN — FUROSEMIDE 20 MG: 10 INJECTION, SOLUTION INTRAMUSCULAR; INTRAVENOUS at 10:45

## 2025-06-22 RX ADMIN — SODIUM CHLORIDE, PRESERVATIVE FREE 10 ML: 5 INJECTION INTRAVENOUS at 07:52

## 2025-06-22 RX ADMIN — PANTOPRAZOLE SODIUM 40 MG: 40 TABLET, DELAYED RELEASE ORAL at 07:04

## 2025-06-22 RX ADMIN — CARVEDILOL 6.25 MG: 6.25 TABLET, FILM COATED ORAL at 07:51

## 2025-06-22 RX ADMIN — DOXYCYCLINE 100 MG: 100 INJECTION, POWDER, LYOPHILIZED, FOR SOLUTION INTRAVENOUS at 14:54

## 2025-06-22 RX ADMIN — APIXABAN 5 MG: 5 TABLET, FILM COATED ORAL at 21:36

## 2025-06-22 RX ADMIN — SODIUM CHLORIDE, PRESERVATIVE FREE 10 ML: 5 INJECTION INTRAVENOUS at 21:36

## 2025-06-22 RX ADMIN — ROSUVASTATIN CALCIUM 5 MG: 5 TABLET, FILM COATED ORAL at 07:51

## 2025-06-22 RX ADMIN — METHYLPREDNISOLONE SODIUM SUCCINATE 40 MG: 40 INJECTION INTRAMUSCULAR; INTRAVENOUS at 07:51

## 2025-06-22 RX ADMIN — WATER 2000 MG: 1 INJECTION INTRAMUSCULAR; INTRAVENOUS; SUBCUTANEOUS at 03:12

## 2025-06-22 RX ADMIN — DOXYCYCLINE 100 MG: 100 INJECTION, POWDER, LYOPHILIZED, FOR SOLUTION INTRAVENOUS at 03:35

## 2025-06-23 VITALS
HEART RATE: 77 BPM | TEMPERATURE: 97.5 F | WEIGHT: 211.86 LBS | BODY MASS INDEX: 40 KG/M2 | OXYGEN SATURATION: 99 % | HEIGHT: 61 IN | RESPIRATION RATE: 16 BRPM | DIASTOLIC BLOOD PRESSURE: 63 MMHG | SYSTOLIC BLOOD PRESSURE: 155 MMHG

## 2025-06-23 LAB
ANION GAP SERPL CALCULATED.3IONS-SCNC: 8 MMOL/L (ref 7–16)
BASOPHILS # BLD: 0 K/UL (ref 0–0.2)
BASOPHILS NFR BLD: 0 % (ref 0–2)
BUN SERPL-MCNC: 31 MG/DL (ref 6–23)
CALCIUM SERPL-MCNC: 9.2 MG/DL (ref 8.6–10.2)
CHLORIDE SERPL-SCNC: 100 MMOL/L (ref 98–107)
CO2 SERPL-SCNC: 29 MMOL/L (ref 22–29)
CREAT SERPL-MCNC: 0.7 MG/DL (ref 0.5–1)
EOSINOPHIL # BLD: 0 K/UL (ref 0.05–0.5)
EOSINOPHILS RELATIVE PERCENT: 0 % (ref 0–6)
ERYTHROCYTE [DISTWIDTH] IN BLOOD BY AUTOMATED COUNT: 12.6 % (ref 11.5–15)
GFR, ESTIMATED: 86 ML/MIN/1.73M2
GLUCOSE SERPL-MCNC: 97 MG/DL (ref 74–99)
HCT VFR BLD AUTO: 41.9 % (ref 34–48)
HGB BLD-MCNC: 13.1 G/DL (ref 11.5–15.5)
LYMPHOCYTES NFR BLD: 1.65 K/UL (ref 1.5–4)
LYMPHOCYTES RELATIVE PERCENT: 9 % (ref 20–42)
MCH RBC QN AUTO: 30.9 PG (ref 26–35)
MCHC RBC AUTO-ENTMCNC: 31.3 G/DL (ref 32–34.5)
MCV RBC AUTO: 98.8 FL (ref 80–99.9)
MONOCYTES NFR BLD: 12 % (ref 2–12)
MONOCYTES NFR BLD: 2.31 K/UL (ref 0.1–0.95)
MYELOCYTES ABSOLUTE COUNT: 0.16 K/UL
MYELOCYTES: 1 %
NEUTROPHILS NFR BLD: 79 % (ref 43–80)
NEUTS SEG NFR BLD: 15.18 K/UL (ref 1.8–7.3)
PLATELET # BLD AUTO: 236 K/UL (ref 130–450)
PMV BLD AUTO: 11.1 FL (ref 7–12)
POTASSIUM SERPL-SCNC: 4.5 MMOL/L (ref 3.5–5)
RBC # BLD AUTO: 4.24 M/UL (ref 3.5–5.5)
RBC # BLD: ABNORMAL 10*6/UL
RBC # BLD: ABNORMAL 10*6/UL
SODIUM SERPL-SCNC: 137 MMOL/L (ref 132–146)
WBC OTHER # BLD: 19.3 K/UL (ref 4.5–11.5)

## 2025-06-23 PROCEDURE — 80048 BASIC METABOLIC PNL TOTAL CA: CPT

## 2025-06-23 PROCEDURE — 97165 OT EVAL LOW COMPLEX 30 MIN: CPT

## 2025-06-23 PROCEDURE — 2500000003 HC RX 250 WO HCPCS: Performed by: HOSPITALIST

## 2025-06-23 PROCEDURE — 6370000000 HC RX 637 (ALT 250 FOR IP): Performed by: HOSPITALIST

## 2025-06-23 PROCEDURE — 6360000002 HC RX W HCPCS: Performed by: HOSPITALIST

## 2025-06-23 PROCEDURE — 85025 COMPLETE CBC W/AUTO DIFF WBC: CPT

## 2025-06-23 PROCEDURE — 6370000000 HC RX 637 (ALT 250 FOR IP): Performed by: STUDENT IN AN ORGANIZED HEALTH CARE EDUCATION/TRAINING PROGRAM

## 2025-06-23 PROCEDURE — 2700000000 HC OXYGEN THERAPY PER DAY

## 2025-06-23 PROCEDURE — 2580000003 HC RX 258: Performed by: HOSPITALIST

## 2025-06-23 PROCEDURE — 99239 HOSP IP/OBS DSCHRG MGMT >30: CPT | Performed by: STUDENT IN AN ORGANIZED HEALTH CARE EDUCATION/TRAINING PROGRAM

## 2025-06-23 PROCEDURE — 94660 CPAP INITIATION&MGMT: CPT

## 2025-06-23 PROCEDURE — 97535 SELF CARE MNGMENT TRAINING: CPT

## 2025-06-23 RX ADMIN — CARVEDILOL 6.25 MG: 6.25 TABLET, FILM COATED ORAL at 15:48

## 2025-06-23 RX ADMIN — SODIUM CHLORIDE, PRESERVATIVE FREE 10 ML: 5 INJECTION INTRAVENOUS at 08:52

## 2025-06-23 RX ADMIN — DOXYCYCLINE 100 MG: 100 INJECTION, POWDER, LYOPHILIZED, FOR SOLUTION INTRAVENOUS at 03:32

## 2025-06-23 RX ADMIN — CARVEDILOL 6.25 MG: 6.25 TABLET, FILM COATED ORAL at 08:53

## 2025-06-23 RX ADMIN — PREDNISONE 40 MG: 20 TABLET ORAL at 08:53

## 2025-06-23 RX ADMIN — DOXYCYCLINE 100 MG: 100 INJECTION, POWDER, LYOPHILIZED, FOR SOLUTION INTRAVENOUS at 15:50

## 2025-06-23 RX ADMIN — ROSUVASTATIN CALCIUM 5 MG: 5 TABLET, FILM COATED ORAL at 08:53

## 2025-06-23 RX ADMIN — WATER 2000 MG: 1 INJECTION INTRAMUSCULAR; INTRAVENOUS; SUBCUTANEOUS at 03:28

## 2025-06-23 RX ADMIN — PANTOPRAZOLE SODIUM 40 MG: 40 TABLET, DELAYED RELEASE ORAL at 05:13

## 2025-06-23 RX ADMIN — APIXABAN 5 MG: 5 TABLET, FILM COATED ORAL at 08:53

## 2025-06-23 ASSESSMENT — PAIN SCALES - GENERAL: PAINLEVEL_OUTOF10: 0

## 2025-06-23 NOTE — CARE COORDINATION
CARE MANAGEMENT....Patient discharged and will require home o2 2lnc. Order obtained. List of choices provided. Referral sent to [facility] via [facility]. Await input. No other home going needs noted. Will have transport home.     UPDATE, 12:35 pm... [facility] out of network. Referrals sent to [facility] and [facility] via [facility]. Await input.     UPDATE, 12:50 pm.... [employer] accepted and will deliver portable o2 to patients room.

## 2025-06-23 NOTE — PLAN OF CARE
Problem: Discharge Planning  Goal: Discharge to home or other facility with appropriate resources  6/23/2025 1047 by Daisy Faith, RN  Outcome: Progressing     Problem: Skin/Tissue Integrity  Goal: Skin integrity remains intact  Description: 1.  Monitor for areas of redness and/or skin breakdown  2.  Assess vascular access sites hourly  3.  Every 4-6 hours minimum:  Change oxygen saturation probe site  4.  Every 4-6 hours:  If on nasal continuous positive airway pressure, respiratory therapy assess nares and determine need for appliance change or resting period  6/23/2025 1047 by Daisy Faith, RN  Outcome: Progressing     Problem: Safety - Adult  Goal: Free from fall injury  6/23/2025 1047 by Daisy Faith, RN  Outcome: Progressing

## 2025-06-23 NOTE — DISCHARGE SUMMARY
[facility] Hospitalist Physician Discharge Summary     Navdeep Rankin DO  [address]  [address]  [facility] [address]  [phone]    Call today  for hospital follow up    Navdeep Rankin DO  [address]  [address]  [facility] [address]  [phone]          Activity level: as tolerated  Dispo: home    Condition on discharge: stable    Patient ID:  Jayne Weaver, 85 y.o., 1940  [id number]    Admit date: 6/20/2025  Discharge date and time:  6/23/2025  2:14 PM    Admission Diagnoses: Principal Problem:    Pneumonia due to organism  Active Problems:    Primary hypertension    Mixed hyperlipidemia    Chronic obstructive pulmonary disease, unspecified COPD type (HCC)    Persistent atrial fibrillation (HCC)    Chronic anticoagulation    S/P heart valve repair    Sepsis (HCC)  Resolved Problems:    * No resolved hospital problems. *    Discharge Diagnoses: Principal Problem:    Pneumonia due to organism  Active Problems:    Primary hypertension    Mixed hyperlipidemia    Chronic obstructive pulmonary disease, unspecified COPD type (HCC)    Persistent atrial fibrillation (HCC)    Chronic anticoagulation    S/P heart valve repair    Sepsis (HCC)  Resolved Problems:    * No resolved hospital problems. *    Consults:  IP CONSULT TO CASE MANAGEMENT    Procedures:   None      Hospital Course:   Patient Jayne Weaver is a 85 y.o. presented with Pneumonia due to organism [J18.9]  Acute respiratory failure with hypoxia (HCC) [J96.01]  Pneumonia due to infectious organism, unspecified laterality, unspecified part of lung [J18.9]    Brief summary:  Patient presented with sepsis and acute hypoxic resp failure 2/2 PNA and subsequent COPD exac. Treated with CAP coverage and sxs did improve, remained afebrile and with resolving leukocytosis. She will complete course of omnicef+azithro for CAP, and prednisone taper for COPD exac, as outpt. Patient was able to wean to RA while at rest, however still demonstrating need

## 2025-06-23 NOTE — PROGRESS NOTES
[facility] Hospitalist Progress Note    Admitting Date and Time: 6/20/2025 12:30 AM  Admit Dx: Pneumonia due to organism [J18.9]  Acute respiratory failure with hypoxia (HCC) [J96.01]  Pneumonia due to infectious organism, unspecified laterality, unspecified part of lung [J18.9]    Subjective:  Patient is being followed for Pneumonia due to organism [J18.9]  Acute respiratory failure with hypoxia (HCC) [J96.01]  Pneumonia due to infectious organism, unspecified laterality, unspecified part of lung [J18.9]   Pt feels better today  Per RN: no additional concerns    ROS: denies fever, chills, cp, sob, n/v, HA unless otherwise noted above    Meds:   methylPREDNISolone  40 mg IntraVENous Q8H    cefTRIAXone (ROCEPHIN) IV  2,000 mg IntraVENous Q24H    doxycycline (VIBRAMYCIN) IV  100 mg IntraVENous Q12H    apixaban  5 mg Oral BID    carvedilol  6.25 mg Oral BID WC    pantoprazole  40 mg Oral QAM AC    rosuvastatin  5 mg Oral Daily    [Held by provider] triamterene-hydroCHLOROthiazide  1 tablet Oral Daily    sodium chloride flush  5-40 mL IntraVENous 2 times per day     ipratropium 0.5 mg-albuterol 2.5 mg, 1 Dose, Q4H PRN  iopamidol, 75 mL, ONCE PRN  sodium chloride flush, 5-40 mL, PRN  sodium chloride, , PRN  ondansetron, 4 mg, Q8H PRN   Or  ondansetron, 4 mg, Q6H PRN  melatonin, 3 mg, Nightly PRN  polyethylene glycol, 17 g, Daily PRN  senna, 1 tablet, Daily PRN  acetaminophen, 650 mg, Q6H PRN   Or  acetaminophen, 650 mg, Q6H PRN  guaiFENesin-dextromethorphan, 5 mL, Q4H PRN       Objective:  BP (!) 147/60   Pulse 75   Temp 97.5 °F (36.4 °C) (Oral)   Resp 16   Ht 1.549 m (5' 1\")   Wt 94.6 kg (208 lb 9.6 oz)   SpO2 97%   BMI 39.41 kg/m²   General Appearance: alert and oriented to person, place, time. NAD, sitting on side of bed  Skin: warm and dry  Head: normocephalic and atraumatic  Eyes: PERRL, EOMI, conjunctivae normal  Neck: neck supple, trachea midline   Pulmonary/Chest: no respiratory distress, 
       [facility] Hospitalist Progress Note    Admitting Date and Time: 6/20/2025 12:30 AM  Admit Dx: Pneumonia due to organism [J18.9]  Acute respiratory failure with hypoxia (HCC) [J96.01]  Pneumonia due to infectious organism, unspecified laterality, unspecified part of lung [J18.9]    Subjective:  Patient is being followed for Pneumonia due to organism [J18.9]  Acute respiratory failure with hypoxia (HCC) [J96.01]  Pneumonia due to infectious organism, unspecified laterality, unspecified part of lung [J18.9]   Pt feels better today  Per RN: no additional concerns    ROS: denies fever, chills, cp, sob, n/v, HA unless otherwise noted above    Meds:   [START ON 6/23/2025] predniSONE  40 mg Oral Daily    cefTRIAXone (ROCEPHIN) IV  2,000 mg IntraVENous Q24H    doxycycline (VIBRAMYCIN) IV  100 mg IntraVENous Q12H    apixaban  5 mg Oral BID    carvedilol  6.25 mg Oral BID WC    pantoprazole  40 mg Oral QAM AC    rosuvastatin  5 mg Oral Daily    [Held by provider] triamterene-hydroCHLOROthiazide  1 tablet Oral Daily    sodium chloride flush  5-40 mL IntraVENous 2 times per day     ipratropium 0.5 mg-albuterol 2.5 mg, 1 Dose, Q4H PRN  iopamidol, 75 mL, ONCE PRN  sodium chloride flush, 5-40 mL, PRN  sodium chloride, , PRN  ondansetron, 4 mg, Q8H PRN   Or  ondansetron, 4 mg, Q6H PRN  melatonin, 3 mg, Nightly PRN  polyethylene glycol, 17 g, Daily PRN  senna, 1 tablet, Daily PRN  acetaminophen, 650 mg, Q6H PRN   Or  acetaminophen, 650 mg, Q6H PRN  guaiFENesin-dextromethorphan, 5 mL, Q4H PRN       Objective:  BP (!) 150/64   Pulse 83   Temp 97.3 °F (36.3 °C) (Oral)   Resp 20   Ht 1.549 m (5' 1\")   Wt 96.9 kg (213 lb 9.6 oz)   SpO2 99%   BMI 40.36 kg/m²   General Appearance: alert and oriented to person, place, time. NAD, resting in bed  Skin: warm and dry  Head: normocephalic and atraumatic  Eyes: PERRL, EOMI, conjunctivae normal  Neck: neck supple, trachea midline   Pulmonary/Chest: no respiratory distress, 
4 Eyes Skin Assessment     NAME:  Jayne Weaver  YOB: 1940  MEDICAL RECORD NUMBER:  [id number]    The patient is being assessed for  Admission    I agree that at least one RN has performed a thorough Head to Toe Skin Assessment on the patient. ALL assessment sites listed below have been assessed.      Areas assessed by both nurses:    Head, Face, Ears, Shoulders, Back, Chest, Arms, Elbows, Hands, Sacrum. Buttock, Coccyx, Ischium, Legs. Feet and Heels, and Under Medical Devices         Does the Patient have a Wound? No noted wound(s)       Franco Prevention initiated by RN: Yes  Wound Care Orders initiated by RN: No    Pressure Injury (Stage 3,4, Unstageable, DTI, NWPT, and Complex wounds) if present, place Wound referral order by RN under : No    New Ostomies, if present place, Ostomy referral order under : No     Nurse 1 eSignature: Electronically signed by Caro Patel RN on 6/20/25 at 6:11 AM EDT    **SHARE this note so that the co-signing nurse can place an eSignature**    Nurse 2 eSignature: Electronically signed by Nahomy Flores RN on 6/20/25 at 6:55 AM EDT    
Patient requesting that bed alarm not remain on because she is fine to ambulate, this RN assessed patient ambulation and she did well.  patient was educated on the importance of safety and to call if help is needed, she verbalized understanding. Bed alarm remains off.   
Physical Therapy  Facility/Department: [facility]    Name: Jayne Weaver  : 1940  MRN: [id number]  Date of Service: 2025      Order received for PT evaluation, pt up independently in room. Denies any PT needs at this time.   Shaista Arvizu PT [id number]  
Pulse Ox room air @ rest: 92%    Pulse Ox room air with ambulation: 77%    Pulse Ox ambulating with O2 on 2 liters Via nasal cannula is 92 %.    After ambulation @ rest for recovery on 2 liters of O2 via nasal cannula 96%.     
from continued skilled OT to increase safety and independence with completion of ADL/IADL tasks for functional independence and quality of life.     Treatment: OT treatment provided this date included:   Instruction/training on safety and adapted techniques for completion of ADLs.    Instruction/training on safe functional mobility/transfer techniques.    Instruction/training on energy conservation/work simplification for completion of ADLs/IADLs - including potential benefits of DME use and other compensatory strategies to maximize independence/safety in home environment.    Patient education provided regardin) importance of having assist from family/friends/neighbors, as needed, to ensure safety with completion of ADLs/IADLs, 2) techniques to maximize safety with management of O2 line. Patient indicated understanding.    Further skilled OT treatment indicated to increase patient's safety and independence with completion of ADL/IADL tasks in order to maximize patient's functional independence and quality of life.    Rehab Potential: Good for established goals.  Patient / Family Goal: Patient anticipates returning home upon discharge.  Patient and/or family were instructed on functional diagnosis, prognosis/goals, and OT plan of care. Demonstrated Good understanding.    Eval Complexity: Low    Time In: 1517  Time Out: 1540  Total Treatment Time: 8 minutes      Minutes Units   OT Eval Low 14601 15 1   OT Eval Medium 73630     OT Eval High 44290     OT Re-Eval 61908     Therapeutic Ex 30688     Therapeutic Activities 71025     ADL/Self Care 82307 8 1   Orthotic Management 05652     Neuro Re-Ed 51507     Non-Billable Time N/A ---     Evaluation time includes thorough review of current medical information, gathering information on past medical history/social history and prior level of function, completion of standardized testing/informal observation of tasks, assessment of data, and education on plan of care and

## 2025-06-24 ENCOUNTER — TELEPHONE (OUTPATIENT)
Dept: PRIMARY CARE CLINIC | Age: 85
End: 2025-06-24

## 2025-06-24 ENCOUNTER — CARE COORDINATION (OUTPATIENT)
Dept: CARE COORDINATION | Age: 85
End: 2025-06-24

## 2025-06-24 DIAGNOSIS — J18.9 PNEUMONIA OF BOTH LUNGS DUE TO INFECTIOUS ORGANISM, UNSPECIFIED PART OF LUNG: Primary | Chronic | ICD-10-CM

## 2025-06-24 PROCEDURE — 1111F DSCHRG MED/CURRENT MED MERGE: CPT | Performed by: FAMILY MEDICINE

## 2025-06-24 NOTE — CARE COORDINATION
Care Transitions Note    Initial Call - Call within 2 business days of discharge: Yes    Patient Current Location:  Home: [address]  [place] [address]    Care Transition Nurse contacted the patient by telephone to perform post hospital discharge assessment, verified name and  as identifiers.  Provided introduction to self, and explanation of the Care Transition Nurse role.    Patient: Jayne Weaver    Patient : 1940   MRN: [id number]    Reason for Admission: PNA  Discharge Date: 25  RURS: Readmission Risk Score: 13.1      Last Discharge Facility       Date Complaint Diagnosis Description Type Department Provider    25 Shortness of Breath Acute respiratory failure with hypoxia (HCC) ... ED to Hosp-Admission (Discharged) (ADMITTED) Haile Wren MD; Rene Cornoa...            Was this an external facility discharge? No    Additional needs identified to be addressed with provider   No needs identified             Method of communication with provider: none.    Patients top risk factors for readmission: lack of knowledge about disease, medical condition-PNA, COPD, Sepsis, AFib on ACT, and polypharmacy    Interventions to address risk factors:   Education: Discussed COPD/PNA zone tools and knowing when to seek medical attention.  Review of patient management of conditions/medications: Advised to take Prednisone/ATB's as directed until finished. Discussed bleeding precautions associated with blood thinner therapy and knowing when to call doctor or seek immediate medical attention.     Care Summary Note: Spoke with patient today 25 for initial JAVI/hospital discharge (Low risk) follow up. Patient states she is feeling better since discharge. States breathing has improved with home oxygen but still has shortness of breath when up moving around. Denies any recent use of Albuterol rescue inhaler since d/c but takes her Breztri for maintenance. Patient aware to rinse mouth after  No indicators present

## 2025-06-24 NOTE — TELEPHONE ENCOUNTER
Care Transitions Initial Follow Up Call    Outreach made within 2 business days of discharge: Yes    Patient: Jayne Weaver Patient : 1940   MRN: [id number]  Reason for Admission: Pneumonia due to organism  Discharge Date: 25       Spoke with: Patient    Discharge department/facility: [facility] Patient Contact:  Was patient able to fill all prescriptions: Yes  Was patient instructed to bring all medications to the follow-up visit: Yes  Is patient taking all medications as directed in the discharge summary? Yes  Does patient understand their discharge instructions: Yes  Does patient have questions or concerns that need addressed prior to 7-14 day follow up office visit: no    Additional needs identified to be addressed with provider  No needs identified             Scheduled appointment with PCP within 7-14 days    Follow Up  Future Appointments   Date Time Provider Department Center   2025 10:00 AM Navdeep Rankin DO [facility] [place] DEP   2025  1:00 PM Chema Norman MD [facility]   2025  1:00 PM Navdeep Rankin DO [facility] [place] [facility]   2025  2:40 PM Rojas Garcia APRN - [facility]   2026  9:00 AM MARSHALL YNG [facility]   2026 10:00 AM Sara Qureshi PA [facility]       Cindy Schwab MA

## 2025-06-25 LAB
MICROORGANISM SPEC CULT: NORMAL
MICROORGANISM SPEC CULT: NORMAL
SERVICE CMNT-IMP: NORMAL
SERVICE CMNT-IMP: NORMAL
SPECIMEN DESCRIPTION: NORMAL
SPECIMEN DESCRIPTION: NORMAL

## 2025-06-27 ENCOUNTER — OFFICE VISIT (OUTPATIENT)
Dept: PRIMARY CARE CLINIC | Age: 85
End: 2025-06-27

## 2025-06-27 VITALS
HEIGHT: 61 IN | SYSTOLIC BLOOD PRESSURE: 118 MMHG | HEART RATE: 63 BPM | BODY MASS INDEX: 38.33 KG/M2 | TEMPERATURE: 97.2 F | RESPIRATION RATE: 16 BRPM | DIASTOLIC BLOOD PRESSURE: 62 MMHG | WEIGHT: 203 LBS | OXYGEN SATURATION: 98 %

## 2025-06-27 DIAGNOSIS — Z09 HOSPITAL DISCHARGE FOLLOW-UP: Primary | ICD-10-CM

## 2025-06-27 DIAGNOSIS — J44.9 CHRONIC OBSTRUCTIVE PULMONARY DISEASE, UNSPECIFIED COPD TYPE (HCC): ICD-10-CM

## 2025-06-27 DIAGNOSIS — I48.19 PERSISTENT ATRIAL FIBRILLATION (HCC): ICD-10-CM

## 2025-06-27 DIAGNOSIS — B37.0 THRUSH: ICD-10-CM

## 2025-06-27 DIAGNOSIS — E78.2 MIXED HYPERLIPIDEMIA: ICD-10-CM

## 2025-06-27 DIAGNOSIS — J18.9 PNEUMONIA OF BOTH LUNGS DUE TO INFECTIOUS ORGANISM, UNSPECIFIED PART OF LUNG: Chronic | ICD-10-CM

## 2025-06-27 DIAGNOSIS — R53.83 FATIGUE, UNSPECIFIED TYPE: ICD-10-CM

## 2025-06-27 DIAGNOSIS — I38 VALVULAR HEART DISEASE: ICD-10-CM

## 2025-06-27 DIAGNOSIS — I10 PRIMARY HYPERTENSION: ICD-10-CM

## 2025-06-27 PROBLEM — A41.9 SEPSIS (HCC): Chronic | Status: RESOLVED | Noted: 2025-06-20 | Resolved: 2025-06-27

## 2025-06-27 RX ORDER — NYSTATIN 100000 [USP'U]/ML
500000 SUSPENSION ORAL 4 TIMES DAILY
Qty: 200 ML | Refills: 0 | Status: SHIPPED | OUTPATIENT
Start: 2025-06-27 | End: 2025-07-07

## 2025-06-27 NOTE — ASSESSMENT & PLAN NOTE
Acute condition, new, Supportive care with appropriate antipyretics and fluids.  Continue Antibiotics. Oxygen until cleared by Pulmonology

## 2025-06-27 NOTE — PROGRESS NOTES
[address] [facility] [address]      Phone: [phone]   nystatin 741033 UNIT/ML suspension           Medications marked \"taking\" at this time  Outpatient Medications Marked as Taking for the 6/27/25 encounter (Office Visit) with Navdeep Rankin DO   Medication Sig Dispense Refill    nystatin (MYCOSTATIN) 138116 UNIT/ML suspension Take 5 mLs by mouth 4 times daily for 10 days Retain in mouth as long as possible 200 mL 0    predniSONE (DELTASONE) 20 MG tablet Take 2 tablets by mouth daily for 5 doses 10 tablet 0    triamterene-hydroCHLOROthiazide (DYAZIDE) 37.5-25 MG per capsule Take 1 capsule by mouth daily 30 capsule 5    cetirizine (ZYRTEC) 10 MG tablet Take 1 tablet by mouth daily      apixaban (ELIQUIS) 5 MG TABS tablet Take 1 tablet by mouth 2 times daily 180 tablet 1    carvedilol (COREG) 3.125 MG tablet Take 2 tablets by mouth 2 times daily (with meals) 360 tablet 1    albuterol sulfate HFA (VENTOLIN HFA) 108 (90 Base) MCG/ACT inhaler Inhale 2 puffs into the lungs every 6 hours as needed for Wheezing 18 g 5    Budeson-Glycopyrrol-Formoterol (BREZTRI AEROSPHERE) 160-9-4.8 MCG/ACT AERO Inhale 2 puffs into the lungs in the morning and at bedtime 10.7 g 5    pantoprazole (PROTONIX) 40 MG tablet Take 1 tablet by mouth daily 90 tablet 1    rosuvastatin (CRESTOR) 5 MG tablet Take 1 tablet by mouth daily 90 tablet 1    acetaminophen (TYLENOL) 500 MG tablet Take 2 tablets by mouth every 6 hours as needed for Pain      Multiple Vitamins-Minerals (CENTRUM PO) Take by mouth daily          Medications patient taking as of now reconciled against medications ordered at time of hospital discharge: Yes    A comprehensive review of systems was negative except for what was noted in the HPI.    Objective:       Vitals:    06/27/25 0948   BP: 118/62   Pulse: 63   Resp: 16   Temp: 97.2 °F (36.2 °C)   SpO2: 98%   Weight: 92.1 kg (203 lb)   Height: 1.549 m (5' 1\")      Body mass index is 38.36 kg/m².       General Appearance:

## 2025-06-30 ENCOUNTER — CARE COORDINATION (OUTPATIENT)
Dept: CARE COORDINATION | Age: 85
End: 2025-06-30

## 2025-06-30 NOTE — CARE COORDINATION
Care Transitions Note    Final Call     Patient Current Location:  Home: [address]  [address]  [place] [address]    Care Transition Nurse contacted the patient by telephone. Verified name and  as identifiers.    Patient graduated from the Care Transitions program on 25.  Patient/family verbalizes confidence in the ability to self-manage at this time..      Advance Care Planning:   Does patient have an Advance Directive: reviewed and current.    Handoff:   Patient was not referred to the ACM team due to no additional needs identified.       Care Summary Note: Spoke with patient today 25 for final JAVI/hospital discharge (low risk) follow up for PNA. Patient states she is doing well and offers no complaints. Patient admits she was able to go to [facility] thi morning with her friend w/o home oxygen and did well. States put home oxygen 2 lpm back on once returning  home. States O2 sat while on phone with this CTN is 99% with oxygen. Confirmed with patient she is scheduled to follow up tomorrow at 10 am with Dr. Lee's NP (Rojas) pulmonology.    Denies any chest pain, chest discomfort, nausea, vomiting, chills or fever. Confirmed with patient she finished Prednisone and antibiotics that was ordered on hospital discharge and completed HFu appt with Dr. Rankin () on 25.     Patient denies any complaints, concerns or needs and is in agreement to final call. CTN signing off.    Assessments:  Care Transitions Subsequent and Final Call    Subsequent and Final Calls  Do you have any ongoing symptoms?: No  Have your medications changed?: No  Do you have any questions related to your medications?: No  Do you currently have any active services?: No  Do you have any needs or concerns that I can assist you with?: No  Identified Barriers: Lack of Education  Care Transitions Interventions  No Identified Needs  Other Interventions:              Upcoming Appointments:    Future Appointments         Provider Specialty

## 2025-07-31 RX ORDER — ROSUVASTATIN CALCIUM 5 MG/1
5 TABLET, COATED ORAL DAILY
Qty: 90 TABLET | Refills: 1 | Status: SHIPPED | OUTPATIENT
Start: 2025-07-31

## 2025-08-07 ENCOUNTER — OFFICE VISIT (OUTPATIENT)
Dept: CARDIOLOGY CLINIC | Age: 85
End: 2025-08-07
Payer: MEDICARE

## 2025-08-07 VITALS
OXYGEN SATURATION: 93 % | DIASTOLIC BLOOD PRESSURE: 76 MMHG | HEART RATE: 70 BPM | BODY MASS INDEX: 38.23 KG/M2 | RESPIRATION RATE: 18 BRPM | TEMPERATURE: 97.7 F | HEIGHT: 61 IN | SYSTOLIC BLOOD PRESSURE: 124 MMHG | WEIGHT: 202.5 LBS

## 2025-08-07 DIAGNOSIS — I48.19 PERSISTENT ATRIAL FIBRILLATION (HCC): ICD-10-CM

## 2025-08-07 DIAGNOSIS — Z95.2 HISTORY OF TRANSCATHETER AORTIC VALVE REPLACEMENT (TAVR): ICD-10-CM

## 2025-08-07 DIAGNOSIS — I10 PRIMARY HYPERTENSION: ICD-10-CM

## 2025-08-07 DIAGNOSIS — Z79.01 CHRONIC ANTICOAGULATION: ICD-10-CM

## 2025-08-07 DIAGNOSIS — I50.31 ACUTE HEART FAILURE WITH PRESERVED EJECTION FRACTION (HCC): Primary | ICD-10-CM

## 2025-08-07 PROCEDURE — 3074F SYST BP LT 130 MM HG: CPT | Performed by: INTERNAL MEDICINE

## 2025-08-07 PROCEDURE — 99214 OFFICE O/P EST MOD 30 MIN: CPT | Performed by: INTERNAL MEDICINE

## 2025-08-07 PROCEDURE — 93000 ELECTROCARDIOGRAM COMPLETE: CPT | Performed by: INTERNAL MEDICINE

## 2025-08-07 PROCEDURE — 1159F MED LIST DOCD IN RCRD: CPT | Performed by: INTERNAL MEDICINE

## 2025-08-07 PROCEDURE — 3078F DIAST BP <80 MM HG: CPT | Performed by: INTERNAL MEDICINE

## 2025-08-07 PROCEDURE — 1160F RVW MEDS BY RX/DR IN RCRD: CPT | Performed by: INTERNAL MEDICINE

## 2025-08-07 PROCEDURE — 1123F ACP DISCUSS/DSCN MKR DOCD: CPT | Performed by: INTERNAL MEDICINE

## 2025-08-18 RX ORDER — PANTOPRAZOLE SODIUM 40 MG/1
40 TABLET, DELAYED RELEASE ORAL DAILY
Qty: 90 TABLET | Refills: 1 | Status: SHIPPED | OUTPATIENT
Start: 2025-08-18

## (undated) DEVICE — APPLICATOR MEDICATED 26 CC SOLUTION HI LT ORNG CHLORAPREP

## (undated) DEVICE — RADIFOCUS GLIDEWIRE: Brand: GLIDEWIRE

## (undated) DEVICE — AMPLATZ EXTRA STIFF WIRE GUIDE: Brand: AMPLATZ

## (undated) DEVICE — STRAP POS MP 30X3 IN HK LOOP CLOSURE FOAM DISP

## (undated) DEVICE — ANGIOGRAPHIC CATHETER: Brand: EXPO™

## (undated) DEVICE — DELIV SYS D-EVOLUTFX-2329: Brand: EVOLUT™ FX

## (undated) DEVICE — GAUZE,SPONGE,4"X4",8PLY,STRL,LF,10/TRAY: Brand: MEDLINE

## (undated) DEVICE — Device

## (undated) DEVICE — PERCUTANEOUS ENTRY THINWALL NEEDLE  ONE-PART: Brand: COOK

## (undated) DEVICE — GOWN,SIRUS,FABRNF,L,20/CS: Brand: MEDLINE

## (undated) DEVICE — SET TRNQT W/ STD 7IN 12FR 5 TB 1 SNR DLP

## (undated) DEVICE — COVER PRB W14XL147CM TELESCOPICALLY FLD EXT LEN CIV-FLEX

## (undated) DEVICE — SYRINGE MED 20ML STD CLR PLAS LUERLOCK TIP N CTRL DISP

## (undated) DEVICE — KIT MED IMAG CNTRST AGNT W/ IOPAMIDOL REUSE

## (undated) DEVICE — LARGE BORE STOPCOCK WITH ROTATING MALE LUER LOCK

## (undated) DEVICE — NEEDLE SPNL 20GA L3.5IN YEL HUB S STL REG WALL FIT STYL

## (undated) DEVICE — 3M™ IOBAN™ 2 ANTIMICROBIAL INCISE DRAPE 6650EZ: Brand: IOBAN™ 2

## (undated) DEVICE — SYRINGE MED 50ML LUERLOCK TIP

## (undated) DEVICE — DOUBLE BASIN SET: Brand: MEDLINE INDUSTRIES, INC.

## (undated) DEVICE — BLADE,STAINLESS-STEEL,15,STRL,DISPOSABLE: Brand: MEDLINE

## (undated) DEVICE — BOWL MED L 32OZ PLAS W/ MOLD GRAD EZ OPN PEEL PCH

## (undated) DEVICE — CATHETER DIAG 5FR L110CM ID0.045IN VENT VASC PGTL 145 EXPO

## (undated) DEVICE — Z DISCONTINUED SHEATH INTRO 6FR L11CM 0.038IN SIL TRICSP VLV W/ GWIRE

## (undated) DEVICE — CATHETER DIAG AD 5FR L100CM COR GRY HYDRPHLC NYL MPA 2 W/ 2

## (undated) DEVICE — BASIC: Brand: MEDLINE INDUSTRIES, INC.

## (undated) DEVICE — INTRODUCER SHTH 6FR L12CM DIA0.038IN HEMSTAS CLOSE TOL

## (undated) DEVICE — GLOVE SURG 7 PF POLYMER COAT WHT STRL SIGN LTX ESSENTIAL LTX

## (undated) DEVICE — SYRINGE MED 10ML LUERLOCK TIP W/O SFTY DISP

## (undated) DEVICE — GLIDESHEATH SLENDER ACCESS KIT: Brand: GLIDESHEATH SLENDER

## (undated) DEVICE — COVER,LIGHT HANDLE,FLX,2/PK: Brand: MEDLINE INDUSTRIES, INC.

## (undated) DEVICE — LOADING SYS L-EVOLUTFX-2329: Brand: EVOLUT™ FX

## (undated) DEVICE — GLOVE SURG SZ 65 THK91MIL LTX FREE SYN POLYISOPRENE

## (undated) DEVICE — PERCLOSE™ PROSTYLE™ SUTURE-MEDIATED CLOSURE AND REPAIR SYSTEM: Brand: PERCLOSE™ PROSTYLE™

## (undated) DEVICE — PAD, DEFIB, ADULT, RADIOTRAN, PHYSIO, LO: Brand: MEDLINE

## (undated) DEVICE — TOWEL,OR,DSP,ST,BLUE,STD,6/PK,12PK/CS: Brand: MEDLINE

## (undated) DEVICE — GUIDEWIRE VASCULAR L145CM 0.035IN J TIP L3MM PTFE FIX COR NAMIC

## (undated) DEVICE — TRUE™ DILATATION BALLOON VALVULOPLASTY CATHETER, 20 MM X 4.5 CM, 110 CM CATHETER: Brand: TRUE DILATATION

## (undated) DEVICE — ELECTRODE PT RET AD L9FT HI MOIST COND ADH HYDRGEL CORDED

## (undated) DEVICE — DRAPE,REIN 53X77,STERILE: Brand: MEDLINE

## (undated) DEVICE — GUIDEWIRE VASC L260CM DIA0.035IN BRECKER FOR COREVLV

## (undated) DEVICE — FORCEPS BX OVL CUP FEN DISPOSABLE CAP L 160CM RAD JAW 4

## (undated) DEVICE — BLADE,STAINLESS-STEEL,10,STRL,DISPOSABLE: Brand: MEDLINE

## (undated) DEVICE — COVER,TABLE,60X90,STERILE: Brand: MEDLINE

## (undated) DEVICE — GUIDEWIRE VASC L260CM DIA0.035IN TAPR L11CM FLPY TIP L4CM

## (undated) DEVICE — KIT MFLD ISOLATN NACL CNTRST PRT TBNG SPIK W/ PRSS TRNSDUC

## (undated) DEVICE — KIT ANGIO W/ AT P65 PREM HND CTRL FOR CNTRST DEL ANGIOTOUCH

## (undated) DEVICE — GLOVE ORANGE PI 7   MSG9070

## (undated) DEVICE — CATHETER ANGIO 5FR L100CM ID0.045IN AL1 CRV ROBUST SHFT

## (undated) DEVICE — DRESSING TRNSPAR W4XL55IN ACRYL SUP FLM W ADH WTRPRF OPSITE

## (undated) DEVICE — SWAN-GANZ TRUE SIZE THERMODULTION CATHETER, 5F: Brand: SWAN-GANZ TRUE SIZE

## (undated) DEVICE — GLOVE ORANGE PI 7 1/2   MSG9075

## (undated) DEVICE — MEDI-TRACE CADENCE ADULT, PRE-CONNECT  DEFIBRILLATION ELECTRODE (10 PR/PK) - PHYSIO-CONTROL: Brand: MEDI-TRACE CADENCE

## (undated) DEVICE — BLADE CLIPPER GEN PURP NS

## (undated) DEVICE — SET INTRO SHTH 5FR L45CM GRY INTEGR SIDE PRT HAEMOSTASIS

## (undated) DEVICE — BAND COMPR L24CM REG CLR PLAS HEMSTAT EXT HK AND LOOP RETEN

## (undated) DEVICE — BENTSON GUIDEWIRE ANGIO L150CM OD0.035IN STR FIX PTFE SLD SUPP

## (undated) DEVICE — CATHETER PACE 5FR L110CM 6FR INTRO D10CM 1MM SPACE POLYUR

## (undated) DEVICE — BLADE,STAINLESS-STEEL,20,STRL,DISPOSABLE: Brand: MEDLINE

## (undated) DEVICE — PINNACLE INTRODUCER SHEATH: Brand: PINNACLE

## (undated) DEVICE — WIPES SKIN CLOTH READYPREP 9 X 10.5 IN 2% CHLORHEX GLUCONATE CHG PREOP

## (undated) DEVICE — DRAPE SHEET: Brand: UNBRANDED

## (undated) DEVICE — ESOPHAGEAL BALLOON DILATATION CATHETER: Brand: CRE FIXED WIRE

## (undated) DEVICE — SYRINGE INFL 60ML DISP ALLIANCE II

## (undated) DEVICE — OPTIFOAM GENTLE EX, SACRUM, 9X9: Brand: MEDLINE

## (undated) DEVICE — GUIDEWIRE VASC L260CM 0.035IN J TIP L3MM PTFE FIX COR NAMIC

## (undated) DEVICE — NDLHOLDER F/P WEBSTER FN8064 S: Brand: CENTURION MEDICAL PRODUCTS CORP

## (undated) DEVICE — KIT HND CTRL 3 W STPCOCK ROT END 54IN PREM HI PRSS TBNG AT

## (undated) DEVICE — TAPE ADH W2INXL10YD PLAS TRNSPAR H2O RESIST HYPOALRG CURAD

## (undated) DEVICE — MEDIA CONTRAST ISOVUE 300 100ML

## (undated) DEVICE — PICO 7 10CM X 30CM: Brand: PICO™ 7

## (undated) DEVICE — DRAPE, MULTI FENESTRATED, HYBRID OR, STE: Brand: MEDLINE

## (undated) DEVICE — LOOP VES W25MM THK1MM MAXI RED SIL FLD REPELLENT 100 PER

## (undated) DEVICE — GLOVE SURG SZ 7.5 L11.73IN FNGR THK9.8MIL STRW LTX POLYMER

## (undated) DEVICE — SHEATH INTRO 14FR L33CM OD5.3MM ID4.7MM HYDRPHLC KINK

## (undated) DEVICE — GOWN,SIRUS,FABRNF,XL,20/CS: Brand: MEDLINE

## (undated) DEVICE — CANNULA NSL CANN NSL L25FT TBNG AD O2 SUP SFT UC

## (undated) DEVICE — LABEL MED CARD SURG 4 IN PANEL STRL

## (undated) DEVICE — TOWEL,OR,DSP,ST,WHITE,DLX,4/PK,20PK/CS: Brand: MEDLINE

## (undated) DEVICE — DRAPE EQUIP BANDED BG 36X28 IN W/ROUNDED CORNER SNAPKOVER

## (undated) DEVICE — ANGIOPLASTY ADD-ON PACK: Brand: MEDLINE INDUSTRIES, INC.

## (undated) DEVICE — CATHETER DIAG 5FR L100CM LUMN ID0.047IN JR4 CRV 0 SIDE H

## (undated) DEVICE — CATHETER DIAG 5FR L110CM PIG CRV SZ 6 SIDE H DBL BRAID WIRE

## (undated) DEVICE — ELECTRODE ES AD PED L2.5IN TEF INSUL MOD NONCORDED BLDE TIP

## (undated) DEVICE — SOLUTION IV 1000ML 140MEQ/L SOD 5MEQ/L K 3MEQ/L MG 27MEQ/L

## (undated) DEVICE — BLADE,STAINLESS-STEEL,11,STRL,DISPOSABLE: Brand: MEDLINE

## (undated) DEVICE — SOLUTION IV 1000ML 0.9% SOD CHL PH 5 INJ USP VIAFLX PLAS